# Patient Record
Sex: MALE | Race: ASIAN | NOT HISPANIC OR LATINO | URBAN - METROPOLITAN AREA
[De-identification: names, ages, dates, MRNs, and addresses within clinical notes are randomized per-mention and may not be internally consistent; named-entity substitution may affect disease eponyms.]

---

## 2017-12-15 ENCOUNTER — INPATIENT (INPATIENT)
Facility: HOSPITAL | Age: 57
LOS: 22 days | Discharge: HOME | DRG: 64 | End: 2018-01-07
Attending: SPECIALIST | Admitting: NEUROLOGICAL SURGERY
Payer: MEDICAID

## 2017-12-15 VITALS
SYSTOLIC BLOOD PRESSURE: 142 MMHG | DIASTOLIC BLOOD PRESSURE: 82 MMHG | HEART RATE: 72 BPM | RESPIRATION RATE: 16 BRPM | OXYGEN SATURATION: 98 %

## 2017-12-15 DIAGNOSIS — I10 ESSENTIAL (PRIMARY) HYPERTENSION: ICD-10-CM

## 2017-12-15 LAB
ALBUMIN SERPL ELPH-MCNC: 4.2 G/DL — SIGNIFICANT CHANGE UP (ref 3.3–5)
ALP SERPL-CCNC: 53 U/L — SIGNIFICANT CHANGE UP (ref 40–120)
ALT FLD-CCNC: 39 U/L RC — SIGNIFICANT CHANGE UP (ref 10–45)
ANION GAP SERPL CALC-SCNC: 13 MMOL/L — SIGNIFICANT CHANGE UP (ref 5–17)
APTT BLD: 28.1 SEC — SIGNIFICANT CHANGE UP (ref 27.5–37.4)
AST SERPL-CCNC: 41 U/L — HIGH (ref 10–40)
BASOPHILS # BLD AUTO: 0 K/UL — SIGNIFICANT CHANGE UP (ref 0–0.2)
BASOPHILS NFR BLD AUTO: 0.1 % — SIGNIFICANT CHANGE UP (ref 0–2)
BILIRUB SERPL-MCNC: 0.5 MG/DL — SIGNIFICANT CHANGE UP (ref 0.2–1.2)
BLD GP AB SCN SERPL QL: NEGATIVE — SIGNIFICANT CHANGE UP
BUN SERPL-MCNC: 18 MG/DL — SIGNIFICANT CHANGE UP (ref 7–23)
CALCIUM SERPL-MCNC: 8.9 MG/DL — SIGNIFICANT CHANGE UP (ref 8.4–10.5)
CHLORIDE SERPL-SCNC: 101 MMOL/L — SIGNIFICANT CHANGE UP (ref 96–108)
CO2 SERPL-SCNC: 27 MMOL/L — SIGNIFICANT CHANGE UP (ref 22–31)
CREAT SERPL-MCNC: 0.74 MG/DL — SIGNIFICANT CHANGE UP (ref 0.5–1.3)
EOSINOPHIL # BLD AUTO: 0 K/UL — SIGNIFICANT CHANGE UP (ref 0–0.5)
EOSINOPHIL NFR BLD AUTO: 0 % — SIGNIFICANT CHANGE UP (ref 0–6)
GLUCOSE SERPL-MCNC: 153 MG/DL — HIGH (ref 70–99)
HCT VFR BLD CALC: 47.2 % — SIGNIFICANT CHANGE UP (ref 39–50)
HGB BLD-MCNC: 15.8 G/DL — SIGNIFICANT CHANGE UP (ref 13–17)
INR BLD: 1.08 RATIO — SIGNIFICANT CHANGE UP (ref 0.88–1.16)
LYMPHOCYTES # BLD AUTO: 1.3 K/UL — SIGNIFICANT CHANGE UP (ref 1–3.3)
LYMPHOCYTES # BLD AUTO: 7.9 % — LOW (ref 13–44)
MCHC RBC-ENTMCNC: 31.1 PG — SIGNIFICANT CHANGE UP (ref 27–34)
MCHC RBC-ENTMCNC: 33.4 GM/DL — SIGNIFICANT CHANGE UP (ref 32–36)
MCV RBC AUTO: 93.1 FL — SIGNIFICANT CHANGE UP (ref 80–100)
MONOCYTES # BLD AUTO: 0.6 K/UL — SIGNIFICANT CHANGE UP (ref 0–0.9)
MONOCYTES NFR BLD AUTO: 3.6 % — SIGNIFICANT CHANGE UP (ref 2–14)
NEUTROPHILS # BLD AUTO: 14.3 K/UL — HIGH (ref 1.8–7.4)
NEUTROPHILS NFR BLD AUTO: 88.3 % — HIGH (ref 43–77)
PLATELET # BLD AUTO: 292 K/UL — SIGNIFICANT CHANGE UP (ref 150–400)
POTASSIUM SERPL-MCNC: 4.9 MMOL/L — SIGNIFICANT CHANGE UP (ref 3.5–5.3)
POTASSIUM SERPL-SCNC: 4.9 MMOL/L — SIGNIFICANT CHANGE UP (ref 3.5–5.3)
PROT SERPL-MCNC: 7.8 G/DL — SIGNIFICANT CHANGE UP (ref 6–8.3)
PROTHROM AB SERPL-ACNC: 11.7 SEC — SIGNIFICANT CHANGE UP (ref 9.8–12.7)
RBC # BLD: 5.07 M/UL — SIGNIFICANT CHANGE UP (ref 4.2–5.8)
RBC # FLD: 13.4 % — SIGNIFICANT CHANGE UP (ref 10.3–14.5)
RH IG SCN BLD-IMP: POSITIVE — SIGNIFICANT CHANGE UP
SODIUM SERPL-SCNC: 141 MMOL/L — SIGNIFICANT CHANGE UP (ref 135–145)
WBC # BLD: 16.2 K/UL — HIGH (ref 3.8–10.5)
WBC # FLD AUTO: 16.2 K/UL — HIGH (ref 3.8–10.5)

## 2017-12-15 PROCEDURE — 99291 CRITICAL CARE FIRST HOUR: CPT

## 2017-12-15 PROCEDURE — 71010: CPT | Mod: 26

## 2017-12-15 PROCEDURE — 93010 ELECTROCARDIOGRAM REPORT: CPT

## 2017-12-15 PROCEDURE — 70496 CT ANGIOGRAPHY HEAD: CPT | Mod: 26

## 2017-12-15 PROCEDURE — 70498 CT ANGIOGRAPHY NECK: CPT | Mod: 26

## 2017-12-15 PROCEDURE — 99291 CRITICAL CARE FIRST HOUR: CPT | Mod: 25

## 2017-12-15 RX ORDER — ACETAMINOPHEN 500 MG
650 TABLET ORAL EVERY 6 HOURS
Qty: 0 | Refills: 0 | Status: DISCONTINUED | OUTPATIENT
Start: 2017-12-15 | End: 2018-01-07

## 2017-12-15 RX ORDER — SENNA PLUS 8.6 MG/1
2 TABLET ORAL AT BEDTIME
Qty: 0 | Refills: 0 | Status: DISCONTINUED | OUTPATIENT
Start: 2017-12-15 | End: 2018-01-07

## 2017-12-15 RX ORDER — SODIUM CHLORIDE 9 MG/ML
1000 INJECTION INTRAMUSCULAR; INTRAVENOUS; SUBCUTANEOUS
Qty: 0 | Refills: 0 | Status: DISCONTINUED | OUTPATIENT
Start: 2017-12-15 | End: 2017-12-16

## 2017-12-15 RX ORDER — DOCUSATE SODIUM 100 MG
100 CAPSULE ORAL DAILY
Qty: 0 | Refills: 0 | Status: DISCONTINUED | OUTPATIENT
Start: 2017-12-15 | End: 2018-01-07

## 2017-12-15 NOTE — H&P ADULT - NSHPPHYSICALEXAM_GEN_ALL_CORE
AOx3, FC, PERRL, EOMI, V1-3 intact, no facial, palate brynn symmetric, tongue midline, shrug 5/5  5/5 throughout, no drift  Mild BUE dystmetria on FTN  SILT  No clonus or babinski

## 2017-12-15 NOTE — ED PROVIDER NOTE - CRITICAL CARE PROVIDED
additional history taking/consult w/ pt's family directly relating to pts condition/interpretation of diagnostic studies/consultation with other physicians/conducted a detailed discussion of DNR status/direct patient care (not related to procedure)/documentation

## 2017-12-15 NOTE — ED ADULT NURSE REASSESSMENT NOTE - NS ED NURSE REASSESS COMMENT FT1
Pt admitted to NSCU, awaiting bed assignment. Neuro check and VS on yellow sheet. Comfort and safety maintained.

## 2017-12-15 NOTE — PROGRESS NOTE ADULT - ASSESSMENT
Summary:     NEURO:  q1h neuro checks; repeat CT in AM; hydro watch for possible EVD given 4th ventricular hemorrhage; CTA pending; stroke neuro consult in AM    CARDS:  -160    PULM:  sat > 92%    RENAL:  NS@75 while NPO    GASTRO:  NPO for now  ---> Stress ulcer prophylaxis:  PPI    HEME:  monitor H/H    ---> DVT prophylaxis: SCDs, hold anticoagulation, fresh bleed    ENDO:  euglycemia    ID:  afebrile    Code status:  Full code  Disposition:  ICU    This patient was at high risk of neurologic deterioration and/or death due to: hydrocephalus    Time spent:  45 minutes

## 2017-12-15 NOTE — H&P ADULT - HISTORY OF PRESENT ILLNESS
57 year old male pmhx of htn coming in with 1 day of dizziness found to have vermian hemorrhage. Patient states he takes no medication and has no pmhx but reportedly has hx of htn. Was transferred from [facility] for spontaneous IPH. Pt started to have dizzy and lightheaded when bedning over yesterday. Patient staes that dizziness did not go away this morning so he went ot the hospital. Denies generalized weakness, and nausea. No fall or recent trauma. No numbness, tingling, weakness. No antiplatelets or anticoagulants. Patient currently denies and headache, n/v, blurry vision, double vision, numbness or weakness.

## 2017-12-15 NOTE — ED PROVIDER NOTE - PHYSICAL EXAMINATION
*GEN:   elderly M who is in no acute distress, AAOx3, + slowed speech intermittently    ///    *EYES:   PERRL, 3 mm bilaterally, EOMI    ///    *HEENT:   airway patent, moist mucosal membranes    ///    *CV:   normal rate and regular rhythm, normal S1/S2    ///    *RESP:   clear to auscultation bilaterally, non-labored    ///    *ABD:   soft, non distended, non tender to palpation, no guarding    ///      *MSK:   no deformity of extremities    ///    *SKIN:   dry, intact, no rashes, no edema    ///    *NEURO:   CN III-XII grossly intact, no focal weakness or loss of sensation. 5/5 strength, no pronator drift. ~ El Corey MD

## 2017-12-15 NOTE — ED ADULT NURSE NOTE - OBJECTIVE STATEMENT
57 year old male presents to ED as a transfer from [facility] with SAH, spontaneous  while sitting at work and was complaining of fatigue.  Coworker drove him to the [place] ED where they found the sah.  PMH of uncontrolled htn. Patient is mandarin speaking, used  phone  Luana ID# [id number].  Patient is extremely fatigued but oriented. Dyphasia completed on flowsheet (fail)

## 2017-12-15 NOTE — ED PROVIDER NOTE - ATTENDING CONTRIBUTION TO CARE
Dr. Betancourt (Attending Physician)  Pt. woke up dizzy with difficulty walking.  Ataxic on exam. Found to have 3.6 cm IPH cerebellum at [place].  Neurosurg called and at bedside. Will repeat CT with CTA and reassess.

## 2017-12-15 NOTE — H&P ADULT - ASSESSMENT
57 year old male found to have spontaneously vermian hemorrhage  - Repeat CTH and CTA  - Admit to NSCU under Dr. Sanchez  - Neurochecks q1hr, hydrocepahlus watch  - MAP goal >80 for maximizing CPP 57 year old male found to have spontaneously vermian hemorrhage. ICH score of 2  - Repeat CTH and CTA  - Admit to NSCU under Dr. Sanchez  - Neurochecks q1hr, hydrocepahlus watch  - MAP goal >80 for maximizing CPP

## 2017-12-15 NOTE — PROGRESS NOTE ADULT - SUBJECTIVE AND OBJECTIVE BOX
HPI:  57 year old male pmhx of htn coming in with 1 day of dizziness found to have vermian hemorrhage. Patient states he takes no medication and has no pmhx but reportedly has hx of htn. Was transferred from [facility] for spontaneous IPH. Pt started to have dizzy and lightheaded when bedning over yesterday. Patient staes that dizziness did not go away this morning so he went ot the hospital. Denies generalized weakness, and nausea. No fall or recent trauma. No numbness, tingling, weakness. No antiplatelets or anticoagulants. Patient currently denies and headache, n/v, blurry vision, double vision, numbness or weakness. (15 Dec 2017 18:58)    On admission, the patient was:  GCS: 15  Hunt-Valdez:  modified Funez:  ICH score: IVH only  NIHSS:    VITALS:  T(C): , Max: 37.2 (12-15-17 @ 19:25)  HR:  (71 - 78)  BP:  (125/86 - 173/82)  ABP: --  RR:  (16 - 20)  SpO2:  (96% - 100%)  Wt(kg): --      LABS:  Na: 141 (12-15 @ 17:56)  K: 4.9 (12-15 @ 17:56)  Cl: 101 (12-15 @ 17:56)  CO2: 27 (12-15 @ 17:56)  BUN: 18 (12-15 @ 17:56)  Cr: 0.74 (12-15 @ 17:56)  Glu: 153(12-15 @ 17:56)    Hgb: 15.8 (12-15 @ 17:56)  Hct: 47.2 (12-15 @ 17:56)  WBC: 16.2 (12-15 @ 17:56)  Plt: 292 (12-15 @ 17:56)  PT: 11.7 (12-15 @ 17:56)  INR: 1.08 (12-15 @ 17:56)  aPTT: 28.1 (12-15 @ 17:56)    IMAGING:   Recent imaging studies were reviewed.    MEDICATIONS:    EXAMINATION:  General:  calm  HEENT:  MMM  Neuro:  awake, alert, oriented x 3 in mandarin, follows commands, moves all extremities, slight dysmetria and dysdiadochokinesia in the b/l UE  Cards:  RRR  Respiratory:  no respiratory distress  Adomen:  soft  Extremities:  no edema  Skin:  warm/dry

## 2017-12-16 LAB
ANION GAP SERPL CALC-SCNC: 11 MMOL/L — SIGNIFICANT CHANGE UP (ref 5–17)
BUN SERPL-MCNC: 19 MG/DL — SIGNIFICANT CHANGE UP (ref 7–23)
CALCIUM SERPL-MCNC: 8.6 MG/DL — SIGNIFICANT CHANGE UP (ref 8.4–10.5)
CHLORIDE SERPL-SCNC: 103 MMOL/L — SIGNIFICANT CHANGE UP (ref 96–108)
CHOLEST SERPL-MCNC: 167 MG/DL — SIGNIFICANT CHANGE UP (ref 10–199)
CK MB BLD-MCNC: 0.9 % — SIGNIFICANT CHANGE UP (ref 0–3.5)
CK MB BLD-MCNC: 0.9 % — SIGNIFICANT CHANGE UP (ref 0–3.5)
CK MB CFR SERPL CALC: 2.2 NG/ML — SIGNIFICANT CHANGE UP (ref 0–6.7)
CK MB CFR SERPL CALC: 4.5 NG/ML — SIGNIFICANT CHANGE UP (ref 0–6.7)
CK SERPL-CCNC: 241 U/L — HIGH (ref 30–200)
CK SERPL-CCNC: 528 U/L — HIGH (ref 30–200)
CO2 SERPL-SCNC: 25 MMOL/L — SIGNIFICANT CHANGE UP (ref 22–31)
CREAT SERPL-MCNC: 0.71 MG/DL — SIGNIFICANT CHANGE UP (ref 0.5–1.3)
GLUCOSE BLDC GLUCOMTR-MCNC: 169 MG/DL — HIGH (ref 70–99)
GLUCOSE BLDC GLUCOMTR-MCNC: 199 MG/DL — HIGH (ref 70–99)
GLUCOSE SERPL-MCNC: 168 MG/DL — HIGH (ref 70–99)
HCT VFR BLD CALC: 43.5 % — SIGNIFICANT CHANGE UP (ref 39–50)
HDLC SERPL-MCNC: 55 MG/DL — SIGNIFICANT CHANGE UP (ref 40–125)
HGB BLD-MCNC: 15 G/DL — SIGNIFICANT CHANGE UP (ref 13–17)
LIPID PNL WITH DIRECT LDL SERPL: 93 MG/DL — SIGNIFICANT CHANGE UP
MAGNESIUM SERPL-MCNC: 2.3 MG/DL — SIGNIFICANT CHANGE UP (ref 1.6–2.6)
MCHC RBC-ENTMCNC: 32 PG — SIGNIFICANT CHANGE UP (ref 27–34)
MCHC RBC-ENTMCNC: 34.6 GM/DL — SIGNIFICANT CHANGE UP (ref 32–36)
MCV RBC AUTO: 92.6 FL — SIGNIFICANT CHANGE UP (ref 80–100)
PHOSPHATE SERPL-MCNC: 2.7 MG/DL — SIGNIFICANT CHANGE UP (ref 2.5–4.5)
PLATELET # BLD AUTO: 261 K/UL — SIGNIFICANT CHANGE UP (ref 150–400)
POTASSIUM SERPL-MCNC: 3.9 MMOL/L — SIGNIFICANT CHANGE UP (ref 3.5–5.3)
POTASSIUM SERPL-SCNC: 3.9 MMOL/L — SIGNIFICANT CHANGE UP (ref 3.5–5.3)
RBC # BLD: 4.7 M/UL — SIGNIFICANT CHANGE UP (ref 4.2–5.8)
RBC # FLD: 13.1 % — SIGNIFICANT CHANGE UP (ref 10.3–14.5)
RH IG SCN BLD-IMP: POSITIVE — SIGNIFICANT CHANGE UP
SODIUM SERPL-SCNC: 139 MMOL/L — SIGNIFICANT CHANGE UP (ref 135–145)
T4 AB SER-ACNC: 3.7 UG/DL — LOW (ref 4.6–12)
TOTAL CHOLESTEROL/HDL RATIO MEASUREMENT: 3 RATIO — LOW (ref 3.4–9.6)
TRIGL SERPL-MCNC: 94 MG/DL — SIGNIFICANT CHANGE UP (ref 10–149)
TROPONIN T SERPL-MCNC: <0.01 NG/ML — SIGNIFICANT CHANGE UP (ref 0–0.06)
TROPONIN T SERPL-MCNC: <0.01 NG/ML — SIGNIFICANT CHANGE UP (ref 0–0.06)
TSH SERPL-MCNC: 0.53 UIU/ML — SIGNIFICANT CHANGE UP (ref 0.27–4.2)
WBC # BLD: 11 K/UL — HIGH (ref 3.8–10.5)
WBC # FLD AUTO: 11 K/UL — HIGH (ref 3.8–10.5)

## 2017-12-16 PROCEDURE — 99292 CRITICAL CARE ADDL 30 MIN: CPT

## 2017-12-16 PROCEDURE — 99291 CRITICAL CARE FIRST HOUR: CPT

## 2017-12-16 PROCEDURE — 70450 CT HEAD/BRAIN W/O DYE: CPT | Mod: 26

## 2017-12-16 PROCEDURE — 93010 ELECTROCARDIOGRAM REPORT: CPT

## 2017-12-16 RX ORDER — AMLODIPINE BESYLATE 2.5 MG/1
5 TABLET ORAL DAILY
Qty: 0 | Refills: 0 | Status: DISCONTINUED | OUTPATIENT
Start: 2017-12-16 | End: 2017-12-17

## 2017-12-16 RX ORDER — HYDRALAZINE HCL 50 MG
10 TABLET ORAL ONCE
Qty: 0 | Refills: 0 | Status: COMPLETED | OUTPATIENT
Start: 2017-12-16 | End: 2017-12-16

## 2017-12-16 RX ORDER — INFLUENZA VIRUS VACCINE 15; 15; 15; 15 UG/.5ML; UG/.5ML; UG/.5ML; UG/.5ML
0.5 SUSPENSION INTRAMUSCULAR ONCE
Qty: 0 | Refills: 0 | Status: DISCONTINUED | OUTPATIENT
Start: 2017-12-16 | End: 2018-01-07

## 2017-12-16 RX ORDER — INSULIN LISPRO 100/ML
VIAL (ML) SUBCUTANEOUS
Qty: 0 | Refills: 0 | Status: DISCONTINUED | OUTPATIENT
Start: 2017-12-16 | End: 2017-12-17

## 2017-12-16 RX ORDER — LABETALOL HCL 100 MG
10 TABLET ORAL ONCE
Qty: 0 | Refills: 0 | Status: COMPLETED | OUTPATIENT
Start: 2017-12-16 | End: 2017-12-16

## 2017-12-16 RX ORDER — LABETALOL HCL 100 MG
10 TABLET ORAL EVERY 4 HOURS
Qty: 0 | Refills: 0 | Status: COMPLETED | OUTPATIENT
Start: 2017-12-16 | End: 2017-12-16

## 2017-12-16 RX ADMIN — Medication 10 MILLIGRAM(S): at 14:05

## 2017-12-16 RX ADMIN — Medication 100 MILLIGRAM(S): at 12:29

## 2017-12-16 RX ADMIN — Medication 10 MILLIGRAM(S): at 17:00

## 2017-12-16 RX ADMIN — Medication 2: at 17:36

## 2017-12-16 RX ADMIN — Medication 10 MILLIGRAM(S): at 14:45

## 2017-12-16 RX ADMIN — AMLODIPINE BESYLATE 5 MILLIGRAM(S): 2.5 TABLET ORAL at 12:29

## 2017-12-16 RX ADMIN — SENNA PLUS 2 TABLET(S): 8.6 TABLET ORAL at 22:11

## 2017-12-16 RX ADMIN — Medication 2: at 12:38

## 2017-12-16 NOTE — PROGRESS NOTE ADULT - ASSESSMENT
NEURO:  q1h neuro checks; hydro watch for possible EVD given 4th ventricular hemorrhage; stroke neuro consult     CARDS:  -150, start norvasc 5mg/d    PULM:  sat > 92%    RENAL:  NS@75 while NPO    GASTRO:  NPO for now while on hydro watch  ---> Stress ulcer prophylaxis:      HEME:  monitor H/H    ---> DVT prophylaxis: SCDs, hold anticoagulation, fresh bleed    ENDO:  euglycemia. send a1c    ID:  afebrile    Code status:  Full code  Disposition:  ICU    This patient was at high risk of neurologic deterioration and/or death due to: hydrocephalus, brain compression

## 2017-12-16 NOTE — CONSULT NOTE ADULT - SUBJECTIVE AND OBJECTIVE BOX
Neurology Consult Note    HPI per EMR - "57 year old male pmhx of htn coming in with 1 day of dizziness found to have vermian hemorrhage. Patient states he takes no medication and has no pmhx but reportedly has hx of htn. Was transferred from [facility] for spontaneous IPH. Pt started to have dizzinezz and lightheaded when bending over on 10/14. Patient states that dizziness did not go away this morning so he went to the hospital. Denies generalized weakness, and nausea. No fall or recent trauma. No numbness, tingling, weakness. No antiplatelets or anticoagulants. Denies and headache, n/v, blurry vision, double vision, numbness or weakness."    Stroke consult for cerebellar vermian hemorrhage.    MEDICATIONS  (STANDING):  amLODIPine   Tablet 5 milliGRAM(s) Oral daily  docusate sodium 100 milliGRAM(s) Oral daily  influenza   Vaccine 0.5 milliLiter(s) IntraMuscular once  insulin lispro (HumaLOG) corrective regimen sliding scale   SubCutaneous three times a day before meals  senna 2 Tablet(s) Oral at bedtime  sodium chloride 0.9%. 1000 milliLiter(s) (75 mL/Hr) IV Continuous <Continuous>    MEDICATIONS  (PRN):  acetaminophen   Tablet 650 milliGRAM(s) Oral every 6 hours PRN For Temp greater than 38 C (100.4 F)  acetaminophen   Tablet. 650 milliGRAM(s) Oral every 6 hours PRN Mild Pain (1 - 3)    ICU Vital Signs Last 24 Hrs  T(C): 36.7 (16 Dec 2017 07:00), Max: 37.2 (15 Dec 2017 19:25)  T(F): 98.1 (16 Dec 2017 07:00), Max: 98.9 (15 Dec 2017 19:25)  HR: 65 (16 Dec 2017 08:00) (64 - 83)  BP: 123/72 (16 Dec 2017 08:00) (88/46 - 173/82)  BP(mean): 85 (16 Dec 2017 08:00) (56 - 105)  ABP: --  ABP(mean): --  RR: 19 (16 Dec 2017 08:00) (16 - 23)  SpO2: 94% (16 Dec 2017 08:00) (94% - 100%)    Neurological Exam:    Labs                        15.8   16.2  )-----------( 292      ( 15 Dec 2017 17:56 )             47.2   12-15    141  |  101  |  18  ----------------------------<  153<H>  4.9   |  27  |  0.74    Ca    8.9      15 Dec 2017 17:56    TPro  7.8  /  Alb  4.2  /  TBili  0.5  /  DBili  x   /  AST  41<H>  /  ALT  39  /  AlkPhos  53  12-15    Imaging  NONCONTRAST CT BRAIN: Acute intra-axial hemorrhage in the cerebellar   vermis causing mass effect on the fourth ventricle. Mild prominence of   the third and lateral ventricles. Heterogeneous calcified mass in the   extracalvarial soft tissues of the right occipital bone.    CTA BRAIN: Patent intracranial circulation. No flow-limiting stenosis or   occlusion.     CTA NECK: Patent cervical vasculature. No flow limiting stenosis or   occlusion. Neurology Consult Note    HPI per EMR - "57 year old male pmhx of htn coming in with 1 day of dizziness found to have vermian hemorrhage. Patient states he takes no medication and has no pmhx but reportedly has hx of htn. Was transferred from [facility] for spontaneous IPH. Pt started to have dizziness and lightheaded when bending over on 10/14. Patient states that dizziness did not go away this morning so he went to the hospital. Denies generalized weakness, and nausea. No fall or recent trauma. No numbness, tingling, weakness. No antiplatelets or anticoagulants. Denies and headache, n/v, blurry vision, double vision, numbness or weakness."    Stroke consult for cerebellar vermian hemorrhage.    MEDICATIONS  (STANDING):  amLODIPine   Tablet 5 milliGRAM(s) Oral daily  docusate sodium 100 milliGRAM(s) Oral daily  influenza   Vaccine 0.5 milliLiter(s) IntraMuscular once  insulin lispro (HumaLOG) corrective regimen sliding scale   SubCutaneous three times a day before meals  senna 2 Tablet(s) Oral at bedtime  sodium chloride 0.9%. 1000 milliLiter(s) (75 mL/Hr) IV Continuous <Continuous>    MEDICATIONS  (PRN):  acetaminophen   Tablet 650 milliGRAM(s) Oral every 6 hours PRN For Temp greater than 38 C (100.4 F)  acetaminophen   Tablet. 650 milliGRAM(s) Oral every 6 hours PRN Mild Pain (1 - 3)    ICU Vital Signs Last 24 Hrs  T(C): 36.7 (16 Dec 2017 07:00), Max: 37.2 (15 Dec 2017 19:25)  T(F): 98.1 (16 Dec 2017 07:00), Max: 98.9 (15 Dec 2017 19:25)  HR: 65 (16 Dec 2017 08:00) (64 - 83)  BP: 123/72 (16 Dec 2017 08:00) (88/46 - 173/82)  BP(mean): 85 (16 Dec 2017 08:00) (56 - 105)  ABP: --  ABP(mean): --  RR: 19 (16 Dec 2017 08:00) (16 - 23)  SpO2: 94% (16 Dec 2017 08:00) (94% - 100%)    Neurological Exam:  MS - Sleeping in hospital bed, easily arousable and oriented x 3, naming in tact, three word recall in tact  Cns - EOMI, no facial asymmetry, no dysarthria, sensation in tact B/L in v1-v3 distribution  Motor - 5/5 throughout, with no drift  Sensory - Light touch in tact b/l  Coordination - Past pointing b/l on finger to nose, no truncal ataxia appreciated       Labs                        15.8   16.2  )-----------( 292      ( 15 Dec 2017 17:56 )             47.2   12-15    141  |  101  |  18  ----------------------------<  153<H>  4.9   |  27  |  0.74    Ca    8.9      15 Dec 2017 17:56    TPro  7.8  /  Alb  4.2  /  TBili  0.5  /  DBili  x   /  AST  41<H>  /  ALT  39  /  AlkPhos  53  12-15    Imaging  NONCONTRAST CT BRAIN: Acute intra-axial hemorrhage in the cerebellar   vermis causing mass effect on the fourth ventricle. Mild prominence of   the third and lateral ventricles. Heterogeneous calcified mass in the   extracalvarial soft tissues of the right occipital bone.    CTA BRAIN: Patent intracranial circulation. No flow-limiting stenosis or   occlusion.     CTA NECK: Patent cervical vasculature. No flow limiting stenosis or   occlusion.

## 2017-12-16 NOTE — PROGRESS NOTE ADULT - SUBJECTIVE AND OBJECTIVE BOX
HPI:  57 year old male pmhx of htn coming in with 1 day of dizziness found to have vermian hemorrhage. Patient states he takes no medication and has no pmhx but reportedly has hx of htn. Was transferred from [facility] for spontaneous IPH. Pt started to have dizzy and lightheaded when bedning over yesterday. Patient staes that dizziness did not go away this morning so he went ot the hospital. Denies generalized weakness, and nausea. No fall or recent trauma. No numbness, tingling, weakness. No antiplatelets or anticoagulants. Patient currently denies and headache, n/v, blurry vision, double vision, numbness or weakness.    OVERNIGHT EVENTS:   None significant    VITALS:  T(C): , Max: 37.2 (12-15-17 @ 19:25)  HR:  (64 - 83)  BP:  (88/46 - 173/82)  ABP: --  RR:  (16 - 23)  SpO2:  (94% - 100%)  Wt(kg): --      LABS:  Na: 141 (12-15 @ 17:56)  K: 4.9 (12-15 @ 17:56)  Cl: 101 (12-15 @ 17:56)  CO2: 27 (12-15 @ 17:56)  BUN: 18 (12-15 @ 17:56)  Cr: 0.74 (12-15 @ 17:56)  Glu:     Hgb: 15.8 (12-15 @ 17:56)  Hct: 47.2 (12-15 @ 17:56)  WBC: 16.2 (12-15 @ 17:56)  Plt: 292 (12-15 @ 17:56)    PT: 11.7 (12-15 @ 17:56)  INR: 1.08 (12-15 @ 17:56)  aPTT: 28.1 (12-15 @ 17:56)    IMAGING:   Recent imaging studies were reviewed.    MEDICATIONS:  acetaminophen   Tablet 650 milliGRAM(s) Oral every 6 hours PRN  acetaminophen   Tablet. 650 milliGRAM(s) Oral every 6 hours PRN  docusate sodium 100 milliGRAM(s) Oral daily  influenza   Vaccine 0.5 milliLiter(s) IntraMuscular once  senna 2 Tablet(s) Oral at bedtime  sodium chloride 0.9%. 1000 milliLiter(s) IV Continuous <Continuous>    EXAMINATION:  General:  calm  Neuro:  awake, alert, oriented x 3 in mandarin, follows commands, moves all extremities, slight dysmetria and dysdiadochokinesia in the b/l UE L>R)  Cards:  RRR  Respiratory:  CTAB  Adomen:  soft  Extremities:  no edema  Skin:  warm/dry

## 2017-12-16 NOTE — PROGRESS NOTE ADULT - ASSESSMENT
57M with cerebellar vermian hemorrhage    -CT in AM  -Neurochecks q1h  -Sodium goal 145  -SBP < 160  -Pain control

## 2017-12-16 NOTE — PROGRESS NOTE ADULT - SUBJECTIVE AND OBJECTIVE BOX
Patient seen and examined at bedside.    T(C): 36.7 (12-16-17 @ 03:00), Max: 37.2 (12-15-17 @ 19:25)  HR: 64 (12-16-17 @ 07:00) (64 - 83)  BP: 162/79 (12-16-17 @ 07:00) (88/46 - 173/82)  RR: 17 (12-16-17 @ 07:00) (16 - 23)  SpO2: 98% (12-16-17 @ 07:00) (94% - 100%)  Wt(kg): --    Exam:    AOx3, Following Commands    CN: PERRL, EOMI, no facial droop    Motor: 5/5 throughout, no drift    Sensation intact to light touch    Reflexes: no clonus

## 2017-12-16 NOTE — CONSULT NOTE ADULT - ASSESSMENT
Pt is a 57 year old male pmhx of HTN who was transferred from an OSH on 12/15 for management of a cerebellar vermian hemorrhage. His initial presented symptoms was dizziness, no other focal deficits noted. Ct head shows acute intra-axial hemorrhage in the cerebellar vermis causing mass effect on the fourth ventricle. Mild prominence of the third and lateral ventricles. Heterogeneous calcified mass in the extracalvarial soft tissues of the right occipital bone. CTA of the head and neck are normal. Repeat CT head on 12/16 is stable and pt remains clinically stable. ICH score - 1    Reccs:  When medically stable:  MRI brain without Gerson  MRI head without Gerson  MRI neck with Gerson  Neuro-assessments Q1H for hydrocephalus, Neurosurgery on board.  Blood pressure control - Systolic BP less than 160 per ICH guidelines Pt is a 57 year old male pmhx of HTN who was transferred from an OSH on 12/15 for management of a cerebellar vermian hemorrhage. His initial presented symptoms was dizziness, no other focal deficits noted. PE is significant for past pointing b/l FTN, no other deficits. Ct head shows acute intra-axial hemorrhage in the cerebellar vermis causing mass effect on the fourth ventricle. Mild prominence of the third and lateral ventricles. Heterogeneous calcified mass in the extracalvarial soft tissues of the right occipital bone. CTA of the head and neck are normal. Repeat CT head on 12/16 is stable and pt remains clinically stable. ICH score - 1    Cerebellar vermian hemorrhage likely 2/2 hypertension.   Reccs:  Trend wbc count   LDL 93   When medically stable:  MRI brain without Gerson  MRI head without Gerson  MRI neck with Gerson  Neuro-assessments Q1H for hydrocephalus, Neurosurgery on board.  Blood pressure control - Systolic BP less than 160 per ICH guidelines  If medically stable and no overnight events, pt for possible downgrade on 12/17, to be discussed with Attending in the AM

## 2017-12-16 NOTE — PROGRESS NOTE ADULT - SUBJECTIVE AND OBJECTIVE BOX
NSCU ATTENDING -- ADDITIONAL PROGRESS NOTE    Nighttime rounds were performed -- please refer to earlier Progress Note for HPI details.    T(C): 37 (12-16-17 @ 19:00), Max: 37.1 (12-15-17 @ 22:04)  HR: 74 (12-16-17 @ 20:00) (63 - 83)  BP: 136/64 (12-16-17 @ 20:00) (88/46 - 173/78)  RR: 20 (12-16-17 @ 20:00) (15 - 24)  SpO2: 94% (12-16-17 @ 20:00) (94% - 98%)  Wt(kg): --    Relevant labwork and imaging reviewed.    Patient remains critically ill.    Remains on hydrocephalus watch given extensive 4th ventricular blood, though maintaining his neurologic status.  Possible transfer to stroke service tomorrow if remains without EVD requirement.  Repeat CT in AM.    Additional 30 minutes of critical care time.

## 2017-12-17 LAB
GLUCOSE BLDC GLUCOMTR-MCNC: 136 MG/DL — HIGH (ref 70–99)
GLUCOSE BLDC GLUCOMTR-MCNC: 143 MG/DL — HIGH (ref 70–99)
GLUCOSE BLDC GLUCOMTR-MCNC: 211 MG/DL — HIGH (ref 70–99)
GLUCOSE BLDC GLUCOMTR-MCNC: 239 MG/DL — HIGH (ref 70–99)
HBA1C BLD-MCNC: 6.7 % — HIGH (ref 4–5.6)

## 2017-12-17 PROCEDURE — 99292 CRITICAL CARE ADDL 30 MIN: CPT

## 2017-12-17 PROCEDURE — 99233 SBSQ HOSP IP/OBS HIGH 50: CPT

## 2017-12-17 PROCEDURE — 70450 CT HEAD/BRAIN W/O DYE: CPT | Mod: 26

## 2017-12-17 PROCEDURE — 93306 TTE W/DOPPLER COMPLETE: CPT | Mod: 26

## 2017-12-17 PROCEDURE — 99291 CRITICAL CARE FIRST HOUR: CPT

## 2017-12-17 RX ORDER — LABETALOL HCL 100 MG
100 TABLET ORAL EVERY 8 HOURS
Qty: 0 | Refills: 0 | Status: DISCONTINUED | OUTPATIENT
Start: 2017-12-17 | End: 2017-12-17

## 2017-12-17 RX ORDER — DEXTROSE 50 % IN WATER 50 %
25 SYRINGE (ML) INTRAVENOUS ONCE
Qty: 0 | Refills: 0 | Status: DISCONTINUED | OUTPATIENT
Start: 2017-12-17 | End: 2018-01-07

## 2017-12-17 RX ORDER — DEXTROSE 50 % IN WATER 50 %
1 SYRINGE (ML) INTRAVENOUS ONCE
Qty: 0 | Refills: 0 | Status: DISCONTINUED | OUTPATIENT
Start: 2017-12-17 | End: 2018-01-07

## 2017-12-17 RX ORDER — ENOXAPARIN SODIUM 100 MG/ML
40 INJECTION SUBCUTANEOUS
Qty: 0 | Refills: 0 | Status: DISCONTINUED | OUTPATIENT
Start: 2017-12-18 | End: 2017-12-18

## 2017-12-17 RX ORDER — DEXTROSE 50 % IN WATER 50 %
12.5 SYRINGE (ML) INTRAVENOUS ONCE
Qty: 0 | Refills: 0 | Status: DISCONTINUED | OUTPATIENT
Start: 2017-12-17 | End: 2018-01-07

## 2017-12-17 RX ORDER — AMLODIPINE BESYLATE 2.5 MG/1
10 TABLET ORAL DAILY
Qty: 0 | Refills: 0 | Status: DISCONTINUED | OUTPATIENT
Start: 2017-12-17 | End: 2018-01-07

## 2017-12-17 RX ORDER — HUMAN INSULIN 100 [IU]/ML
2 INJECTION, SUSPENSION SUBCUTANEOUS EVERY 6 HOURS
Qty: 0 | Refills: 0 | Status: DISCONTINUED | OUTPATIENT
Start: 2017-12-17 | End: 2017-12-18

## 2017-12-17 RX ORDER — SODIUM CHLORIDE 9 MG/ML
1000 INJECTION, SOLUTION INTRAVENOUS
Qty: 0 | Refills: 0 | Status: DISCONTINUED | OUTPATIENT
Start: 2017-12-17 | End: 2017-12-25

## 2017-12-17 RX ORDER — LABETALOL HCL 100 MG
10 TABLET ORAL ONCE
Qty: 0 | Refills: 0 | Status: COMPLETED | OUTPATIENT
Start: 2017-12-17 | End: 2017-12-17

## 2017-12-17 RX ORDER — GLUCAGON INJECTION, SOLUTION 0.5 MG/.1ML
1 INJECTION, SOLUTION SUBCUTANEOUS ONCE
Qty: 0 | Refills: 0 | Status: DISCONTINUED | OUTPATIENT
Start: 2017-12-17 | End: 2018-01-07

## 2017-12-17 RX ORDER — LABETALOL HCL 100 MG
100 TABLET ORAL EVERY 8 HOURS
Qty: 0 | Refills: 0 | Status: DISCONTINUED | OUTPATIENT
Start: 2017-12-17 | End: 2017-12-20

## 2017-12-17 RX ORDER — INSULIN LISPRO 100/ML
VIAL (ML) SUBCUTANEOUS
Qty: 0 | Refills: 0 | Status: DISCONTINUED | OUTPATIENT
Start: 2017-12-17 | End: 2018-01-07

## 2017-12-17 RX ORDER — POTASSIUM CHLORIDE 20 MEQ
20 PACKET (EA) ORAL ONCE
Qty: 0 | Refills: 0 | Status: COMPLETED | OUTPATIENT
Start: 2017-12-17 | End: 2017-12-17

## 2017-12-17 RX ORDER — LABETALOL HCL 100 MG
10 TABLET ORAL EVERY 6 HOURS
Qty: 0 | Refills: 0 | Status: DISCONTINUED | OUTPATIENT
Start: 2017-12-17 | End: 2017-12-20

## 2017-12-17 RX ADMIN — Medication 4: at 12:59

## 2017-12-17 RX ADMIN — Medication 100 MILLIGRAM(S): at 12:59

## 2017-12-17 RX ADMIN — Medication 650 MILLIGRAM(S): at 18:28

## 2017-12-17 RX ADMIN — AMLODIPINE BESYLATE 5 MILLIGRAM(S): 2.5 TABLET ORAL at 06:35

## 2017-12-17 RX ADMIN — HUMAN INSULIN 2 UNIT(S): 100 INJECTION, SUSPENSION SUBCUTANEOUS at 17:58

## 2017-12-17 RX ADMIN — SENNA PLUS 2 TABLET(S): 8.6 TABLET ORAL at 22:09

## 2017-12-17 RX ADMIN — AMLODIPINE BESYLATE 10 MILLIGRAM(S): 2.5 TABLET ORAL at 13:00

## 2017-12-17 RX ADMIN — Medication 100 MILLIGRAM(S): at 22:12

## 2017-12-17 RX ADMIN — Medication 20 MILLIEQUIVALENT(S): at 05:00

## 2017-12-17 RX ADMIN — Medication 10 MILLIGRAM(S): at 09:00

## 2017-12-17 RX ADMIN — Medication 100 MILLIGRAM(S): at 14:50

## 2017-12-17 RX ADMIN — Medication 650 MILLIGRAM(S): at 19:30

## 2017-12-17 RX ADMIN — Medication 4: at 17:39

## 2017-12-17 NOTE — PROGRESS NOTE ADULT - ASSESSMENT
57-year old Mandarin speaking man w/ vermian hemorrhage. history of hypertension  -continue monitoring for development of hydrocephalus given blood in 4th ventricle   -will need MRI @ some point   -stroke neurology evaluation

## 2017-12-17 NOTE — PHYSICAL THERAPY INITIAL EVALUATION ADULT - ACTIVE RANGE OF MOTION EXAMINATION, REHAB EVAL
Left UE Active ROM was WFL (within functional limits)/Right UE Active ROM was WFL (within functional limits)/Left LE Active ROM was WFL (within functional limits)/Right LE Active ROM was WFL (within functional limits)

## 2017-12-17 NOTE — PROGRESS NOTE ADULT - SUBJECTIVE AND OBJECTIVE BOX
SUBJECTIVE:   no complaints     OVERNIGHT EVENTS:   [facility] 17 AM stable     Vital Signs Last 24 Hrs  T(C): 37 (16 Dec 2017 19:00), Max: 37 (16 Dec 2017 11:00)  T(F): 98.6 (16 Dec 2017 19:00), Max: 98.6 (16 Dec 2017 11:00)  HR: 63 (17 Dec 2017 02:00) (63 - 82)  BP: 135/69 (17 Dec 2017 02:00) (88/46 - 173/78)  BP(mean): 85 (17 Dec 2017 02:00) (56 - 100)  RR: 20 (17 Dec 2017 02:00) (15 - 24)  SpO2: 97% (17 Dec 2017 02:00) (93% - 98%)    PHYSICAL EXAM:    General: No Acute Distress     Neurological: Awake, alert oriented to person, place and time w/ translation  Following Commands   PERRL, EOMI, Face Symmetrical, Speech Fluent  Moving all extremities, Muscle Strength normal in all four extremities, No Drift, + dysmetria   Sensation to Light Touch Intact    LABS:                        15.0   11.0  )-----------( 261      ( 16 Dec 2017 22:47 )             43.5        139  |  103  |  19  ----------------------------<  168<H>  3.9   |  25  |  0.71    Ca    8.6      16 Dec 2017 22:47  Phos  2.7       Mg     2.3         TPro  7.8  /  Alb  4.2  /  TBili  0.5  /  DBili  x   /  AST  41<H>  /  ALT  39  /  AlkPhos  53  12-15  PT/INR - ( 15 Dec 2017 17:56 )   PT: 11.7 sec;   INR: 1.08 ratio         PTT - ( 15 Dec 2017 17:56 )  PTT:28.1 sec      12-15 @ 07:01  -   @ 07:00  --------------------------------------------------------  IN: 637.5 mL / OUT: 200 mL / NET: 437.5 mL     @ 07: @ 03:58  --------------------------------------------------------  IN: 1035 mL / OUT: 1440 mL / NET: -405 mL      DRAINS:     MEDICATIONS:  Antibiotics:    Neuro:  acetaminophen   Tablet 650 milliGRAM(s) Oral every 6 hours PRN For Temp greater than 38 C (100.4 F)  acetaminophen   Tablet. 650 milliGRAM(s) Oral every 6 hours PRN Mild Pain (1 - 3)    Cardiac:  amLODIPine   Tablet 5 milliGRAM(s) Oral daily    Pulm:    GI/:  docusate sodium 100 milliGRAM(s) Oral daily  senna 2 Tablet(s) Oral at bedtime    Other:   influenza   Vaccine 0.5 milliLiter(s) IntraMuscular once  insulin lispro (HumaLOG) corrective regimen sliding scale   SubCutaneous three times a day before meals  potassium chloride    Tablet ER 20 milliEquivalent(s) Oral once    DIET: [] Regular [x] CCD [] Renal [] Puree [] Dysphagia [] Tube Feeds:     IMAGIN17 CTH stable hemorrhage

## 2017-12-17 NOTE — PHYSICAL THERAPY INITIAL EVALUATION ADULT - PRECAUTIONS/LIMITATIONS, REHAB EVAL
CTH: Stable acute cerebellar vermian hemorrhage with surrounding edema and mass effect. Stable ventricular size and configuration. NONCONTRAST CT BRAIN: Acute intra-axial hemorrhage in the cerebellar vermis causing mass effect on the fourth ventricle. Mild prominence of the third and lateral ventricles. Heterogeneous calcified mass in the extracalvarial soft tissues of the right occipital bone. CTA BRAIN: Patent intracranial circulation. No flow-limiting stenosis or occlusion.  CTA NECK: Patent cervical vasculature. No flow limiting stenosis or occlusion. fall precautions/CTH: Stable acute cerebellar vermian hemorrhage with surrounding edema and mass effect. Stable ventricular size and configuration. NONCONTRAST CT BRAIN: Acute intra-axial hemorrhage in the cerebellar vermis causing mass effect on the fourth ventricle. Mild prominence of the third and lateral ventricles. Heterogeneous calcified mass in the extracalvarial soft tissues of the right occipital bone. CTA BRAIN: Patent intracranial circulation. No flow-limiting stenosis or occlusion.  CTA NECK: Patent cervical vasculature. No flow limiting stenosis or occlusion.

## 2017-12-17 NOTE — PHYSICAL THERAPY INITIAL EVALUATION ADULT - PERTINENT HX OF CURRENT PROBLEM, REHAB EVAL
Pt is a 57 year old male admitted to [facility] on 12/15/17 c/o dizziness x1 day, +vermian hemorrhage. pt states he takes no meds and has no pmhx but hx of HTN. Tx to [facility] from [facility] for spontaneous IPH. +dizziness and lightheaded when bending over. Denies generalized weakness, and nausea. No fall or recent trauma. No numbness, tingling, weakness. No antiplatelets or anticoagulants. Patient currently denies and headache, n/v, blurry vision, double vision, numbness or weakness.

## 2017-12-17 NOTE — PROGRESS NOTE ADULT - SUBJECTIVE AND OBJECTIVE BOX
NSCU ATTENDING -- ADDITIONAL PROGRESS NOTE    Nighttime rounds were performed -- please refer to earlier Progress Note for HPI details.    T(C): 36.9 (12-17-17 @ 19:00), Max: 36.9 (12-17-17 @ 19:00)  HR: 75 (12-17-17 @ 21:00) (63 - 82)  BP: 164/87 (12-17-17 @ 21:00) (135/69 - 185/85)  RR: 20 (12-17-17 @ 21:00) (18 - 25)  SpO2: 97% (12-17-17 @ 21:00) (93% - 98%)  Wt(kg): --    Relevant labwork and imaging reviewed.    Patient remains critically ill.    Remains on hydrocephalus watch with significant blood in the 4th ventricle.  Has not required external drainage as of yet.  Seen by stroke service today, possible transfer to the stroke service tomorrow.  Will schedule for lovenox starting tomorrow as well, q2 neuro and vital checks.    Additional 30 minutes of critical care time.

## 2017-12-17 NOTE — PHYSICAL THERAPY INITIAL EVALUATION ADULT - ADDITIONAL COMMENTS
Pt lives alone in apt on first floor, +6 steps to enter, PTA ind amb and ADLs, doesn't drive, works in [employer].

## 2017-12-17 NOTE — PROGRESS NOTE ADULT - SUBJECTIVE AND OBJECTIVE BOX
HPI:  57 year old male pmhx of htn coming in with 1 day of dizziness found to have vermian hemorrhage. Patient states he takes no medication and has no pmhx but reportedly has hx of htn. Was transferred from [facility] for spontaneous IPH. Pt started to have dizzy and lightheaded when bedning over yesterday. Patient staes that dizziness did not go away this morning so he went ot the hospital. Denies generalized weakness, and nausea. No fall or recent trauma. No numbness, tingling, weakness. No antiplatelets or anticoagulants. Patient currently denies and headache, n/v, blurry vision, double vision, numbness or weakness.    OVERNIGHT EVENTS:   None significant    Overnight Events:     ROS: negative [] unable to obtain as patient is comatose/intubated/aphasic []   VITALS:   T(C): 36.7 (12-17-17 @ 03:00), Max: 37 (12-16-17 @ 11:00)  HR: 64 (12-17-17 @ 07:00) (63 - 82)  BP: 140/83 (12-17-17 @ 07:00) (120/76 - 173/78)  RR: 19 (12-17-17 @ 07:00) (15 - 24)  SpO2: 96% (12-17-17 @ 07:00) (93% - 98%)    12-16-17 @ 07:01  -  12-17-17 @ 07:00  --------------------------------------------------------  IN: 1275 mL / OUT: 1740 mL / NET: -465 mL      LABS:                          15.0   11.0  )-----------( 261      ( 16 Dec 2017 22:47 )             43.5     12-16    139  |  103  |  19  ----------------------------<  168<H>  3.9   |  25  |  0.71    Ca    8.6      16 Dec 2017 22:47  Phos  2.7     12-16  Mg     2.3     12-16    TPro  7.8  /  Alb  4.2  /  TBili  0.5  /  DBili  x   /  AST  41<H>  /  ALT  39  /  AlkPhos  53  12-15    PT/INR - ( 15 Dec 2017 17:56 )   PT: 11.7 sec;   INR: 1.08 ratio         PTT - ( 15 Dec 2017 17:56 )  PTT:28.1 sec  MEDS:  MEDICATIONS  (STANDING):  amLODIPine   Tablet 5 milliGRAM(s) Oral daily  docusate sodium 100 milliGRAM(s) Oral daily  influenza   Vaccine 0.5 milliLiter(s) IntraMuscular once  insulin lispro (HumaLOG) corrective regimen sliding scale   SubCutaneous three times a day before meals  senna 2 Tablet(s) Oral at bedtime      [All pertinent recent Imaging/Reports reviewed]    DEVICES: [] Restraints [] TOM/HMV []LD [] ET tube [] Trach [] A-line [] Bray [] NGT [] Rectal Tube       EXAMINATION:  General:  calm  Neuro:  awake, alert, oriented x 3 in mandarin, follows commands, moves all extremities, slight dysmetria and dysdiadochokinesia in the b/l UE L>R)  Cards:  RRR  Respiratory:  CTAB  Adomen:  soft  Extremities:  no edema  Skin:  warm/dry

## 2017-12-17 NOTE — PROGRESS NOTE ADULT - ASSESSMENT
NEURO:  q1h neuro checks; hydro watch for possible EVD given 4th ventricular hemorrhage; CTH AM if stable possible Tx stroke unit    CARDS:  -150, start norvasc 5mg/d    PULM:  sat > 92%    RENAL:  NS@75 while NPO    GASTRO:  NPO for now while on hydro watch  ---> Stress ulcer prophylaxis:      HEME:  monitor H/H    ---> DVT prophylaxis: SCDs, hold anticoagulation, fresh bleed    ENDO:  euglycemia. send a1c    ID:  afebrile    Code status:  Full code  Disposition:  ICU    This patient was at high risk of neurologic deterioration and/or death due to: hydrocephalus, brain compression NEURO:  q2h neuro checks; hydro watch for possible EVD given 4th ventricular hemorrhage; CTH AM stable  possible Tx stroke unit    CARDS:  -150, start norvasc 10 mg/d  add Labetalol 100 q100h    PULM:  sat > 92%    RENAL:  NS@75 while NPO    GASTRO:  tolerates PO  ---> Stress ulcer prophylaxis:      HEME:  monitor H/H    ---> DVT prophylaxis: SCDs, hold anticoagulation, possible EVD/shunt    ENDO:  euglycemia. send a1c    ID:  afebrile    Code status:  Full code  Disposition:  ICU    This patient was at high risk of neurologic deterioration and/or death due to: hydrocephalus, brain compression

## 2017-12-17 NOTE — PHYSICAL THERAPY INITIAL EVALUATION ADULT - GAIT DEVIATIONS NOTED, PT EVAL
decreased weight-shifting ability/leaning forward/ reaching for bed/chair/decreased kade/decreased step length

## 2017-12-17 NOTE — PHYSICAL THERAPY INITIAL EVALUATION ADULT - GENERAL OBSERVATIONS, REHAB EVAL
Pt received Pt received sitting ni chair, friend/"uncle" at bedside, +IVL, +tele, +BP cuff, +pulse ox, requesting to use bathroom for BM, Mandarin speaking, able to communicate basic English

## 2017-12-18 LAB
ANION GAP SERPL CALC-SCNC: 10 MMOL/L — SIGNIFICANT CHANGE UP (ref 5–17)
ANION GAP SERPL CALC-SCNC: 12 MMOL/L — SIGNIFICANT CHANGE UP (ref 5–17)
APTT BLD: 29 SEC — SIGNIFICANT CHANGE UP (ref 27.5–37.4)
BLD GP AB SCN SERPL QL: NEGATIVE — SIGNIFICANT CHANGE UP
BUN SERPL-MCNC: 21 MG/DL — SIGNIFICANT CHANGE UP (ref 7–23)
BUN SERPL-MCNC: 24 MG/DL — HIGH (ref 7–23)
CALCIUM SERPL-MCNC: 8.4 MG/DL — SIGNIFICANT CHANGE UP (ref 8.4–10.5)
CALCIUM SERPL-MCNC: 8.5 MG/DL — SIGNIFICANT CHANGE UP (ref 8.4–10.5)
CHLORIDE SERPL-SCNC: 100 MMOL/L — SIGNIFICANT CHANGE UP (ref 96–108)
CHLORIDE SERPL-SCNC: 102 MMOL/L — SIGNIFICANT CHANGE UP (ref 96–108)
CO2 SERPL-SCNC: 24 MMOL/L — SIGNIFICANT CHANGE UP (ref 22–31)
CO2 SERPL-SCNC: 25 MMOL/L — SIGNIFICANT CHANGE UP (ref 22–31)
CREAT SERPL-MCNC: 0.69 MG/DL — SIGNIFICANT CHANGE UP (ref 0.5–1.3)
CREAT SERPL-MCNC: 0.76 MG/DL — SIGNIFICANT CHANGE UP (ref 0.5–1.3)
GLUCOSE BLDC GLUCOMTR-MCNC: 111 MG/DL — HIGH (ref 70–99)
GLUCOSE BLDC GLUCOMTR-MCNC: 121 MG/DL — HIGH (ref 70–99)
GLUCOSE BLDC GLUCOMTR-MCNC: 137 MG/DL — HIGH (ref 70–99)
GLUCOSE BLDC GLUCOMTR-MCNC: 150 MG/DL — HIGH (ref 70–99)
GLUCOSE BLDC GLUCOMTR-MCNC: 188 MG/DL — HIGH (ref 70–99)
GLUCOSE BLDC GLUCOMTR-MCNC: 195 MG/DL — HIGH (ref 70–99)
GLUCOSE SERPL-MCNC: 136 MG/DL — HIGH (ref 70–99)
GLUCOSE SERPL-MCNC: 202 MG/DL — HIGH (ref 70–99)
HCT VFR BLD CALC: 45.9 % — SIGNIFICANT CHANGE UP (ref 39–50)
HCT VFR BLD CALC: 47 % — SIGNIFICANT CHANGE UP (ref 39–50)
HGB BLD-MCNC: 15.5 G/DL — SIGNIFICANT CHANGE UP (ref 13–17)
HGB BLD-MCNC: 16.1 G/DL — SIGNIFICANT CHANGE UP (ref 13–17)
INR BLD: 1.15 RATIO — SIGNIFICANT CHANGE UP (ref 0.88–1.16)
MAGNESIUM SERPL-MCNC: 2.3 MG/DL — SIGNIFICANT CHANGE UP (ref 1.6–2.6)
MAGNESIUM SERPL-MCNC: 2.3 MG/DL — SIGNIFICANT CHANGE UP (ref 1.6–2.6)
MCHC RBC-ENTMCNC: 31.1 PG — SIGNIFICANT CHANGE UP (ref 27–34)
MCHC RBC-ENTMCNC: 31.5 PG — SIGNIFICANT CHANGE UP (ref 27–34)
MCHC RBC-ENTMCNC: 33.9 GM/DL — SIGNIFICANT CHANGE UP (ref 32–36)
MCHC RBC-ENTMCNC: 34.2 GM/DL — SIGNIFICANT CHANGE UP (ref 32–36)
MCV RBC AUTO: 91.8 FL — SIGNIFICANT CHANGE UP (ref 80–100)
MCV RBC AUTO: 92.1 FL — SIGNIFICANT CHANGE UP (ref 80–100)
PHOSPHATE SERPL-MCNC: 2.7 MG/DL — SIGNIFICANT CHANGE UP (ref 2.5–4.5)
PHOSPHATE SERPL-MCNC: 3.6 MG/DL — SIGNIFICANT CHANGE UP (ref 2.5–4.5)
PLATELET # BLD AUTO: 228 K/UL — SIGNIFICANT CHANGE UP (ref 150–400)
PLATELET # BLD AUTO: 244 K/UL — SIGNIFICANT CHANGE UP (ref 150–400)
POTASSIUM SERPL-MCNC: 3.7 MMOL/L — SIGNIFICANT CHANGE UP (ref 3.5–5.3)
POTASSIUM SERPL-MCNC: 4 MMOL/L — SIGNIFICANT CHANGE UP (ref 3.5–5.3)
POTASSIUM SERPL-SCNC: 3.7 MMOL/L — SIGNIFICANT CHANGE UP (ref 3.5–5.3)
POTASSIUM SERPL-SCNC: 4 MMOL/L — SIGNIFICANT CHANGE UP (ref 3.5–5.3)
PROTHROM AB SERPL-ACNC: 12.5 SEC — SIGNIFICANT CHANGE UP (ref 9.8–12.7)
RBC # BLD: 5 M/UL — SIGNIFICANT CHANGE UP (ref 4.2–5.8)
RBC # BLD: 5.1 M/UL — SIGNIFICANT CHANGE UP (ref 4.2–5.8)
RBC # FLD: 13.1 % — SIGNIFICANT CHANGE UP (ref 10.3–14.5)
RBC # FLD: 13.1 % — SIGNIFICANT CHANGE UP (ref 10.3–14.5)
RH IG SCN BLD-IMP: POSITIVE — SIGNIFICANT CHANGE UP
SODIUM SERPL-SCNC: 136 MMOL/L — SIGNIFICANT CHANGE UP (ref 135–145)
SODIUM SERPL-SCNC: 137 MMOL/L — SIGNIFICANT CHANGE UP (ref 135–145)
WBC # BLD: 9.4 K/UL — SIGNIFICANT CHANGE UP (ref 3.8–10.5)
WBC # BLD: 9.6 K/UL — SIGNIFICANT CHANGE UP (ref 3.8–10.5)
WBC # FLD AUTO: 9.4 K/UL — SIGNIFICANT CHANGE UP (ref 3.8–10.5)
WBC # FLD AUTO: 9.6 K/UL — SIGNIFICANT CHANGE UP (ref 3.8–10.5)

## 2017-12-18 PROCEDURE — 99233 SBSQ HOSP IP/OBS HIGH 50: CPT

## 2017-12-18 PROCEDURE — 99255 IP/OBS CONSLTJ NEW/EST HI 80: CPT

## 2017-12-18 PROCEDURE — 70450 CT HEAD/BRAIN W/O DYE: CPT | Mod: 26

## 2017-12-18 RX ORDER — ENOXAPARIN SODIUM 100 MG/ML
40 INJECTION SUBCUTANEOUS
Qty: 0 | Refills: 0 | Status: DISCONTINUED | OUTPATIENT
Start: 2017-12-18 | End: 2018-01-07

## 2017-12-18 RX ORDER — DEXTROSE MONOHYDRATE, SODIUM CHLORIDE, AND POTASSIUM CHLORIDE 50; .745; 4.5 G/1000ML; G/1000ML; G/1000ML
1000 INJECTION, SOLUTION INTRAVENOUS
Qty: 0 | Refills: 0 | Status: DISCONTINUED | OUTPATIENT
Start: 2017-12-18 | End: 2017-12-18

## 2017-12-18 RX ORDER — HUMAN INSULIN 100 [IU]/ML
4 INJECTION, SUSPENSION SUBCUTANEOUS EVERY 6 HOURS
Qty: 0 | Refills: 0 | Status: DISCONTINUED | OUTPATIENT
Start: 2017-12-18 | End: 2017-12-21

## 2017-12-18 RX ADMIN — Medication 100 MILLIGRAM(S): at 22:23

## 2017-12-18 RX ADMIN — DEXTROSE MONOHYDRATE, SODIUM CHLORIDE, AND POTASSIUM CHLORIDE 75 MILLILITER(S): 50; .745; 4.5 INJECTION, SOLUTION INTRAVENOUS at 06:44

## 2017-12-18 RX ADMIN — Medication 100 MILLIGRAM(S): at 12:42

## 2017-12-18 RX ADMIN — Medication 2: at 17:15

## 2017-12-18 RX ADMIN — HUMAN INSULIN 2 UNIT(S): 100 INJECTION, SUSPENSION SUBCUTANEOUS at 18:40

## 2017-12-18 RX ADMIN — Medication 100 MILLIGRAM(S): at 06:43

## 2017-12-18 RX ADMIN — Medication 100 MILLIGRAM(S): at 14:09

## 2017-12-18 RX ADMIN — Medication 2: at 22:24

## 2017-12-18 RX ADMIN — AMLODIPINE BESYLATE 10 MILLIGRAM(S): 2.5 TABLET ORAL at 05:36

## 2017-12-18 RX ADMIN — HUMAN INSULIN 2 UNIT(S): 100 INJECTION, SUSPENSION SUBCUTANEOUS at 12:42

## 2017-12-18 RX ADMIN — HUMAN INSULIN 2 UNIT(S): 100 INJECTION, SUSPENSION SUBCUTANEOUS at 00:53

## 2017-12-18 RX ADMIN — ENOXAPARIN SODIUM 40 MILLIGRAM(S): 100 INJECTION SUBCUTANEOUS at 23:52

## 2017-12-18 RX ADMIN — HUMAN INSULIN 2 UNIT(S): 100 INJECTION, SUSPENSION SUBCUTANEOUS at 05:36

## 2017-12-18 NOTE — PROGRESS NOTE ADULT - SUBJECTIVE AND OBJECTIVE BOX
HPI:  57 year old male pmhx of htn coming in with 1 day of dizziness found to have vermian hemorrhage. Patient states he takes no medication and has no pmhx but reportedly has hx of htn. Was transferred from [facility] for spontaneous IPH. Pt started to have dizzy and lightheaded when bedning over yesterday. Patient staes that dizziness did not go away this morning so he went ot the hospital. Denies generalized weakness, and nausea. No fall or recent trauma. No numbness, tingling, weakness. No antiplatelets or anticoagulants. Patient currently denies and headache, n/v, blurry vision, double vision, numbness or weakness.    OVERNIGHT EVENTS:   none reported.    ROS: negative [] unable to obtain as patient is comatose/intubated/aphasic []   Episodes of dizziness, imbalance    VITALS:   T(C): 36.3 (12-18-17 @ 03:00), Max: 36.9 (12-17-17 @ 19:00)  HR: 63 (12-18-17 @ 06:00) (63 - 84)  BP: 163/84 (12-18-17 @ 06:00) (119/63 - 185/85)  RR: 19 (12-18-17 @ 06:00) (14 - 25)  SpO2: 96% (12-18-17 @ 06:00) (94% - 98%)    12-17-17 @ 07:01  -  12-18-17 @ 07:00  --------------------------------------------------------  IN: 1195 mL / OUT: 1250 mL / NET: -55 mL      LABS:                          16.1   9.6   )-----------( 244      ( 18 Dec 2017 06:45 )             47.0     12-16    139  |  103  |  19  ----------------------------<  168<H>  3.9   |  25  |  0.71    Ca    8.6      16 Dec 2017 22:47  Phos  2.7     12-16  Mg     2.3     12-16        MEDS:  MEDICATIONS  (STANDING):  amLODIPine   Tablet 10 milliGRAM(s) Oral daily  dextrose 5%. 1000 milliLiter(s) (50 mL/Hr) IV Continuous <Continuous>  dextrose 50% Injectable 12.5 Gram(s) IV Push once  dextrose 50% Injectable 25 Gram(s) IV Push once  dextrose 50% Injectable 25 Gram(s) IV Push once  docusate sodium 100 milliGRAM(s) Oral daily  influenza   Vaccine 0.5 milliLiter(s) IntraMuscular once  insulin lispro (HumaLOG) corrective regimen sliding scale   SubCutaneous Before meals and at bedtime  insulin NPH human recombinant 2 Unit(s) SubCutaneous every 6 hours  labetalol 100 milliGRAM(s) Oral every 8 hours  senna 2 Tablet(s) Oral at bedtime  sodium chloride 0.9% with potassium chloride 20 mEq/L 1000 milliLiter(s) (75 mL/Hr) IV Continuous <Continuous>      [All pertinent recent Imaging/Reports reviewed]    DEVICES: none.    EXAMINATION:  General:  calm  Neuro:  awake, alert, oriented x 3, prefers Mandarin language, follows commands, moves all extremities, no dysmetria; truncal ataxia.  Cards:  RRR  Respiratory:  CTAB  Adomen:  soft  Extremities:  no edema  Skin:  warm/dry HPI:  57 year old male pmhx of htn coming in with 1 day of dizziness found to have vermian hemorrhage. Patient states he takes no medication and has no pmhx but reportedly has hx of htn. Was transferred from [facility] for spontaneous IPH. Pt started to have dizzy and lightheaded when bedning over yesterday. Patient staes that dizziness did not go away this morning so he went ot the hospital. Denies generalized weakness, and nausea. No fall or recent trauma. No numbness, tingling, weakness. No antiplatelets or anticoagulants. Patient currently denies and headache, n/v, blurry vision, double vision, numbness or weakness.    OVERNIGHT EVENTS:   none reported.    ROS: negative [] unable to obtain as patient is comatose/intubated/aphasic []   Episodes of dizziness, imbalance    VITALS:   T(C): 36.3 (12-18-17 @ 03:00), Max: 36.9 (12-17-17 @ 19:00)  HR: 63 (12-18-17 @ 06:00) (63 - 84)  BP: 163/84 (12-18-17 @ 06:00) (119/63 - 185/85)  RR: 19 (12-18-17 @ 06:00) (14 - 25)  SpO2: 96% (12-18-17 @ 06:00) (94% - 98%)    12-17-17 @ 07:01  -  12-18-17 @ 07:00  --------------------------------------------------------  IN: 1195 mL / OUT: 1250 mL / NET: -55 mL      LABS:                          16.1   9.6   )-----------( 244      ( 18 Dec 2017 06:45 )             47.0     12-16    139  |  103  |  19  ----------------------------<  168<H>  3.9   |  25  |  0.71    Ca    8.6      16 Dec 2017 22:47  Phos  2.7     12-16  Mg     2.3     12-16        MEDS:  MEDICATIONS  (STANDING):  amLODIPine   Tablet 10 milliGRAM(s) Oral daily  dextrose 5%. 1000 milliLiter(s) (50 mL/Hr) IV Continuous <Continuous>  dextrose 50% Injectable 12.5 Gram(s) IV Push once  dextrose 50% Injectable 25 Gram(s) IV Push once  dextrose 50% Injectable 25 Gram(s) IV Push once  docusate sodium 100 milliGRAM(s) Oral daily  influenza   Vaccine 0.5 milliLiter(s) IntraMuscular once  insulin lispro (HumaLOG) corrective regimen sliding scale   SubCutaneous Before meals and at bedtime  insulin NPH human recombinant 2 Unit(s) SubCutaneous every 6 hours  labetalol 100 milliGRAM(s) Oral every 8 hours  senna 2 Tablet(s) Oral at bedtime  sodium chloride 0.9% with potassium chloride 20 mEq/L 1000 milliLiter(s) (75 mL/Hr) IV Continuous <Continuous>      [All pertinent recent Imaging/Reports reviewed]    DEVICES: none.    EXAMINATION:  General:  calm  Neuro:  awake, alert, oriented x 3, prefers Mandarin language, follows commands, moves all extremities, BL UE dysmetria L>R; truncal ataxia.  Cards:  RRR  Respiratory:  CTAB  Adomen:  soft  Extremities:  no edema  Skin:  warm/dry

## 2017-12-18 NOTE — DISCHARGE NOTE ADULT - CARE PROVIDERS DIRECT ADDRESSES
[email or web address]

## 2017-12-18 NOTE — OCCUPATIONAL THERAPY INITIAL EVALUATION ADULT - ADDITIONAL COMMENTS
CT Brain: Acute intra-axial hemorrhage in the cerebellar vermis causing mass effect on the fourth ventricle. Mild prominence of the third and lateral ventricles. Heterogeneous calcified mass in the   extracalvarial soft tissues of the right occipital bone.  CTA Brain: Patent intracranial circulation. No flow-limiting stenosis or occlusion.   CTA Neck: Patent cervical vasculature. No flow limiting stenosis or occlusion.

## 2017-12-18 NOTE — DISCHARGE NOTE ADULT - NS AS DC STROKE ED MATERIALS
Stroke Education Booklet/Prescribed Medications/Need for Followup After Discharge/Call 911 for Stroke/Risk Factors for Stroke/Stroke Warning Signs and Symptoms

## 2017-12-18 NOTE — DISCHARGE NOTE ADULT - CARE PLAN
Principal Discharge DX:	Hemorrhagic stroke  Goal:	Secondary Stroke Prevention  Instructions for follow-up, activity and diet:	Please follow up with your primary medical doctor within one to two weeks of discharge from the hospital.  Please follow up with your stroke neurologist within one to two weeks of discharge from the hospital.  Please continue to take your medications as prescribed by your doctor.  Please continue to participate in your rehab. Principal Discharge DX:	Hemorrhagic stroke  Goal:	Secondary Stroke Prevention  Instructions for follow-up, activity and diet:	Please follow up with your primary medical doctor within one to two weeks of discharge from the hospital.  Please follow up with your stroke neurologist within one to two weeks of discharge from the hospital.  Please follow up with your endocrinologist within one to two weeks of discharge from the hospital to help continue to control your blood glucose levels.  Please continue to take your medications as prescribed by your doctor.  Please continue to participate in your rehab. Principal Discharge DX:	Hemorrhagic stroke  Goal:	Secondary Stroke Prevention  Instructions for follow-up, activity and diet:	Please follow up with your primary medical doctor within one to two weeks of discharge from the hospital.  Please follow up with your stroke neurologist within one to two weeks of discharge from the hospital.  Please follow up with your endocrinologist within one to two weeks of discharge from the hospital to help continue to control your blood glucose levels.  Please continue to take your medications as prescribed by your doctor.  Please be sure to monitor your blood pressure at least 3-4 times a week and maintain it under 130/80.  Please continue to participate in your rehab. Principal Discharge DX:	Hemorrhagic stroke  Goal:	Secondary Stroke Prevention  Instructions for follow-up, activity and diet:	Please follow up with your primary medical doctor within one week of discharge from the hospital.  Please follow up with your stroke neurologist within one week of discharge from the hospital.  Please follow up with your endocrinologist within one to two weeks of discharge from the hospital to help continue to control your blood glucose levels.  Please continue to take your medications as prescribed by your doctor.  Please be sure to monitor your blood pressure at least 3-4 times a week and maintain it under 130/80.  Please continue to participate in your rehab.

## 2017-12-18 NOTE — OCCUPATIONAL THERAPY INITIAL EVALUATION ADULT - MD ORDER
OT Initial Evaluation  Increase as Tolerated OT Initial Evaluation  Increase as Tolerated  Functional Evaluation 12/19

## 2017-12-18 NOTE — DISCHARGE NOTE ADULT - PLAN OF CARE
Secondary Stroke Prevention Please follow up with your primary medical doctor within one to two weeks of discharge from the hospital.  Please follow up with your stroke neurologist within one to two weeks of discharge from the hospital.  Please continue to take your medications as prescribed by your doctor.  Please continue to participate in your rehab. Please follow up with your primary medical doctor within one to two weeks of discharge from the hospital.  Please follow up with your stroke neurologist within one to two weeks of discharge from the hospital.  Please follow up with your endocrinologist within one to two weeks of discharge from the hospital to help continue to control your blood glucose levels.  Please continue to take your medications as prescribed by your doctor.  Please continue to participate in your rehab. Please follow up with your primary medical doctor within one to two weeks of discharge from the hospital.  Please follow up with your stroke neurologist within one to two weeks of discharge from the hospital.  Please follow up with your endocrinologist within one to two weeks of discharge from the hospital to help continue to control your blood glucose levels.  Please continue to take your medications as prescribed by your doctor.  Please be sure to monitor your blood pressure at least 3-4 times a week and maintain it under 130/80.  Please continue to participate in your rehab. Please follow up with your primary medical doctor within one week of discharge from the hospital.  Please follow up with your stroke neurologist within one week of discharge from the hospital.  Please follow up with your endocrinologist within one to two weeks of discharge from the hospital to help continue to control your blood glucose levels.  Please continue to take your medications as prescribed by your doctor.  Please be sure to monitor your blood pressure at least 3-4 times a week and maintain it under 130/80.  Please continue to participate in your rehab.

## 2017-12-18 NOTE — DISCHARGE NOTE ADULT - PROVIDER TOKENS
TOKCARMELA:'[id number]' FREE:[LAST:[Sunil],FIRST:[Carlos],PHONE:[phone]],FAX:[(   [phone]    -],ADDRESS:[[address] [place] [address]]

## 2017-12-18 NOTE — DISCHARGE NOTE ADULT - MEDICATION SUMMARY - MEDICATIONS TO TAKE
I will START or STAY ON the medications listed below when I get home from the hospital:    lisinopril 40 mg oral tablet  -- 1 tab(s) by mouth once a day  -- Indication: For Essential hypertension    insulin isophane (NPH) 100 units/mL human recombinant subcutaneous suspension  -- 4 unit(s) subcutaneous 4 times a day (before meals and at bedtime)  -- Indication: For Diabetes    atorvastatin 20 mg oral tablet  -- 1 tab(s) by mouth once a day (at bedtime)  -- Indication: For Hyperlipidemia    metoprolol tartrate 25 mg oral tablet  -- 0.5 tab(s) by mouth 2 times a day   -- It is very important that you take or use this exactly as directed.  Do not skip doses or discontinue unless directed by your doctor.  May cause drowsiness.  Alcohol may intensify this effect.  Use care when operating dangerous machinery.  Some non-prescription drugs may aggravate your condition.  Read all labels carefully.  If a warning appears, check with your doctor before taking.  Take with food or milk.  This drug may impair the ability to drive or operate machinery.  Use care until you become familiar with its effects.    -- Indication: For Essential hypertension    amLODIPine 10 mg oral tablet  -- 1 tab(s) by mouth once a day  -- Indication: For Essential hypertension    docusate sodium 100 mg oral capsule  -- 1 cap(s) by mouth once a day  -- Indication: For Constipation    senna oral tablet  -- 2 tab(s) by mouth once a day (at bedtime)  -- Indication: For Constipation I will START or STAY ON the medications listed below when I get home from the hospital:    lisinopril 40 mg oral tablet  -- 1 tab(s) by mouth once a day  -- Indication: For Essential hypertension    metFORMIN 500 mg oral tablet  -- 1 tab(s) by mouth 2 times a day   -- Check with your doctor before becoming pregnant.  Do not drink alcoholic beverages when taking this medication.  It is very important that you take or use this exactly as directed.  Do not skip doses or discontinue unless directed by your doctor.  Obtain medical advice before taking any non-prescription drugs as some may affect the action of this medication.  Take with food or milk.    -- Indication: For Diabetes    atorvastatin 20 mg oral tablet  -- 1 tab(s) by mouth once a day (at bedtime)  -- Indication: For Hyperlipidemia    metoprolol tartrate 25 mg oral tablet  -- 0.5 tab(s) by mouth 2 times a day   -- It is very important that you take or use this exactly as directed.  Do not skip doses or discontinue unless directed by your doctor.  May cause drowsiness.  Alcohol may intensify this effect.  Use care when operating dangerous machinery.  Some non-prescription drugs may aggravate your condition.  Read all labels carefully.  If a warning appears, check with your doctor before taking.  Take with food or milk.  This drug may impair the ability to drive or operate machinery.  Use care until you become familiar with its effects.    -- Indication: For Essential hypertension    amLODIPine 10 mg oral tablet  -- 1 tab(s) by mouth once a day  -- Indication: For Essential hypertension    docusate sodium 100 mg oral capsule  -- 1 cap(s) by mouth once a day  -- Indication: For Constipation    senna oral tablet  -- 2 tab(s) by mouth once a day (at bedtime)  -- Indication: For Constipation I will START or STAY ON the medications listed below when I get home from the hospital:    Glucometer, Strips and Lancets  -- each transdermally once a day   -- Indication: For Diabetes    lisinopril 40 mg oral tablet  -- 1 tab(s) by mouth once a day  -- Indication: For Essential hypertension    metFORMIN 500 mg oral tablet  -- 1 tab(s) by mouth 2 times a day   -- Check with your doctor before becoming pregnant.  Do not drink alcoholic beverages when taking this medication.  It is very important that you take or use this exactly as directed.  Do not skip doses or discontinue unless directed by your doctor.  Obtain medical advice before taking any non-prescription drugs as some may affect the action of this medication.  Take with food or milk.    -- Indication: For Diabetes    atorvastatin 20 mg oral tablet  -- 1 tab(s) by mouth once a day (at bedtime)  -- Indication: For Hyperlipidemia    metoprolol tartrate 25 mg oral tablet  -- 0.5 tab(s) by mouth 2 times a day   -- It is very important that you take or use this exactly as directed.  Do not skip doses or discontinue unless directed by your doctor.  May cause drowsiness.  Alcohol may intensify this effect.  Use care when operating dangerous machinery.  Some non-prescription drugs may aggravate your condition.  Read all labels carefully.  If a warning appears, check with your doctor before taking.  Take with food or milk.  This drug may impair the ability to drive or operate machinery.  Use care until you become familiar with its effects.    -- Indication: For Essential hypertension    amLODIPine 10 mg oral tablet  -- 1 tab(s) by mouth once a day  -- Indication: For Essential hypertension    docusate sodium 100 mg oral capsule  -- 1 cap(s) by mouth once a day  -- Indication: For Constipation    senna oral tablet  -- 2 tab(s) by mouth once a day (at bedtime)  -- Indication: For Constipation I will START or STAY ON the medications listed below when I get home from the hospital:    Glucometer, Strips and Lancets  -- each transdermally once a day   -- Indication: For Diabetes    lisinopril 40 mg oral tablet  -- 1 tab(s) by mouth once a day  -- Indication: For HTN (hypertension)    metFORMIN 500 mg oral tablet  -- 1 tab(s) by mouth 2 times a day   -- Check with your doctor before becoming pregnant.  Do not drink alcoholic beverages when taking this medication.  It is very important that you take or use this exactly as directed.  Do not skip doses or discontinue unless directed by your doctor.  Obtain medical advice before taking any non-prescription drugs as some may affect the action of this medication.  Take with food or milk.    -- Indication: For Diabetes    Benadryl 25 mg oral tablet  -- 1 tab(s) by mouth 1 to 2 times a day PRN  -- May cause drowsiness.  Alcohol may intensify this effect.  Use care when operating dangerous machinery.  Obtain medical advice before taking any non-prescription drugs as some may affect the action of this medication.    -- Indication: For Insomnia    atorvastatin 20 mg oral tablet  -- 1 tab(s) by mouth once a day (at bedtime)  -- Indication: For HLD    metoprolol tartrate 25 mg oral tablet  -- 0.5 tab(s) by mouth 2 times a day   -- It is very important that you take or use this exactly as directed.  Do not skip doses or discontinue unless directed by your doctor.  May cause drowsiness.  Alcohol may intensify this effect.  Use care when operating dangerous machinery.  Some non-prescription drugs may aggravate your condition.  Read all labels carefully.  If a warning appears, check with your doctor before taking.  Take with food or milk.  This drug may impair the ability to drive or operate machinery.  Use care until you become familiar with its effects.    -- Indication: For HTN (hypertension)    amLODIPine 10 mg oral tablet  -- 1 tab(s) by mouth once a day  -- Indication: For HTN (hypertension)    docusate sodium 100 mg oral capsule  -- 1 cap(s) by mouth once a day  -- Indication: For Constipation    senna oral tablet  -- 2 tab(s) by mouth once a day (at bedtime)  -- Indication: For Constipation

## 2017-12-18 NOTE — DISCHARGE NOTE ADULT - PATIENT PORTAL LINK FT
“You can access the Follow[employer] Patient Portal, offered by [facility], by registering with the following website: [email or web address]”

## 2017-12-18 NOTE — PROGRESS NOTE ADULT - SUBJECTIVE AND OBJECTIVE BOX
Patient seen and examined at bedside.    T(C): 36.7 (12-16-17 @ 03:00), Max: 37.2 (12-15-17 @ 19:25)  HR: 64 (12-16-17 @ 07:00) (64 - 83)  BP: 162/79 (12-16-17 @ 07:00) (88/46 - 173/82)  RR: 17 (12-16-17 @ 07:00) (16 - 23)  SpO2: 98% (12-16-17 @ 07:00) (94% - 100%)  Wt(kg): --    Exam:    AOx3, Following Commands    CN: PERRL, EOMI, no facial droop    Motor: 5/5 throughout, no drift    Sensation intact to light touch    Reflexes: no clonus     bilateral dysmetria

## 2017-12-18 NOTE — DISCHARGE NOTE ADULT - NS AS DC STROKE ANTICOAG CONTRAIN
patient does not have atrial fibrillation/flutter patient does not have atrial fibrillation/flutter/CVA, Hemorrhagic

## 2017-12-18 NOTE — OCCUPATIONAL THERAPY INITIAL EVALUATION ADULT - PERTINENT HX OF CURRENT PROBLEM, REHAB EVAL
58 yo M p/w 1 day of dizziness found to have vermian hemorrhage. Pt states he takes no medication and has no pmhx but reportedly has hx of htn. Was transferred from [facility] for spontaneous IPH. Pt started to have dizzy and lightheaded when bending over yesterday. Pt staes that dizziness did not go away this morning so he went to the hospital. See below

## 2017-12-18 NOTE — PROGRESS NOTE ADULT - ASSESSMENT
58 yo M with h/o HTN, presented with cerebellar hemorhage/4th ventr IVH.    NEURO:    q2h neuro checks;   hydro watch for possible EVD given 4th ventricular hemorrhage;   fall precautions  CTH stable  possible angio in AM as per NSx - pending plan  possible Tx stroke unit  OOB, PT/OT involvement    CARDS:  -150, start norvasc 10 mg/d  add Labetalol 100 q100h    PULM:  sat > 92%    RENAL:  NS@75 while NPO    GASTRO:  tolerates PO  ---> Stress ulcer prophylaxis:      HEME:  monitor H/H    ---> DVT prophylaxis: SCDs, hold anticoagulation, possible EVD/shunt    ENDO:  euglycemia. send a1c    ID:  afebrile    Code status:  Full code  Disposition:  ICU    This patient was at high risk of neurologic deterioration and/or death due to: hydrocephalus, brain compression 56 yo M with h/o HTN, presented with cerebellar hemorhage/4th ventr IVH.    NEURO:    q2h neuro checks;   hydro watch for possible EVD given 4th ventricular hemorrhage;   fall precautions  CTH stable  possible Tx stroke unit  OOB, PT/OT involvement    CARDS:  -160,   on norvasc 10 mg/d  add Labetalol 100 q100h    PULM:  sat > 92%    RENAL:  NS@75 while NPO    GASTRO:  tolerates PO  ---> Stress ulcer prophylaxis:      HEME:  monitor H/H    ---> DVT prophylaxis: SCDs, Lovenox start tonight    ENDO:  euglycemia. send a1c    ID:  afebrile    Code status:  Full code  Disposition:  ICU    This patient was at high risk of neurologic deterioration and/or death due to: hydrocephalus, brain compression

## 2017-12-18 NOTE — DISCHARGE NOTE ADULT - COMMUNITY RESOURCES
Sergey Mccoy [facility]   [phone] [place] [id number] [phone]   Your appointment is on Thursday January 4, 2018 at 2:15 PM  Please bring Photo ID, Proof of Income, and Discharge paperwork.

## 2017-12-18 NOTE — DISCHARGE NOTE ADULT - CARE PROVIDER_API CALL
Carlos Barber (MB[employer]), Neurology; Vascular Neurology  [address] [address]  Phone: [phone]  Fax: [phone] Carlos Barber  [address] [facility]  Phone: [phone]  Fax: (   )    -

## 2017-12-18 NOTE — DISCHARGE NOTE ADULT - HOSPITAL COURSE
HPI:  57 year old male pmhx of htn coming in with 1 day of dizziness found to have vermian hemorrhage. Patient states he takes no medication and has no pmhx but reportedly has hx of htn. Was transferred from [facility] for spontaneous IPH. Pt started to have dizzy and lightheaded when bedning over yesterday. Patient staes that dizziness did not go away this morning so he went ot the hospital. Denies generalized weakness, and nausea. No fall or recent trauma. No numbness, tingling, weakness. No antiplatelets or anticoagulants. Patient currently denies and headache, n/v, blurry vision, double vision, numbness or weakness. (15 Dec 2017 18:58)    CT Head w/o exhibited Acute intra-axial hemorrhage in the cerebellar vermis causing mass effect on the fourth ventricle. Mild prominence of the third and lateral ventricles. Heterogeneous calcified mass in the extracalvarial soft tissues of the right occipital bone.    CTA Head exhibited Patent intracranial circulation. No flow-limiting stenosis or occlusion.    CT Neck exhibited Patent cervical vasculature. No flow limiting stenosis or occlusion.     MRI of the Head shows as described on patient's CT midline cerebellar hemorrhage with mass effect on the fourth ventricle and some mild rostral hydrocephalus suggested with mild dilatation of the temporal horns and the lateral ventricles. There is fairly prominent microvascular ischemic type changes identified given the patient's age. Punctate focus of signal hyperintensity on diffusion in the left parietal occipital region consistent with an acute punctate focus of infarct.    TTE done shows no PFO or Thrombus.    Labs show an HbA1c of 6.7 and an LDL of 93. Endocrine called, not appropriate for an inpatient workup as HbA1c was not greatly elevated and hospital glucose levels were well controlled. Will do outpatient work up.    PT/OT evaluated the pt and decided his disposition to be ______, while a swallow eval showed the pt to be capable of tolerating a regular diet. HPI:  57 year old male pmhx of htn coming in with 1 day of dizziness found to have vermian hemorrhage. Patient states he takes no medication and has no pmhx but reportedly has hx of htn. Was transferred from [facility] for spontaneous IPH. Pt started to have dizzy and lightheaded when bedning over yesterday. Patient staes that dizziness did not go away this morning so he went ot the hospital. Denies generalized weakness, and nausea. No fall or recent trauma. No numbness, tingling, weakness. No antiplatelets or anticoagulants. Patient currently denies and headache, n/v, blurry vision, double vision, numbness or weakness.     CT Head w/o exhibited Acute intra-axial hemorrhage in the cerebellar vermis causing mass effect on the fourth ventricle. Mild prominence of the third and lateral ventricles. Heterogeneous calcified mass in the extracalvarial soft tissues of the right occipital bone.    CTA Head exhibited Patent intracranial circulation. No flow-limiting stenosis or occlusion.    CT Neck exhibited Patent cervical vasculature. No flow limiting stenosis or occlusion.     MRI of the Head shows as described on patient's CT midline cerebellar hemorrhage with mass effect on the fourth ventricle and some mild rostral hydrocephalus suggested with mild dilatation of the temporal horns and the lateral ventricles. There is fairly prominent microvascular ischemic type changes identified given the patient's age. Punctate focus of signal hyperintensity on diffusion in the left parietal occipital region consistent with an acute punctate focus of infarct.    TTE done shows no PFO or Thrombus.    Labs show an HbA1c of 6.7 and an LDL of 93. Endocrine called, not appropriate for an inpatient workup as HbA1c was not greatly elevated and hospital glucose levels were well controlled. Will do outpatient work up.    PT/OT evaluated the pt and decided his disposition to be Rehab, while tolerating a regular diet.    Patient and family decided to go to [facility] for further therapy and management

## 2017-12-18 NOTE — OCCUPATIONAL THERAPY INITIAL EVALUATION ADULT - LIVES WITH, PROFILE
Pt states he lives alone in apartment with elevator access. Pt I in ADLs and ambulation prior to admission/alone

## 2017-12-18 NOTE — PROGRESS NOTE ADULT - SUBJECTIVE AND OBJECTIVE BOX
Patient seen and examined  T(C): 37 (12-18-17 @ 19:00), Max: 37 (12-18-17 @ 19:00)  HR: 70 (12-18-17 @ 19:00) (63 - 84)  BP: 122/60 (12-18-17 @ 19:00) (108/88 - 170/90)  RR: 22 (12-18-17 @ 19:00) (11 - 25)  SpO2: 95% (12-18-17 @ 19:00) (94% - 98%)  12-17-17 @ 07:01  -  12-18-17 @ 07:00  --------------------------------------------------------  IN: 1195 mL / OUT: 1250 mL / NET: -55 mL    12-18-17 @ 07:01  -  12-18-17 @ 22:50  --------------------------------------------------------  IN: 525 mL / OUT: 1525 mL / NET: -1000 mL    acetaminophen   Tablet 650 milliGRAM(s) Oral every 6 hours PRN  acetaminophen   Tablet. 650 milliGRAM(s) Oral every 6 hours PRN  amLODIPine   Tablet 10 milliGRAM(s) Oral daily  dextrose 5%. 1000 milliLiter(s) IV Continuous <Continuous>  dextrose 50% Injectable 12.5 Gram(s) IV Push once  dextrose 50% Injectable 25 Gram(s) IV Push once  dextrose 50% Injectable 25 Gram(s) IV Push once  dextrose Gel 1 Dose(s) Oral once PRN  docusate sodium 100 milliGRAM(s) Oral daily  glucagon  Injectable 1 milliGRAM(s) IntraMuscular once PRN  influenza   Vaccine 0.5 milliLiter(s) IntraMuscular once  insulin lispro (HumaLOG) corrective regimen sliding scale   SubCutaneous Before meals and at bedtime  labetalol 100 milliGRAM(s) Oral every 8 hours  labetalol Injectable 10 milliGRAM(s) IV Push every 6 hours PRN  senna 2 Tablet(s) Oral at bedtime    Neuro exam unchanged, nonfocal\    Plan:  Continue same management   Increase NPH to 4 u q8 hrs  and start lovenox 40 mg sc QHS    Rafia Burgess NSCU ATTENDING

## 2017-12-19 LAB
ANION GAP SERPL CALC-SCNC: 12 MMOL/L — SIGNIFICANT CHANGE UP (ref 5–17)
BUN SERPL-MCNC: 20 MG/DL — SIGNIFICANT CHANGE UP (ref 7–23)
CALCIUM SERPL-MCNC: 8.5 MG/DL — SIGNIFICANT CHANGE UP (ref 8.4–10.5)
CHLORIDE SERPL-SCNC: 101 MMOL/L — SIGNIFICANT CHANGE UP (ref 96–108)
CO2 SERPL-SCNC: 24 MMOL/L — SIGNIFICANT CHANGE UP (ref 22–31)
CREAT SERPL-MCNC: 0.69 MG/DL — SIGNIFICANT CHANGE UP (ref 0.5–1.3)
GLUCOSE BLDC GLUCOMTR-MCNC: 112 MG/DL — HIGH (ref 70–99)
GLUCOSE BLDC GLUCOMTR-MCNC: 126 MG/DL — HIGH (ref 70–99)
GLUCOSE BLDC GLUCOMTR-MCNC: 146 MG/DL — HIGH (ref 70–99)
GLUCOSE BLDC GLUCOMTR-MCNC: 156 MG/DL — HIGH (ref 70–99)
GLUCOSE BLDC GLUCOMTR-MCNC: 159 MG/DL — HIGH (ref 70–99)
GLUCOSE SERPL-MCNC: 143 MG/DL — HIGH (ref 70–99)
HCT VFR BLD CALC: 47.2 % — SIGNIFICANT CHANGE UP (ref 39–50)
HGB BLD-MCNC: 16.2 G/DL — SIGNIFICANT CHANGE UP (ref 13–17)
MAGNESIUM SERPL-MCNC: 2.3 MG/DL — SIGNIFICANT CHANGE UP (ref 1.6–2.6)
MCHC RBC-ENTMCNC: 31.6 PG — SIGNIFICANT CHANGE UP (ref 27–34)
MCHC RBC-ENTMCNC: 34.4 GM/DL — SIGNIFICANT CHANGE UP (ref 32–36)
MCV RBC AUTO: 91.8 FL — SIGNIFICANT CHANGE UP (ref 80–100)
PHOSPHATE SERPL-MCNC: 2.9 MG/DL — SIGNIFICANT CHANGE UP (ref 2.5–4.5)
PLATELET # BLD AUTO: 243 K/UL — SIGNIFICANT CHANGE UP (ref 150–400)
POTASSIUM SERPL-MCNC: 4.1 MMOL/L — SIGNIFICANT CHANGE UP (ref 3.5–5.3)
POTASSIUM SERPL-SCNC: 4.1 MMOL/L — SIGNIFICANT CHANGE UP (ref 3.5–5.3)
RBC # BLD: 5.14 M/UL — SIGNIFICANT CHANGE UP (ref 4.2–5.8)
RBC # FLD: 13.2 % — SIGNIFICANT CHANGE UP (ref 10.3–14.5)
SODIUM SERPL-SCNC: 137 MMOL/L — SIGNIFICANT CHANGE UP (ref 135–145)
WBC # BLD: 10.9 K/UL — HIGH (ref 3.8–10.5)
WBC # FLD AUTO: 10.9 K/UL — HIGH (ref 3.8–10.5)

## 2017-12-19 PROCEDURE — 99232 SBSQ HOSP IP/OBS MODERATE 35: CPT

## 2017-12-19 PROCEDURE — 70450 CT HEAD/BRAIN W/O DYE: CPT | Mod: 26

## 2017-12-19 PROCEDURE — 99233 SBSQ HOSP IP/OBS HIGH 50: CPT

## 2017-12-19 RX ORDER — SODIUM CHLORIDE 9 MG/ML
1000 INJECTION INTRAMUSCULAR; INTRAVENOUS; SUBCUTANEOUS
Qty: 0 | Refills: 0 | Status: DISCONTINUED | OUTPATIENT
Start: 2017-12-19 | End: 2017-12-21

## 2017-12-19 RX ADMIN — HUMAN INSULIN 4 UNIT(S): 100 INJECTION, SUSPENSION SUBCUTANEOUS at 17:20

## 2017-12-19 RX ADMIN — SENNA PLUS 2 TABLET(S): 8.6 TABLET ORAL at 22:22

## 2017-12-19 RX ADMIN — Medication 2: at 12:58

## 2017-12-19 RX ADMIN — Medication 100 MILLIGRAM(S): at 13:38

## 2017-12-19 RX ADMIN — HUMAN INSULIN 4 UNIT(S): 100 INJECTION, SUSPENSION SUBCUTANEOUS at 00:09

## 2017-12-19 RX ADMIN — Medication 100 MILLIGRAM(S): at 05:28

## 2017-12-19 RX ADMIN — HUMAN INSULIN 4 UNIT(S): 100 INJECTION, SUSPENSION SUBCUTANEOUS at 13:36

## 2017-12-19 RX ADMIN — Medication 100 MILLIGRAM(S): at 22:22

## 2017-12-19 RX ADMIN — Medication 2: at 22:48

## 2017-12-19 RX ADMIN — ENOXAPARIN SODIUM 40 MILLIGRAM(S): 100 INJECTION SUBCUTANEOUS at 17:12

## 2017-12-19 RX ADMIN — Medication 100 MILLIGRAM(S): at 15:58

## 2017-12-19 RX ADMIN — AMLODIPINE BESYLATE 10 MILLIGRAM(S): 2.5 TABLET ORAL at 05:28

## 2017-12-19 RX ADMIN — HUMAN INSULIN 4 UNIT(S): 100 INJECTION, SUSPENSION SUBCUTANEOUS at 05:29

## 2017-12-19 RX ADMIN — SODIUM CHLORIDE 75 MILLILITER(S): 9 INJECTION INTRAMUSCULAR; INTRAVENOUS; SUBCUTANEOUS at 22:48

## 2017-12-19 NOTE — CONSULT NOTE ADULT - ATTENDING COMMENTS
Seen and examined with Dr. Cuevas.  Agree with his note.  Will follow clinical course to determine most appropriate rehabilitation placement.
VASCULAR NEUROLOGY ATTENDING  The patient is seen and examined the history and imaging are reviewed. I agree with the resident note unless otherwise noted. Patient with presumed hypertensive vermian ICH. Would continue to watch in NSCU concern for acute hydrocephalus given location and 4th ventricle compression. SBP control to ~160. MRI/A when able.

## 2017-12-19 NOTE — PROGRESS NOTE ADULT - SUBJECTIVE AND OBJECTIVE BOX
HPI:  57 year old male pmhx of htn coming in with 1 day of dizziness found to have vermian hemorrhage. Patient states he takes no medication and has no pmhx but reportedly has hx of htn. Was transferred from [facility] for spontaneous IPH. Pt started to have dizzy and lightheaded when bedning over yesterday. Patient staes that dizziness did not go away this morning so he went ot the hospital. Denies generalized weakness, and nausea. No fall or recent trauma. No numbness, tingling, weakness. No antiplatelets or anticoagulants. Patient currently denies and headache, n/v, blurry vision, double vision, numbness or weakness.      Overnight Events: none reported.    ROS: negative [] unable to obtain as patient is comatose/intubated/aphasic []   VITALS:   T(C): 36.9 (12-19-17 @ 03:00), Max: 37 (12-18-17 @ 19:00)  HR: 66 (12-19-17 @ 06:00) (54 - 81)  BP: 177/93 (12-19-17 @ 06:00) (108/88 - 177/93)  RR: 18 (12-19-17 @ 06:00) (11 - 25)  SpO2: 99% (12-19-17 @ 06:00) (94% - 99%)    12-18-17 @ 07:01  -  12-19-17 @ 07:00  --------------------------------------------------------  IN: 765 mL / OUT: 2050 mL / NET: -1285 mL      LABS:                          15.5   9.4   )-----------( 228      ( 18 Dec 2017 22:03 )             45.9     12-18    137  |  102  |  24<H>  ----------------------------<  202<H>  4.0   |  25  |  0.76    Ca    8.4      18 Dec 2017 22:03  Phos  2.7     12-18  Mg     2.3     12-18      PT/INR - ( 18 Dec 2017 06:45 )   PT: 12.5 sec;   INR: 1.15 ratio         PTT - ( 18 Dec 2017 06:45 )  PTT:29.0 sec  MEDS:  MEDICATIONS  (STANDING):  amLODIPine   Tablet 10 milliGRAM(s) Oral daily  dextrose 5%. 1000 milliLiter(s) (50 mL/Hr) IV Continuous <Continuous>  dextrose 50% Injectable 12.5 Gram(s) IV Push once  dextrose 50% Injectable 25 Gram(s) IV Push once  dextrose 50% Injectable 25 Gram(s) IV Push once  docusate sodium 100 milliGRAM(s) Oral daily  enoxaparin Injectable 40 milliGRAM(s) SubCutaneous <User Schedule>  influenza   Vaccine 0.5 milliLiter(s) IntraMuscular once  insulin lispro (HumaLOG) corrective regimen sliding scale   SubCutaneous Before meals and at bedtime  insulin NPH human recombinant 4 Unit(s) SubCutaneous every 6 hours  labetalol 100 milliGRAM(s) Oral every 8 hours  senna 2 Tablet(s) Oral at bedtime      [All pertinent recent Imaging/Reports reviewed]    DEVICES: [] Restraints [] TOM/HMV []LD [] ET tube [] Trach [] A-line [] Bray [] NGT [] Rectal Tube       EXAMINATION:  General:  calm  Neuro:  awake, alert, oriented x 3, prefers Mandarin language, follows commands, moves all extremities, BL UE dysmetria L>R; truncal ataxia.  Cards:  RRR  Respiratory:  CTAB  Adomen:  soft  Extremities:  no edema  Skin:  warm/dry

## 2017-12-19 NOTE — CONSULT NOTE ADULT - SUBJECTIVE AND OBJECTIVE BOX
HPI:  57 year old male with pmhx of HTN presenting with dizziness. Patient started to feel dizzy and lightheaded when bending over the day prior to admission. 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 At the time of examination, patient complaining of dizziness.     REVIEW OF SYSTEMS  Constitutional - No fever, No fatigue  HEENT - No visual disturbances, No difficulty hearing,  No neck pain  Respiratory - No cough, No wheezing, No shortness of breath  Cardiovascular - No chest pain, No palpitations  Gastrointestinal - No abdominal pain, No nausea, No vomiting, No diarrhea, No constipation  Genitourinary - No dysuria, No frequency, No hematuria, No incontinence  Neurological - No headaches, No loss of strength, No numbness  Skin - No itching, No rashes  Endocrine - No temperature intolerance  Musculoskeletal - No joint pain, No joint swelling, No muscle pain  Psychiatric - No depression, No anxiety  All other review of systems negative    PAST MEDICAL & SURGICAL HISTORY  HTN (hypertension)  Intraparenchymal hematoma of brain    SOCIAL HISTORY  Smoking - Denied  EtOH - Denied   Drugs - Denied    FUNCTIONAL HISTORY  _______ hand dominant  Lives alone in a one level house/apartment with 6 EDGAR   Independent in ambulation, ADL's, transfers prior to hospitalization    CURRENT FUNCTIONAL STATUS  Bed mobility - contact guard to minimum assist  Transfers - contact guard to minimum assist  Gait - 5 feet with minimum assist  ADL's - maximum assist for lower body dressing     FAMILY HISTORY   Reviewed and non-contributory    ALLERGIES  No Known Allergies    VITALS  ICU Vital Signs Last 24 Hrs  T(C): 36.7 (19 Dec 2017 07:00), Max: 37 (18 Dec 2017 19:00)  T(F): 98.1 (19 Dec 2017 07:00), Max: 98.6 (18 Dec 2017 19:00)  HR: 67 (19 Dec 2017 10:47) (54 - 81)  BP: 124/76 (19 Dec 2017 09:00) (108/62 - 177/93)  RR: 17 (19 Dec 2017 10:47) (11 - 25)  SpO2: 98% (19 Dec 2017 10:47) (95% - 99%)      PHYSICAL EXAM  Constitutional - NAD, Comfortable  HEENT - NCAT, EOMI  Neck - Supple  Chest - CTA bilaterally  Cardiovascular - positive S1S2  Abdomen - Soft, NTND, no rebound tenderness noted  Extremities - No C/C/E, No calf tenderness   Neurologic Exam -                    Cognitive - Awake, Alert, AAO to self, place, date, year, situation     Communication - Fluent, No dysarthria     Attention - Intact, able to recite days of week backwards     Memory - Intact, able to recall 3/3 items after 3 minutes     Cranial Nerves - CN 2-12 grossly intact     Motor -                     LEFT    UE - ShAB 5/5, EF 5/5, EE 5/5, WE 5/5,  5/5                    RIGHT UE - ShAB 5/5, EF 5/5, EE 5/5, WE 5/5,  5/5                    LEFT    LE - HF 5/5, KE 5/5, DF 5/5, PF 5/5                    RIGHT LE - HF 5/5, KE 5/5, DF 5/5, PF 5/5        Sensory - Intact to light touch     Reflexes - DTR intact and symmetrical, Negative Delatorre's bilaterally, Negative Babinski's bilaterally      Coordination - Finger-to-nose intact bilaterally   Psychiatric - Affect WNL    RECENT LABS/IMAGING                        15.5   9.4   )-----------( 228      ( 18 Dec 2017 22:03 )             45.9     12-18    137  |  102  |  24<H>  ----------------------------<  202<H>  4.0   |  25  |  0.76    Ca    8.4      18 Dec 2017 22:03  Phos  2.7     12-18  Mg     2.3     12-18    PT/INR - ( 18 Dec 2017 06:45 )   PT: 12.5 sec;   INR: 1.15 ratio    PTT - ( 18 Dec 2017 06:45 )  PTT:29.0 sec    HbA1C - 6.7  TSH - 0.53  CHL - 167  Tri - 94  HDL - 55  LDL - 93    CT Brain: Acute intra-axial hemorrhage in the cerebellar vermis causing mass effect on the fourth ventricle. Mild prominence of the third and lateral ventricles. Heterogeneous calcified mass in the extracalvarial soft tissues of the right occipital bone.    CTA Brain: Patent intracranial circulation. No flow-limiting stenosis or occlusion.     CTA Neck: Patent cervical vasculature. No flow limiting stenosis or occlusion.  MEDICATIONS   MEDICATIONS  (STANDING):  amLODIPine   Tablet 10 milliGRAM(s) Oral daily  docusate sodium 100 milliGRAM(s) Oral daily  enoxaparin Injectable 40 milliGRAM(s) SubCutaneous <User Schedule>  insulin lispro (HumaLOG) corrective regimen sliding scale   SubCutaneous Before meals and at bedtime  insulin NPH human recombinant 4 Unit(s) SubCutaneous every 6 hours  labetalol 100 milliGRAM(s) Oral every 8 hours  senna 2 Tablet(s) Oral at bedtime    MEDICATIONS  (PRN):  acetaminophen   Tablet 650 milliGRAM(s) Oral every 6 hours PRN For Temp greater than 38 C (100.4 F)  labetalol Injectable 10 milliGRAM(s) IV Push every 6 hours PRN sbp > 160    ASSESSMENT/PLAN  56 y/o male with cerebellar hemorrhage/4th ventricle IVH with functional, gait, and ADL impairments.    Disposition - Acute inpatient rehabilitation where the patient will be able to tolerate 3 hours of therapy to attain their functional goals  PT - ROM, Bed mobility, Transfers, Ambulation with assistive device  OT - ADLs, ROM  SLP - Dysphagia eval and treat  Precautions - Falls  DVT Prophylaxis - enoxaparin Injectable 40 milliGRAM(s) SubCutaneous <User Schedule>  Weight bearing status - no restrictions  Skin - Turn Q2hrs  Diet - consistent carb HPI:  57 year old male with pmhx of HTN presenting with dizziness. Patient started to feel dizzy and lightheaded when bending over the day prior to admission. 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 At the time of examination, patient complaining of dizziness and headache. No other acute complaints.    REVIEW OF SYSTEMS  Constitutional - No fever, No fatigue  HEENT - No visual disturbances, No difficulty hearing,  No neck pain  Respiratory - No cough, No wheezing, No shortness of breath  Cardiovascular - No chest pain, No palpitations  Gastrointestinal - No abdominal pain, No nausea, No vomiting, No diarrhea, No constipation  Genitourinary - No dysuria, No frequency, No hematuria, No incontinence  Neurological - as per HPI  Skin - No itching, No rashes  Endocrine - No temperature intolerance  Musculoskeletal - No joint pain, No joint swelling, No muscle pain  Psychiatric - No depression, No anxiety  All other review of systems negative    PAST MEDICAL & SURGICAL HISTORY  HTN (hypertension)  Intraparenchymal hematoma of brain    SOCIAL HISTORY  Smoking - Denied  EtOH - Denied   Drugs - Denied    FUNCTIONAL HISTORY  _______ hand dominant  Lives alone in a one level house/apartment with 6 EDGAR   Independent in ambulation, ADL's, transfers prior to hospitalization    CURRENT FUNCTIONAL STATUS  Bed mobility - contact guard to minimum assist  Transfers - contact guard to minimum assist  Gait - 5 feet with minimum assist  ADL's - maximum assist for lower body dressing     FAMILY HISTORY   Reviewed and non-contributory    ALLERGIES  No Known Allergies    VITALS  ICU Vital Signs Last 24 Hrs  T(C): 36.7 (19 Dec 2017 07:00), Max: 37 (18 Dec 2017 19:00)  T(F): 98.1 (19 Dec 2017 07:00), Max: 98.6 (18 Dec 2017 19:00)  HR: 67 (19 Dec 2017 10:47) (54 - 81)  BP: 124/76 (19 Dec 2017 09:00) (108/62 - 177/93)  RR: 17 (19 Dec 2017 10:47) (11 - 25)  SpO2: 98% (19 Dec 2017 10:47) (95% - 99%)      PHYSICAL EXAM  Constitutional - NAD, Comfortable  HEENT - NCAT, EOMI  Neck - Supple  Chest - CTA bilaterally  Cardiovascular - positive S1S2  Abdomen - Soft, NTND, no rebound tenderness noted  Extremities - No C/C/E, No calf tenderness   Neurologic Exam -                    Cognitive - Awake, Alert, AAO to self, place, date, year, situation     Communication - Fluent, No dysarthria     Cranial Nerves - CN 2-12 grossly intact     Motor -                     LEFT    UE - ShAB 5/5, EF 5/5, EE 5/5, WE 5/5,  5/5                    RIGHT UE - ShAB 5/5, EF 5/5, EE 5/5, WE 5/5,  5/5                    LEFT    LE - HF 5/5, KE 5/5, DF 5/5, PF 5/5                    RIGHT LE - HF 5/5, KE 5/5, DF 5/5, PF 5/5        Sensory - Intact to light touch     Reflexes - DTR intact and symmetrical, Negative Delatorre's bilaterally, Negative Babinski's bilaterally      Coordination - Finger-to-nose intact bilaterally   Psychiatric - Affect WNL    RECENT LABS/IMAGING                        15.5   9.4   )-----------( 228      ( 18 Dec 2017 22:03 )             45.9     12-18    137  |  102  |  24<H>  ----------------------------<  202<H>  4.0   |  25  |  0.76    Ca    8.4      18 Dec 2017 22:03  Phos  2.7     12-18  Mg     2.3     12-18    PT/INR - ( 18 Dec 2017 06:45 )   PT: 12.5 sec;   INR: 1.15 ratio    PTT - ( 18 Dec 2017 06:45 )  PTT:29.0 sec    HbA1C - 6.7  TSH - 0.53  CHL - 167  Tri - 94  HDL - 55  LDL - 93    CT Brain: Acute intra-axial hemorrhage in the cerebellar vermis causing mass effect on the fourth ventricle. Mild prominence of the third and lateral ventricles. Heterogeneous calcified mass in the extracalvarial soft tissues of the right occipital bone.    CTA Brain: Patent intracranial circulation. No flow-limiting stenosis or occlusion.     CTA Neck: Patent cervical vasculature. No flow limiting stenosis or occlusion.  MEDICATIONS   MEDICATIONS  (STANDING):  amLODIPine   Tablet 10 milliGRAM(s) Oral daily  docusate sodium 100 milliGRAM(s) Oral daily  enoxaparin Injectable 40 milliGRAM(s) SubCutaneous <User Schedule>  insulin lispro (HumaLOG) corrective regimen sliding scale   SubCutaneous Before meals and at bedtime  insulin NPH human recombinant 4 Unit(s) SubCutaneous every 6 hours  labetalol 100 milliGRAM(s) Oral every 8 hours  senna 2 Tablet(s) Oral at bedtime    MEDICATIONS  (PRN):  acetaminophen   Tablet 650 milliGRAM(s) Oral every 6 hours PRN For Temp greater than 38 C (100.4 F)  labetalol Injectable 10 milliGRAM(s) IV Push every 6 hours PRN sbp > 160    ASSESSMENT/PLAN  58 y/o male with cerebellar hemorrhage/4th ventricle IVH with functional, gait, and ADL impairments.    Disposition - pending progress with bedside therapy. Patient may benefit from acute inpatient rehabilitation where the patient will be able to tolerate 3 hours of therapy to attain their functional goals when medically cleared by primary team. PM&R team will follow.   PT - ROM, Bed mobility, Transfers, Ambulation with assistive device  OT - ADLs, ROM  SLP - Dysphagia eval and treat  Precautions - Falls  DVT Prophylaxis - enoxaparin Injectable 40 milliGRAM(s) SubCutaneous <User Schedule>  Weight bearing status - no restrictions  Skin - Turn Q2hrs  Diet - consistent carb HPI:  57 year old male with pmhx of HTN presenting with dizziness. Patient started to feel dizzy and lightheaded when bending over the day prior to admission. 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 At the time of examination, patient complaining of dizziness and headache. No other acute complaints.    REVIEW OF SYSTEMS  Constitutional - No fever, No fatigue  HEENT - No visual disturbances, No difficulty hearing,  No neck pain  Respiratory - No cough, No wheezing, No shortness of breath  Cardiovascular - No chest pain, No palpitations  Gastrointestinal - No abdominal pain, No nausea, No vomiting, No diarrhea, No constipation  Genitourinary - No dysuria, No frequency, No hematuria, No incontinence  Neurological - as per HPI  Skin - No itching, No rashes  Endocrine - No temperature intolerance  Musculoskeletal - No joint pain, No joint swelling, No muscle pain  Psychiatric - No depression, No anxiety  All other review of systems negative    PAST MEDICAL & SURGICAL HISTORY  HTN (hypertension)  Intraparenchymal hematoma of brain    SOCIAL HISTORY  Smoking - Denied  EtOH - Denied   Drugs - Denied    FUNCTIONAL HISTORYt  Lives alone in a one level house/apartment with 6 EDGAR   Independent in ambulation, ADL's, transfers prior to hospitalization    CURRENT FUNCTIONAL STATUS  Bed mobility - contact guard to minimum assist  Transfers - contact guard to minimum assist  Gait - 5 feet with minimum assist  ADL's - maximum assist for lower body dressing     FAMILY HISTORY   Reviewed and non-contributory    ALLERGIES  No Known Allergies    VITALS  ICU Vital Signs Last 24 Hrs  T(C): 36.7 (19 Dec 2017 07:00), Max: 37 (18 Dec 2017 19:00)  T(F): 98.1 (19 Dec 2017 07:00), Max: 98.6 (18 Dec 2017 19:00)  HR: 67 (19 Dec 2017 10:47) (54 - 81)  BP: 124/76 (19 Dec 2017 09:00) (108/62 - 177/93)  RR: 17 (19 Dec 2017 10:47) (11 - 25)  SpO2: 98% (19 Dec 2017 10:47) (95% - 99%)      PHYSICAL EXAM  Constitutional - NAD, Comfortable  HEENT - NCAT, EOMI  Neck - Supple  Chest - CTA bilaterally  Cardiovascular - positive S1S2  Abdomen - Soft, NTND, no rebound tenderness noted  Extremities - No C/C/E, No calf tenderness   Neurologic Exam -                    Cognitive - Awake, Alert, AAO to self, place, date, year, situation     Communication - Fluent, No dysarthria     Cranial Nerves - CN 2-12 grossly intact     Motor -                     LEFT    UE - ShAB 5/5, EF 5/5, EE 5/5, WE 5/5,  5/5                    RIGHT UE - ShAB 5/5, EF 5/5, EE 5/5, WE 5/5,  5/5                    LEFT    LE - HF 5/5, KE 5/5, DF 5/5, PF 5/5                    RIGHT LE - HF 5/5, KE 5/5, DF 5/5, PF 5/5        Sensory - Intact to light touch     Reflexes - DTR intact and symmetrical, Negative Delatorre's bilaterally, Negative Babinski's bilaterally      Coordination - Finger-to-nose intact bilaterally   Psychiatric - Affect WNL    RECENT LABS/IMAGING                        15.5   9.4   )-----------( 228      ( 18 Dec 2017 22:03 )             45.9     12-18    137  |  102  |  24<H>  ----------------------------<  202<H>  4.0   |  25  |  0.76    Ca    8.4      18 Dec 2017 22:03  Phos  2.7     12-18  Mg     2.3     12-18    PT/INR - ( 18 Dec 2017 06:45 )   PT: 12.5 sec;   INR: 1.15 ratio    PTT - ( 18 Dec 2017 06:45 )  PTT:29.0 sec    HbA1C - 6.7  TSH - 0.53  CHL - 167  Tri - 94  HDL - 55  LDL - 93    CT Brain: Acute intra-axial hemorrhage in the cerebellar vermis causing mass effect on the fourth ventricle. Mild prominence of the third and lateral ventricles. Heterogeneous calcified mass in the extracalvarial soft tissues of the right occipital bone.    CTA Brain: Patent intracranial circulation. No flow-limiting stenosis or occlusion.     CTA Neck: Patent cervical vasculature. No flow limiting stenosis or occlusion.  MEDICATIONS   MEDICATIONS  (STANDING):  amLODIPine   Tablet 10 milliGRAM(s) Oral daily  docusate sodium 100 milliGRAM(s) Oral daily  enoxaparin Injectable 40 milliGRAM(s) SubCutaneous <User Schedule>  insulin lispro (HumaLOG) corrective regimen sliding scale   SubCutaneous Before meals and at bedtime  insulin NPH human recombinant 4 Unit(s) SubCutaneous every 6 hours  labetalol 100 milliGRAM(s) Oral every 8 hours  senna 2 Tablet(s) Oral at bedtime    MEDICATIONS  (PRN):  acetaminophen   Tablet 650 milliGRAM(s) Oral every 6 hours PRN For Temp greater than 38 C (100.4 F)  labetalol Injectable 10 milliGRAM(s) IV Push every 6 hours PRN sbp > 160    ASSESSMENT/PLAN  58 y/o male with cerebellar hemorrhage/4th ventricle IVH with functional, gait, and ADL impairments.    Disposition - pending progress with bedside therapy. Patient may benefit from acute inpatient rehabilitation where the patient will be able to tolerate 3 hours of therapy to attain their functional goals when medically cleared by primary team. PM&R team will follow.   PT - ROM, Bed mobility, Transfers, Ambulation with assistive device  OT - ADLs, ROM  SLP - Dysphagia eval and treat  Precautions - Falls  DVT Prophylaxis - enoxaparin Injectable 40 milliGRAM(s) SubCutaneous <User Schedule>  Weight bearing status - no restrictions  Skin - Turn Q2hrs  Diet - consistent carb

## 2017-12-19 NOTE — PROGRESS NOTE ADULT - ASSESSMENT
56 yo M with h/o HTN, presented with cerebellar hemorhage/4th ventr IVH.    NEURO:    q2h neuro checks;   hydro watch for possible EVD given significant 4th ventricular hemorrhage;   fall precautions  CTH stable  possible Tx stroke unit today  OOB, PT/OT involvement    CARDS:  -160,   on norvasc 10 mg/d, Labetalol 100 q100h    PULM:  sat > 92%    RENAL:  NS@75 while NPO    GASTRO:  tolerates PO  ---> Stress ulcer prophylaxis:      HEME:  monitor H/H    ---> DVT prophylaxis: SCDs, Lovenox start tonight    ENDO:  euglycemia. send a1c    ID:  afebrile    Code status:  Full code  Disposition:  ICU    This patient was at high risk of neurologic deterioration and/or death due to: hydrocephalus, brain compression 58 yo M with h/o HTN, presented with cerebellar hemorhage/4th ventr IVH.    NEURO:    q2h neuro checks;   hydro watch for possible EVD given significant 4th ventricular hemorrhage;   fall precautions  CTH stable  Pending Tx stroke unit   OOB, PT/OT involvement    CARDS:  -160,   on norvasc 10 mg/d, Labetalol 100 q100h    PULM:  sat > 92%    RENAL:  NS@75 while NPO    GASTRO:  tolerates PO  ---> Stress ulcer prophylaxis:      HEME:  monitor H/H    ---> DVT prophylaxis: SCDs, Lovenox start tonight    ENDO:  euglycemia. send a1c    ID:  afebrile    Code status:  Full code  Disposition:  ICU    This patient was at high risk of neurologic deterioration and/or death due to: hydrocephalus, brain compression

## 2017-12-19 NOTE — PROGRESS NOTE ADULT - SUBJECTIVE AND OBJECTIVE BOX
Patient seen and examined  exam remains stable  T(C): 36.8 (12-19-17 @ 19:00), Max: 36.9 (12-19-17 @ 03:00)  HR: 64 (12-19-17 @ 21:00) (54 - 74)  BP: 141/73 (12-19-17 @ 21:00) (108/62 - 177/93)  RR: 20 (12-19-17 @ 21:00) (17 - 23)  SpO2: 97% (12-19-17 @ 21:00) (95% - 99%)  12-18-17 @ 07:01  -  12-19-17 @ 07:00  --------------------------------------------------------  IN: 765 mL / OUT: 2050 mL / NET: -1285 mL    12-19-17 @ 07:01  -  12-19-17 @ 21:53  --------------------------------------------------------  IN: 0 mL / OUT: 700 mL / NET: -700 mL    acetaminophen   Tablet 650 milliGRAM(s) Oral every 6 hours PRN  acetaminophen   Tablet. 650 milliGRAM(s) Oral every 6 hours PRN  amLODIPine   Tablet 10 milliGRAM(s) Oral daily  dextrose 5%. 1000 milliLiter(s) IV Continuous <Continuous>  dextrose 50% Injectable 12.5 Gram(s) IV Push once  dextrose 50% Injectable 25 Gram(s) IV Push once  dextrose 50% Injectable 25 Gram(s) IV Push once  dextrose Gel 1 Dose(s) Oral once PRN  docusate sodium 100 milliGRAM(s) Oral daily  enoxaparin Injectable 40 milliGRAM(s) SubCutaneous <User Schedule>  glucagon  Injectable 1 milliGRAM(s) IntraMuscular once PRN  influenza   Vaccine 0.5 milliLiter(s) IntraMuscular once  insulin lispro (HumaLOG) corrective regimen sliding scale   SubCutaneous Before meals and at bedtime  insulin NPH human recombinant 4 Unit(s) SubCutaneous every 6 hours  labetalol 100 milliGRAM(s) Oral every 8 hours  labetalol Injectable 10 milliGRAM(s) IV Push every 6 hours PRN  senna 2 Tablet(s) Oral at bedtime    A/P/ cerebellar hemorhage  No change in management    [facility]afia Burgess [facility] attending Patient seen and examined  exam remains stable  T(C): 36.8 (12-19-17 @ 19:00), Max: 36.9 (12-19-17 @ 03:00)  HR: 64 (12-19-17 @ 21:00) (54 - 74)  BP: 141/73 (12-19-17 @ 21:00) (108/62 - 177/93)  RR: 20 (12-19-17 @ 21:00) (17 - 23)  SpO2: 97% (12-19-17 @ 21:00) (95% - 99%)  12-18-17 @ 07:01  -  12-19-17 @ 07:00  --------------------------------------------------------  IN: 765 mL / OUT: 2050 mL / NET: -1285 mL    12-19-17 @ 07:01  -  12-19-17 @ 21:53  --------------------------------------------------------  IN: 0 mL / OUT: 700 mL / NET: -700 mL    acetaminophen   Tablet 650 milliGRAM(s) Oral every 6 hours PRN  acetaminophen   Tablet. 650 milliGRAM(s) Oral every 6 hours PRN  amLODIPine   Tablet 10 milliGRAM(s) Oral daily  dextrose 5%. 1000 milliLiter(s) IV Continuous <Continuous>  dextrose 50% Injectable 12.5 Gram(s) IV Push once  dextrose 50% Injectable 25 Gram(s) IV Push once  dextrose 50% Injectable 25 Gram(s) IV Push once  dextrose Gel 1 Dose(s) Oral once PRN  docusate sodium 100 milliGRAM(s) Oral daily  enoxaparin Injectable 40 milliGRAM(s) SubCutaneous <User Schedule>  glucagon  Injectable 1 milliGRAM(s) IntraMuscular once PRN  influenza   Vaccine 0.5 milliLiter(s) IntraMuscular once  insulin lispro (HumaLOG) corrective regimen sliding scale   SubCutaneous Before meals and at bedtime  insulin NPH human recombinant 4 Unit(s) SubCutaneous every 6 hours  labetalol 100 milliGRAM(s) Oral every 8 hours  labetalol Injectable 10 milliGRAM(s) IV Push every 6 hours PRN  senna 2 Tablet(s) Oral at bedtime    A/P/ 58 yo M with h/o HTN, presented with cerebellar hemorhage/4th ventr IVH.  NPO after midnight for angio  Started him on NS 75ml/hr for renal protection   No change in management    Rafia Burgess [facility] Patient seen and examined  exam remains stable  T(C): 36.8 (12-19-17 @ 19:00), Max: 36.9 (12-19-17 @ 03:00)  HR: 64 (12-19-17 @ 21:00) (54 - 74)  BP: 141/73 (12-19-17 @ 21:00) (108/62 - 177/93)  RR: 20 (12-19-17 @ 21:00) (17 - 23)  SpO2: 97% (12-19-17 @ 21:00) (95% - 99%)  12-18-17 @ 07:01  -  12-19-17 @ 07:00  --------------------------------------------------------  IN: 765 mL / OUT: 2050 mL / NET: -1285 mL    12-19-17 @ 07:01  -  12-19-17 @ 21:53  --------------------------------------------------------  IN: 0 mL / OUT: 700 mL / NET: -700 mL    acetaminophen   Tablet 650 milliGRAM(s) Oral every 6 hours PRN  acetaminophen   Tablet. 650 milliGRAM(s) Oral every 6 hours PRN  amLODIPine   Tablet 10 milliGRAM(s) Oral daily  dextrose 5%. 1000 milliLiter(s) IV Continuous <Continuous>  dextrose 50% Injectable 12.5 Gram(s) IV Push once  dextrose 50% Injectable 25 Gram(s) IV Push once  dextrose 50% Injectable 25 Gram(s) IV Push once  dextrose Gel 1 Dose(s) Oral once PRN  docusate sodium 100 milliGRAM(s) Oral daily  enoxaparin Injectable 40 milliGRAM(s) SubCutaneous <User Schedule>  glucagon  Injectable 1 milliGRAM(s) IntraMuscular once PRN  influenza   Vaccine 0.5 milliLiter(s) IntraMuscular once  insulin lispro (HumaLOG) corrective regimen sliding scale   SubCutaneous Before meals and at bedtime  insulin NPH human recombinant 4 Unit(s) SubCutaneous every 6 hours  labetalol 100 milliGRAM(s) Oral every 8 hours  labetalol Injectable 10 milliGRAM(s) IV Push every 6 hours PRN  senna 2 Tablet(s) Oral at bedtime    A/P/ 56 yo M with h/o HTN, presented with cerebellar hemorhage/4th ventr IVH.  NPO after midnight for angio  Started him on NS 75ml/hr for renal protection   No change in management  ISS, Hold NPH given NPO     Rafia Burgess [facility] attending

## 2017-12-20 LAB
ANION GAP SERPL CALC-SCNC: 14 MMOL/L — SIGNIFICANT CHANGE UP (ref 5–17)
BUN SERPL-MCNC: 16 MG/DL — SIGNIFICANT CHANGE UP (ref 7–23)
CALCIUM SERPL-MCNC: 8.6 MG/DL — SIGNIFICANT CHANGE UP (ref 8.4–10.5)
CHLORIDE SERPL-SCNC: 99 MMOL/L — SIGNIFICANT CHANGE UP (ref 96–108)
CO2 SERPL-SCNC: 21 MMOL/L — LOW (ref 22–31)
CREAT SERPL-MCNC: 0.62 MG/DL — SIGNIFICANT CHANGE UP (ref 0.5–1.3)
GLUCOSE BLDC GLUCOMTR-MCNC: 126 MG/DL — HIGH (ref 70–99)
GLUCOSE BLDC GLUCOMTR-MCNC: 129 MG/DL — HIGH (ref 70–99)
GLUCOSE SERPL-MCNC: 188 MG/DL — HIGH (ref 70–99)
HCT VFR BLD CALC: 48.5 % — SIGNIFICANT CHANGE UP (ref 39–50)
HGB BLD-MCNC: 16.6 G/DL — SIGNIFICANT CHANGE UP (ref 13–17)
MCHC RBC-ENTMCNC: 31.7 PG — SIGNIFICANT CHANGE UP (ref 27–34)
MCHC RBC-ENTMCNC: 34.2 GM/DL — SIGNIFICANT CHANGE UP (ref 32–36)
MCV RBC AUTO: 92.6 FL — SIGNIFICANT CHANGE UP (ref 80–100)
PLATELET # BLD AUTO: 305 K/UL — SIGNIFICANT CHANGE UP (ref 150–400)
POTASSIUM SERPL-MCNC: 4 MMOL/L — SIGNIFICANT CHANGE UP (ref 3.5–5.3)
POTASSIUM SERPL-SCNC: 4 MMOL/L — SIGNIFICANT CHANGE UP (ref 3.5–5.3)
RBC # BLD: 5.24 M/UL — SIGNIFICANT CHANGE UP (ref 4.2–5.8)
RBC # FLD: 13.3 % — SIGNIFICANT CHANGE UP (ref 10.3–14.5)
SODIUM SERPL-SCNC: 134 MMOL/L — LOW (ref 135–145)
WBC # BLD: 11.8 K/UL — HIGH (ref 3.8–10.5)
WBC # FLD AUTO: 11.8 K/UL — HIGH (ref 3.8–10.5)

## 2017-12-20 PROCEDURE — 36223 PLACE CATH CAROTID/INOM ART: CPT | Mod: 50

## 2017-12-20 PROCEDURE — 36227 PLACE CATH XTRNL CAROTID: CPT | Mod: 50

## 2017-12-20 PROCEDURE — 99253 IP/OBS CNSLTJ NEW/EST LOW 45: CPT | Mod: GC

## 2017-12-20 PROCEDURE — 99232 SBSQ HOSP IP/OBS MODERATE 35: CPT

## 2017-12-20 PROCEDURE — 36226 PLACE CATH VERTEBRAL ART: CPT | Mod: 50

## 2017-12-20 PROCEDURE — 99233 SBSQ HOSP IP/OBS HIGH 50: CPT

## 2017-12-20 RX ORDER — LISINOPRIL 2.5 MG/1
40 TABLET ORAL DAILY
Qty: 0 | Refills: 0 | Status: DISCONTINUED | OUTPATIENT
Start: 2017-12-20 | End: 2018-01-07

## 2017-12-20 RX ORDER — LISINOPRIL 2.5 MG/1
5 TABLET ORAL DAILY
Qty: 0 | Refills: 0 | Status: DISCONTINUED | OUTPATIENT
Start: 2017-12-20 | End: 2017-12-20

## 2017-12-20 RX ORDER — HYDRALAZINE HCL 50 MG
5 TABLET ORAL ONCE
Qty: 0 | Refills: 0 | Status: DISCONTINUED | OUTPATIENT
Start: 2017-12-20 | End: 2017-12-20

## 2017-12-20 RX ADMIN — AMLODIPINE BESYLATE 10 MILLIGRAM(S): 2.5 TABLET ORAL at 22:28

## 2017-12-20 RX ADMIN — Medication 2: at 22:29

## 2017-12-20 RX ADMIN — HUMAN INSULIN 4 UNIT(S): 100 INJECTION, SUSPENSION SUBCUTANEOUS at 00:01

## 2017-12-20 RX ADMIN — LISINOPRIL 40 MILLIGRAM(S): 2.5 TABLET ORAL at 14:30

## 2017-12-20 RX ADMIN — SENNA PLUS 2 TABLET(S): 8.6 TABLET ORAL at 22:31

## 2017-12-20 RX ADMIN — ENOXAPARIN SODIUM 40 MILLIGRAM(S): 100 INJECTION SUBCUTANEOUS at 18:05

## 2017-12-20 RX ADMIN — AMLODIPINE BESYLATE 10 MILLIGRAM(S): 2.5 TABLET ORAL at 05:51

## 2017-12-20 NOTE — PROGRESS NOTE ADULT - ASSESSMENT
57M with cerebellar vermian hemorrhage    -Pre-op for angio in AM  -Neurochecks q1h  -Sodium goal 145  -SBP < 160  -Pain control

## 2017-12-20 NOTE — PROGRESS NOTE ADULT - ASSESSMENT
ASSESSMENT: This is a 57yman with one day of dizziness prior to admission. Patient presented to ED when the dizziness persisted and was found to have cerebellar hemorrhage and 4th ventricle IVH (Vermian Hemorrhage) for which he was transferred to [facility]. Patient is neurologically improved. Patient is s/p cerebral angiogram today, to determine source of Vermian hemorrhage. Post- op diagnosis : Findings do not identify the presence of aneurysm or AVM. Patient transferred now to stroke unit.    NEURO: Continue close monitoring for neurologic deterioration. Gradual return to normotension, avoid hypotension. Physical therapy/OT/Speech eval/treatment.     ANTITHROMBOTIC THERAPY:  Not in setting of hemorrhage    PULMONARY: Protecting airway, saturating well on room air.     CARDIOVASCULAR: Continue cardiac monitoring                              SBP goal: 140-160    GASTROINTESTINAL: Ulcer prophylaxis, dysphagia screen passed.      Diet: Regular. Consistent Carb.    RENAL: BUN/Cr stable, good urine output      Na Goal: Greater than 135     Bray: d/c as soon as possible.    HEMATOLOGY: H/H stable, Platelets normal      DVT ppx: Heparin s.c [] LMWH [x]     ID: afebrile, no leukocytosis       DISPOSITION: Rehab or home depending on PT eval once stable and workup is complete.      CORE MEASURES:        Admission NIHSS: No     TPA: [] YES [x] NO      LDL/HDL: NO     Depression Screen: pending     Statin Therapy: NO     Dysphagia Screen: [x] PASS [] FAIL     Smoking [] YES [x] NO      Afib [] YES [x] NO     Stroke Education [x] YES [] NO

## 2017-12-20 NOTE — PROGRESS NOTE ADULT - SUBJECTIVE AND OBJECTIVE BOX
HPI:  57 year old male pmhx of htn coming in with 1 day of dizziness found to have vermian hemorrhage. Patient states he takes no medication and has no pmhx but reportedly has hx of htn. Was transferred from [facility] for spontaneous IPH. Pt started to have dizzy and lightheaded when bedning over yesterday. Patient staes that dizziness did not go away this morning so he went ot the hospital. Denies generalized weakness, and nausea. No fall or recent trauma. No numbness, tingling, weakness. No antiplatelets or anticoagulants. Patient currently denies and headache, n/v, blurry vision, double vision, numbness or weakness.    Overnight Events: none reported.    ROS: negative [] unable to obtain as patient is comatose/intubated/aphasic []   VITALS:   T(C): 36.8 (12-20-17 @ 07:00), Max: 37 (12-20-17 @ 03:00)  HR: 75 (12-20-17 @ 09:00) (58 - 75)  BP: 165/86 (12-20-17 @ 09:00) (133/76 - 165/86)  RR: 18 (12-20-17 @ 09:00) (17 - 23)  SpO2: 100% (12-20-17 @ 09:00) (96% - 100%)    12-19-17 @ 07:01  -  12-20-17 @ 07:00  --------------------------------------------------------  IN: 675 mL / OUT: 1200 mL / NET: -525 mL    12-20-17 @ 07:01  -  12-20-17 @ 09:55  --------------------------------------------------------  IN: 150 mL / OUT: 0 mL / NET: 150 mL      LABS:                          16.2   10.9  )-----------( 243      ( 19 Dec 2017 22:48 )             47.2     12-19    137  |  101  |  20  ----------------------------<  143<H>  4.1   |  24  |  0.69    Ca    8.5      19 Dec 2017 22:48  Phos  2.9     12-19  Mg     2.3     12-19        MEDS:  MEDICATIONS  (STANDING):  amLODIPine   Tablet 10 milliGRAM(s) Oral daily  dextrose 5%. 1000 milliLiter(s) (50 mL/Hr) IV Continuous <Continuous>  dextrose 50% Injectable 12.5 Gram(s) IV Push once  dextrose 50% Injectable 25 Gram(s) IV Push once  dextrose 50% Injectable 25 Gram(s) IV Push once  docusate sodium 100 milliGRAM(s) Oral daily  enoxaparin Injectable 40 milliGRAM(s) SubCutaneous <User Schedule>  influenza   Vaccine 0.5 milliLiter(s) IntraMuscular once  insulin lispro (HumaLOG) corrective regimen sliding scale   SubCutaneous Before meals and at bedtime  insulin NPH human recombinant 4 Unit(s) SubCutaneous every 6 hours  labetalol 100 milliGRAM(s) Oral every 8 hours  senna 2 Tablet(s) Oral at bedtime  sodium chloride 0.9%. 1000 milliLiter(s) (75 mL/Hr) IV Continuous <Continuous>      [All pertinent recent Imaging/Reports reviewed]    DEVICES: [] Restraints [] TOM/HMV []LD [] ET tube [] Trach [] A-line [] Bray [] NGT [] Rectal Tube       EXAMINATION:  General:  calm  Neuro:  awake, alert, oriented x 3, prefers Mandarin language, follows commands, moves all extremities, mild BL UE dysmetria L>R; truncal ataxia.  Cards:  RRR  Respiratory:  CTAB  Adomen:  soft  Extremities:  no edema  Skin:  warm/dry

## 2017-12-20 NOTE — CHART NOTE - NSCHARTNOTEFT_GEN_A_CORE
Interventional Neuro- Radiology   Procedure Note PA-C    Procedure: Selective Cerebral Angiography   Pre- Procedure Diagnosis:  cerebellar vermian hemorrhage   Post- Procedure Diagnosis:    : Dr. Dale Borrego     Physician Assistant: YAMEL Mishra PA-C  Nurse:                       Ashley Nichols RN  Radiologic Tech:     Arias Garrison   Anesthesiologist:    Dr Nickolas Carroll   Sheath:    I/Os:  Estimated blood loss less than 10cc  IV fluids:     cc     Urine output     cc  Contrast Omnipaque 240      cc         Antibiotics:    Vitals: BP      HR      Spo2        Preliminary Report:  Using a 4 Fr short/long sheath to the right groin under MAC sedation via left vertebral artery,  left common carotid artery, left external carotid artery, right vertebral artery  right common carotid artery, right external carotid artery  a selective cerebral angiography was performed and  demonstrates Official note to follow).  Patient tolerated procedure well, hemodynamically stable, no change in neurological status compared to baseline.  Results discussed with neurosurgery, patient and patient's  family.  Groin sheath was removed,  manual compression held to hemostasis for 21 minutes, no active bleeding, no hematoma, Avitene applied, quick clot and safeguard balloon dressing applied at   STAT labs: CBC BMP    Patient transferred to Neuro IR Interventional Neuro- Radiology   Procedure Note PA-C    Procedure: Selective Cerebral Angiography   Pre- Procedure Diagnosis:  cerebellar vermian hemorrhage   Post- Procedure Diagnosis:    : Dr. Dale Borrego     Physician Assistant: YAMEL Mishra PA-C  Nurse:                       Kathie Mann RN  Radiologic Tech:     Mary Scott LRT   Anesthesiologist:    Dr Nickolas Carroll   Sheath:    I/Os:  Estimated blood loss less than 10cc  IV fluids:     cc   Urine output     cc  Contrast Omnipaque 240      cc         Antibiotics:    Vitals: BP      HR      Spo2        Preliminary Report:  Using a 4 Fr short/long sheath to the right groin under MAC sedation via left vertebral artery,  left common carotid artery, left external carotid artery, right vertebral artery  right common carotid artery, right external carotid artery  a selective cerebral angiography was performed and  demonstrates Official note to follow).  Patient tolerated procedure well, hemodynamically stable, no change in neurological status compared to baseline.  Results discussed with neurosurgery and patient.   Groin sheath was removed, manual compression held to hemostasis for 21 minutes, no active bleeding, no hematoma, Avitene applied, quick clot and safeguard balloon dressing applied at   STAT labs: CBC BMP    Patient transferred to Neuro IR Interventional Neuro- Radiology   Procedure Note PA-C    Procedure: Selective Cerebral Angiography   Pre- Procedure Diagnosis:  cerebellar vermian hemorrhage   Post- Procedure Diagnosis:    : Dr. Dale Borrego     Physician Assistant: YAMEL Mishra PA-C  Nurse:                       Kathie Mann RN  Radiologic Tech:       Mary Scott LRT   Anesthesiologist:      Dr Nickolas Carroll   Sheath:                    4 Prydeinig long sheath     I/Os:  Estimated blood loss less than 10cc  IV fluids:     cc   Urine output     cc  Contrast Omnipaque 240      cc         Antibiotics:    Vitals: BP      HR      Spo2        Preliminary Report:  Using a 4 Prydeinig long sheath to the right groin under MAC sedation via left vertebral artery, left common carotid artery, left external carotid artery, right vertebral artery right common carotid artery, right external carotid artery a selective cerebral angiography was performed and  demonstrates Official note to follow).  Patient tolerated procedure well, hemodynamically stable, no change in neurological status compared to baseline.  Results discussed with neurosurgery and patient.   Groin sheath was removed, manual compression held to hemostasis for 21 minutes, no active bleeding, no hematoma, Avitene applied, quick clot and safeguard balloon dressing applied at   STAT labs: CBC BMP    Patient transferred to Neuro IR Interventional Neuro- Radiology   Procedure Note PA-C    Procedure: Selective Cerebral Angiography   Pre- Procedure Diagnosis:  cerebellar vermian hemorrhage   Post- Procedure Diagnosis:    : Dr. Dale Borrego     Physician Assistant: YAMEL Mishra PA-C  Nurse:                       Kathie Mann RN  Radiologic Tech:       Mary Scott LRT   Anesthesiologist:      Dr Nickolas Carroll   Sheath:                    4 Sudanese long sheath     I/Os:  Estimated blood loss less than 10cc  IV fluids:     cc   Urine output     cc  Contrast Omnipaque 240      cc         Antibiotics:    Vitals: /100   HR 69    Spo2 100       Preliminary Report:  Using a 4 Sudanese long sheath to the right groin under MAC sedation via left vertebral artery, left common carotid artery, left external carotid artery, right vertebral artery right common carotid artery, right external carotid artery a selective cerebral angiography was performed and  demonstrates Official note to follow).  Patient tolerated procedure well, hemodynamically stable, no change in neurological status compared to baseline.  Results discussed with neurosurgery and patient.   Groin sheath was removed, manual compression held to hemostasis for 21 minutes, no active bleeding, no hematoma, Avitene applied, quick clot and safeguard balloon dressing applied at   STAT labs: CBC BMP    Patient transferred to Neuro IR Interventional Neuro- Radiology   Procedure Note PA-C    Procedure: Selective Cerebral Angiography   Pre- Procedure Diagnosis:  cerebellar vermian hemorrhage   Post- Procedure Diagnosis: No source for hemorrhage no aneurysm no AVM     : Dr. Dale Borrego     Physician Assistant: YAMEL Mishra PA-C  Nurse:                       Kathie Mann RN  Radiologic Tech:       Mary Scott LRT   Anesthesiologist:      Dr Nickolas Carroll   Sheath:                    5 Ivorian long sheath     I/Os:  Estimated blood loss less than 10cc  IV fluids:100cc   Urine output 350   cc  Contrast Omnipaque 240      cc         Antibiotics: Ancef 2 grams     Vitals: /100   HR 69    Spo2 100       Preliminary Report:  Using a 4 Ivorian long sheath to the right groin under MAC sedation via left vertebral artery, left common carotid artery, left external carotid artery, right vertebral artery right common carotid artery, right external carotid artery a selective cerebral angiography was performed and  demonstrates Official note to follow).  Patient tolerated procedure well, hemodynamically stable, no change in neurological status compared to baseline.  Results discussed with neurosurgery and patient.   Groin sheath was removed, manual compression held to hemostasis for 21 minutes, no active bleeding, no hematoma, Avitene applied, quick clot and safeguard balloon dressing applied at   STAT labs: CBC BMP    Patient transferred to Neuro IR Interventional Neuro- Radiology   Procedure Note PA-C    Procedure: Selective Cerebral Angiography   Pre- Procedure Diagnosis:  cerebellar vermian hemorrhage   Post- Procedure Diagnosis: No source for hemorrhage no aneurysm no AVM     : Dr. Dale Borrego     Physician Assistant: YAMEL Mishra PA-C  Nurse:                       Kathie Mann RN  Radiologic Tech:       Mary Scott LRT   Anesthesiologist:       Dr Nickolas Carroll   Sheath:                     5 Hebrew long sheath     I/Os:  Estimated blood loss less than 10cc  IV fluids:100cc   Urine output 350cc  Contrast Omnipaque 240 94cc         Antibiotics: Ancef 2 grams     Vitals: /100   HR 69   Spo2 100       Preliminary Report:  Using a 4 Hebrew long sheath to the right groin under MAC sedation via left vertebral artery, left common carotid artery, left external carotid artery, right vertebral artery right common carotid artery, right external carotid artery a selective cerebral angiography was performed and demonstrated Normal pial and dural surfaces. No aneurysms. No venous occlusions. Official note to follow. Patient tolerated procedure well, hemodynamically stable, no change in neurological status compared to baseline.  Results discussed with neurosurgery and patient.   Groin sheath was removed, manual compression held to hemostasis for 21 minutes, no active bleeding,  Avitene applied, quick clot and safeguard balloon dressing applied at 1415  STAT labs: CBC BMP 1830 hours     Patient transferred to Neuro ICU

## 2017-12-20 NOTE — CHART NOTE - NSCHARTNOTEFT_GEN_A_CORE
Interventional Neuro Radiology  Pre-Procedure Note YAMEL    This is a 57 year old right hand dominant male PMHX of HTN coming in with 1 day of dizziness found to have vermian hemorrhage. Patient states he takes no medication and has no pmhx but reportedly has hx of htn. Was transferred from [facility] for spontaneous IPH. Pt started to have dizzy and lightheaded when bedning over yesterday. Patient staes that dizziness did not go away this morning so he went ot the hospital. Denies generalized weakness, and nausea. No fall or recent trauma. No numbness, tingling, weakness. No antiplatelets or anticoagulants. Patient currently denies and headache, n/v, blurry vision, double vision, numbness or weakness. (15 Dec 2017 18:58)      Allergies: No Known Allergies  PMHX: HTN   PSHX: No significant past surgical history  Social History:   FAMILY HISTORY: No pertinent family history in first degree relatives  Current Medications:   acetaminophen   Tablet 650 milliGRAM(s) Oral every 6 hours PRN  acetaminophen   Tablet. 650 milliGRAM(s) Oral every 6 hours PRN  amLODIPine   Tablet 10 milliGRAM(s) Oral daily  dextrose 5%. 1000 milliLiter(s) IV Continuous <Continuous>  dextrose 50% Injectable 12.5 Gram(s) IV Push once  dextrose 50% Injectable 25 Gram(s) IV Push once  dextrose 50% Injectable 25 Gram(s) IV Push once  dextrose Gel 1 Dose(s) Oral once PRN  docusate sodium 100 milliGRAM(s) Oral daily  enoxaparin Injectable 40 milliGRAM(s) SubCutaneous <User Schedule>  glucagon  Injectable 1 milliGRAM(s) IntraMuscular once PRN  influenza   Vaccine 0.5 milliLiter(s) IntraMuscular once  insulin lispro (HumaLOG) corrective regimen sliding scale   SubCutaneous Before meals and at bedtime  insulin NPH human recombinant 4 Unit(s) SubCutaneous every 6 hours  labetalol 100 milliGRAM(s) Oral every 8 hours  labetalol Injectable 10 milliGRAM(s) IV Push every 6 hours PRN  senna 2 Tablet(s) Oral at bedtime  sodium chloride 0.9%. 1000 milliLiter(s) IV   CBC                        16.2   10.9  )-----------( 243                   47.2     BMP    137  |  101  |  20  ----------------------------<  143  4.1   |  24  |  0.69    Blood Bank: o POSITIVE         Assessment/Plan:   This is a 57 year old right hand dominant Male  presents with   Procedure, goals, risks, benefits and alternatives  were discussed with patient and patient's family. All questions were answered. Risks include but are not limited to stroke, vessel injury, hemorrhage, and or right  groin hematoma. Patient demonstrates understanding of all risks involved with this procedure and wishes to continue. Appropriate content was obtained from patient and consent is in the patient's chart. Interventional Neuro Radiology  Pre-Procedure Note PA-C    This is a 57 year old right hand dominant male PMHX of HTN coming in with 1 day of dizziness found to have vermian hemorrhage. Patient states he takes no medication and has no pmhx but reportedly has hx of htn. Was transferred from [facility] for spontaneous IPH. Pt was transported to Neuro IR for a selective cerebral angiography to determine a cause for the cerebellar vermian hemorrhage.    Allergies: No Known Allergies  PMHX: HTN   PSHX: No significant past surgical history  Social History:   FAMILY HISTORY: No pertinent family history in first degree relatives  Current Medications:   acetaminophen   Tablet 650 milliGRAM(s) Oral every 6 hours PRN  acetaminophen   Tablet. 650 milliGRAM(s) Oral every 6 hours PRN  amLODIPine   Tablet 10 milliGRAM(s) Oral daily  dextrose 5%. 1000 milliLiter(s) IV Continuous <Continuous>  dextrose 50% Injectable 12.5 Gram(s) IV Push once  dextrose 50% Injectable 25 Gram(s) IV Push once  dextrose 50% Injectable 25 Gram(s) IV Push once  dextrose Gel 1 Dose(s) Oral once PRN  docusate sodium 100 milliGRAM(s) Oral daily  enoxaparin Injectable 40 milliGRAM(s) SubCutaneous  glucagon  Injectable 1 milliGRAM(s) IntraMuscular once PRN  influenza   Vaccine 0.5 milliLiter(s) IntraMuscular once  insulin lispro (HumaLOG) corrective regimen sliding scale     insulin NPH human recombinant 4 Unit(s) SubCutaneous   labetalol 100 milliGRAM(s) Oral every 8 hours  labetalol Injectable 10 milliGRAM(s) IV Push every 6 hours PRN  senna 2 Tablet(s) Oral at bedtime  sodium chloride 0.9%. 1000 milliLiter(s) IV   CBC                        16.2   10.9  )-----------( 243                   47.2     BMP    137  |  101  |  20  ----------------------------<  143  4.1   |  24  |  0.69    Blood Bank: O POSITIVE available     Assessment/Plan:   This is a 57 year old right hand dominant male with a cerebellar vermian hemorrhage. patient is transported to Neuro IR for a selective cerebral angiography to determine hemorrhage.  Procedure, goals, risks, benefits and alternatives were discussed with patient, via Mandrin  # [id number]. All questions were answered. Risks include but are not limited to stroke, vessel injury, hemorrhage, and or right groin hematoma. Patient demonstrates understanding of all risks involved with this procedure and wishes to continue. Appropriate content was obtained from patient and consent is in the patient's chart.

## 2017-12-20 NOTE — PROGRESS NOTE ADULT - ASSESSMENT
58 yo M with h/o HTN, presented with cerebellar hemorhage/4th ventr IVH.    NEURO:    q2h neuro checks;   hydro watch for possible EVD given significant 4th ventricular hemorrhage;   fall precautions  CTH stable  Pending Tx stroke unit   OOB, PT/OT involvement    CARDS:  -160,   on norvasc 10 mg/d, Labetalol 100 q100h    PULM:  sat > 92%    RENAL:  IVL    GASTRO:  tolerates PO  ---> Stress ulcer prophylaxis:      HEME:  monitor H/H    ---> DVT prophylaxis: SCDs, Lovenox    ENDO:  euglycemia. send a1c    ID:  afebrile    Code status:  Full code  Disposition:  pending transfer to Stroke unit    This patient was at high risk of neurologic deterioration and/or death due to: hydrocephalus, brain compression

## 2017-12-20 NOTE — PROGRESS NOTE ADULT - SUBJECTIVE AND OBJECTIVE BOX
THE PATIENT WAS SEEN AND EXAMINED BY ISABELLA Hammond NP with findings reviewed with Dr. Isaac.  HPI:  57 year old male pmhx of htn coming in with 1 day of dizziness prior to admission. 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.   Patient stated he takes no medication and has no pmhx but reportedly has hx of htn.Denies generalized weakness, and nausea. No fall or recent trauma. No numbness, tingling, weakness. No antiplatelets or anticoagulants. Patient currently denies and headache, n/v, blurry vision, double vision, numbness or weakness.       SUBJECTIVE: No events overnight.  No new neurologic complaints.      acetaminophen   Tablet 650 milliGRAM(s) Oral every 6 hours PRN  acetaminophen   Tablet. 650 milliGRAM(s) Oral every 6 hours PRN  amLODIPine   Tablet 10 milliGRAM(s) Oral daily  dextrose 5%. 1000 milliLiter(s) IV Continuous <Continuous>  dextrose 50% Injectable 12.5 Gram(s) IV Push once  dextrose 50% Injectable 25 Gram(s) IV Push once  dextrose 50% Injectable 25 Gram(s) IV Push once  dextrose Gel 1 Dose(s) Oral once PRN  docusate sodium 100 milliGRAM(s) Oral daily  enoxaparin Injectable 40 milliGRAM(s) SubCutaneous <User Schedule>  glucagon  Injectable 1 milliGRAM(s) IntraMuscular once PRN  influenza   Vaccine 0.5 milliLiter(s) IntraMuscular once  insulin lispro (HumaLOG) corrective regimen sliding scale   SubCutaneous Before meals and at bedtime  insulin NPH human recombinant 4 Unit(s) SubCutaneous every 6 hours  lisinopril 40 milliGRAM(s) Oral daily  senna 2 Tablet(s) Oral at bedtime  sodium chloride 0.9%. 1000 milliLiter(s) IV Continuous <Continuous>      PHYSICAL EXAM:   Vital Signs Last 24 Hrs  T(C): 36.8 (20 Dec 2017 14:40), Max: 37.1 (20 Dec 2017 11:00)  T(F): 98.3 (20 Dec 2017 14:40), Max: 98.7 (20 Dec 2017 11:00)  HR: 71 (20 Dec 2017 15:00) (58 - 75)  BP: 147/74 (20 Dec 2017 15:00) (135/68 - 170/83)  BP(mean): 92 (20 Dec 2017 15:00) (85 - 105)  RR: 19 (20 Dec 2017 15:00) (17 - 23)  SpO2: 100% (20 Dec 2017 15:00) (96% - 100%)    General: Resting in bed. No acute distress  HEENT: PERRL,  EOM intact, visual fields full  Abdomen: Soft, nontender, nondistended   Extremities:  No edema.    NEUROLOGICAL EXAM:  Mental status: Awake, alert, oriented x3. Able to follow simple commands. Speech is fluent.   Cranial Nerves: No facial asymmetry, no nystagmus, no dysarthria, no dysphagia, tongue is midline.  Motor exam: Normal tone, no drift. Strength 5/5 throughout.  Sensation: Intact to light touch   Coordination/ Gait: Bilateral UE dysmetria, L>R.     LABS:                        16.2   10.9  )-----------( 243      ( 19 Dec 2017 22:48 )             47.2    12-19    137  |  101  |  20  ----------------------------<  143<H>  4.1   |  24  |  0.69    Ca    8.5      19 Dec 2017 22:48  Phos  2.9     12-19  Mg     2.3     12-19      Hemoglobin A1C, Whole Blood: 6.7 % (12-17 @ 06:40)      IMAGING:     EXAM:  CT BRAIN                        PROCEDURE DATE:  12/19/2017      INTERPRETATION:  History: Follow-up parenchymal hemorrhage.    Multiple axial sections were performed from base of skull to vertex   without contrast enhancement.    This exam is compared with prior noncontrast head CT performed on   December 18, 2017.    Parenchymal hemorrhage and surrounding edema is again seen in the   posterior fossa midline. This area. Or hemorrhage measures approximately   3.6 x 3.0 cm and previously measured approximately 3.4 x 3.1 cm. Mass   effect on the fourth ventricles again seen and unchanged.    The size and configuration of the prominent lateral third ventricles are   again seen and unchanged.    Periventricular white matter lucency is again seen and unchanged.    Evaluation of the osseous structures with the appropriate window appears   unremarkable    The visualized paranasal sinuses mastoid and middle ear regions appear   clear    Impression: Stable exam when underlying for differences in technique..

## 2017-12-21 LAB
ANION GAP SERPL CALC-SCNC: 10 MMOL/L — SIGNIFICANT CHANGE UP (ref 5–17)
ANION GAP SERPL CALC-SCNC: 10 MMOL/L — SIGNIFICANT CHANGE UP (ref 5–17)
ANION GAP SERPL CALC-SCNC: 9 MMOL/L — SIGNIFICANT CHANGE UP (ref 5–17)
BUN SERPL-MCNC: 21 MG/DL — SIGNIFICANT CHANGE UP (ref 7–23)
BUN SERPL-MCNC: 22 MG/DL — SIGNIFICANT CHANGE UP (ref 7–23)
BUN SERPL-MCNC: 22 MG/DL — SIGNIFICANT CHANGE UP (ref 7–23)
CALCIUM SERPL-MCNC: 8.3 MG/DL — LOW (ref 8.4–10.5)
CALCIUM SERPL-MCNC: 8.7 MG/DL — SIGNIFICANT CHANGE UP (ref 8.4–10.5)
CALCIUM SERPL-MCNC: 8.8 MG/DL — SIGNIFICANT CHANGE UP (ref 8.4–10.5)
CHLORIDE SERPL-SCNC: 101 MMOL/L — SIGNIFICANT CHANGE UP (ref 96–108)
CHLORIDE SERPL-SCNC: 101 MMOL/L — SIGNIFICANT CHANGE UP (ref 96–108)
CHLORIDE SERPL-SCNC: 103 MMOL/L — SIGNIFICANT CHANGE UP (ref 96–108)
CO2 SERPL-SCNC: 20 MMOL/L — LOW (ref 22–31)
CO2 SERPL-SCNC: 24 MMOL/L — SIGNIFICANT CHANGE UP (ref 22–31)
CO2 SERPL-SCNC: 26 MMOL/L — SIGNIFICANT CHANGE UP (ref 22–31)
CREAT SERPL-MCNC: 0.59 MG/DL — SIGNIFICANT CHANGE UP (ref 0.5–1.3)
CREAT SERPL-MCNC: 0.73 MG/DL — SIGNIFICANT CHANGE UP (ref 0.5–1.3)
CREAT SERPL-MCNC: 0.82 MG/DL — SIGNIFICANT CHANGE UP (ref 0.5–1.3)
GLUCOSE BLDC GLUCOMTR-MCNC: 134 MG/DL — HIGH (ref 70–99)
GLUCOSE BLDC GLUCOMTR-MCNC: 152 MG/DL — HIGH (ref 70–99)
GLUCOSE BLDC GLUCOMTR-MCNC: 153 MG/DL — HIGH (ref 70–99)
GLUCOSE SERPL-MCNC: 122 MG/DL — HIGH (ref 70–99)
GLUCOSE SERPL-MCNC: 126 MG/DL — HIGH (ref 70–99)
GLUCOSE SERPL-MCNC: 157 MG/DL — HIGH (ref 70–99)
HCT VFR BLD CALC: 52.1 % — HIGH (ref 39–50)
HGB BLD-MCNC: 17.4 G/DL — HIGH (ref 13–17)
MCHC RBC-ENTMCNC: 31.3 PG — SIGNIFICANT CHANGE UP (ref 27–34)
MCHC RBC-ENTMCNC: 33.5 GM/DL — SIGNIFICANT CHANGE UP (ref 32–36)
MCV RBC AUTO: 93.6 FL — SIGNIFICANT CHANGE UP (ref 80–100)
PLATELET # BLD AUTO: 265 K/UL — SIGNIFICANT CHANGE UP (ref 150–400)
POTASSIUM SERPL-MCNC: 4.2 MMOL/L — SIGNIFICANT CHANGE UP (ref 3.5–5.3)
POTASSIUM SERPL-MCNC: 4.4 MMOL/L — SIGNIFICANT CHANGE UP (ref 3.5–5.3)
POTASSIUM SERPL-MCNC: 7 MMOL/L — CRITICAL HIGH (ref 3.5–5.3)
POTASSIUM SERPL-SCNC: 4.2 MMOL/L — SIGNIFICANT CHANGE UP (ref 3.5–5.3)
POTASSIUM SERPL-SCNC: 4.4 MMOL/L — SIGNIFICANT CHANGE UP (ref 3.5–5.3)
POTASSIUM SERPL-SCNC: 7 MMOL/L — CRITICAL HIGH (ref 3.5–5.3)
RBC # BLD: 5.56 M/UL — SIGNIFICANT CHANGE UP (ref 4.2–5.8)
RBC # FLD: 13.4 % — SIGNIFICANT CHANGE UP (ref 10.3–14.5)
SODIUM SERPL-SCNC: 130 MMOL/L — LOW (ref 135–145)
SODIUM SERPL-SCNC: 137 MMOL/L — SIGNIFICANT CHANGE UP (ref 135–145)
SODIUM SERPL-SCNC: 137 MMOL/L — SIGNIFICANT CHANGE UP (ref 135–145)
WBC # BLD: 11.7 K/UL — HIGH (ref 3.8–10.5)
WBC # FLD AUTO: 11.7 K/UL — HIGH (ref 3.8–10.5)

## 2017-12-21 PROCEDURE — 99233 SBSQ HOSP IP/OBS HIGH 50: CPT

## 2017-12-21 PROCEDURE — 99232 SBSQ HOSP IP/OBS MODERATE 35: CPT

## 2017-12-21 RX ORDER — HUMAN INSULIN 100 [IU]/ML
4 INJECTION, SUSPENSION SUBCUTANEOUS
Qty: 0 | Refills: 0 | Status: DISCONTINUED | OUTPATIENT
Start: 2017-12-21 | End: 2018-01-07

## 2017-12-21 RX ORDER — HYDRALAZINE HCL 50 MG
5 TABLET ORAL ONCE
Qty: 0 | Refills: 0 | Status: COMPLETED | OUTPATIENT
Start: 2017-12-21 | End: 2017-12-22

## 2017-12-21 RX ORDER — SODIUM CHLORIDE 9 MG/ML
1000 INJECTION INTRAMUSCULAR; INTRAVENOUS; SUBCUTANEOUS
Qty: 0 | Refills: 0 | Status: DISCONTINUED | OUTPATIENT
Start: 2017-12-21 | End: 2017-12-21

## 2017-12-21 RX ORDER — SODIUM CHLORIDE 5 G/100ML
1000 INJECTION, SOLUTION INTRAVENOUS
Qty: 0 | Refills: 0 | Status: DISCONTINUED | OUTPATIENT
Start: 2017-12-21 | End: 2017-12-22

## 2017-12-21 RX ORDER — HUMAN INSULIN 100 [IU]/ML
6 INJECTION, SUSPENSION SUBCUTANEOUS
Qty: 0 | Refills: 0 | Status: DISCONTINUED | OUTPATIENT
Start: 2017-12-21 | End: 2017-12-21

## 2017-12-21 RX ADMIN — HUMAN INSULIN 4 UNIT(S): 100 INJECTION, SUSPENSION SUBCUTANEOUS at 18:28

## 2017-12-21 RX ADMIN — ENOXAPARIN SODIUM 40 MILLIGRAM(S): 100 INJECTION SUBCUTANEOUS at 18:28

## 2017-12-21 RX ADMIN — HUMAN INSULIN 4 UNIT(S): 100 INJECTION, SUSPENSION SUBCUTANEOUS at 13:54

## 2017-12-21 RX ADMIN — HUMAN INSULIN 4 UNIT(S): 100 INJECTION, SUSPENSION SUBCUTANEOUS at 09:04

## 2017-12-21 RX ADMIN — Medication 100 MILLIGRAM(S): at 14:06

## 2017-12-21 RX ADMIN — Medication 4: at 14:06

## 2017-12-21 RX ADMIN — Medication 2: at 22:31

## 2017-12-21 RX ADMIN — Medication 2: at 18:28

## 2017-12-21 RX ADMIN — SENNA PLUS 2 TABLET(S): 8.6 TABLET ORAL at 22:32

## 2017-12-21 RX ADMIN — HUMAN INSULIN 4 UNIT(S): 100 INJECTION, SUSPENSION SUBCUTANEOUS at 00:01

## 2017-12-21 RX ADMIN — LISINOPRIL 40 MILLIGRAM(S): 2.5 TABLET ORAL at 13:04

## 2017-12-21 RX ADMIN — HUMAN INSULIN 4 UNIT(S): 100 INJECTION, SUSPENSION SUBCUTANEOUS at 22:32

## 2017-12-21 RX ADMIN — AMLODIPINE BESYLATE 10 MILLIGRAM(S): 2.5 TABLET ORAL at 22:30

## 2017-12-21 NOTE — PROGRESS NOTE ADULT - ASSESSMENT
ASSESSMENT: This is a 57yman with one day of dizziness prior to admission. 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 Patient is neurologically improved. Patient is s/p cerebral angiogram today, to determine source of Vermian hemorrhage. Post- op diagnosis : Findings do not identify the presence of aneurysm or AVM.      NEURO: Continue close monitoring for neurologic deterioration in setting of cerebral edema with mass effect and brain compression. Slow titration to normotension as tolerated.  PMR for AR.     ANTITHROMBOTIC THERAPY:  No indication.     PULMONARY: Protecting airway, saturating well on room air.     CARDIOVASCULAR: Continue cardiac monitoring, slowly titrate anti-htn regimen accordingly.                               SBP goal: 140-160    GASTROINTESTINAL: Ulcer prophylaxis, dysphagia screen passed. Tolerating diet      Diet: Regular. Consistent Carb.    RENAL: BUN/Cr without acute change, maintain adequate hydration, good urine output      Na Goal: Greater than 135     Bray: n     HEMATOLOGY: H/H without acute change, no active bleeding, Platelets 265     DVT ppx: Heparin s.c [] LMWH [x]     ID: afebrile,  leukocytosis , monitor for si/sx of infection and encourage incentive spirometry , check UA/Cx.      DISPOSITION: AT rehab once stable and workup complete.      CORE MEASURES:        Admission NIHSS: No     TPA: [] YES [x] NO      LDL/HDL: NO     Depression Screen: pending     Statin Therapy: NO     Dysphagia Screen: [x] PASS [] FAIL     Smoking [] YES [x] NO      Afib [] YES [x] NO     Stroke Education [x] YES [] NO ASSESSMENT: This is a 57yman with one day of dizziness prior to admission. Patient presented to ED when the dizziness persisted and was found to have cerebellar hemorrhage and 4th ventricle IVH (Vermian Hemorrhage) for which he was transferred to [facility]. Patient is neurologically improved. Patient is s/p cerebral angiogram today, to determine source of Vermian hemorrhage. Post- op diagnosis : Findings do not identify the presence of aneurysm or AVM.      NEURO: Continue close monitoring for neurologic deterioration in setting of cerebral edema with mass effect and brain compression, start 2% Saline in setting of hyponatremia and mass effect. Slow titration to normotension as tolerated.  PMR for AR.     ANTITHROMBOTIC THERAPY:  No indication.     PULMONARY: Protecting airway, saturating well on room air.     CARDIOVASCULAR: Continue cardiac monitoring, slowly titrate anti-htn regimen accordingly.                               SBP goal: 140-160    GASTROINTESTINAL: Ulcer prophylaxis, dysphagia screen passed. Tolerating diet      Diet: Regular. Consistent Carb.    RENAL: BUN/Cr without acute change, maintain adequate hydration, good urine output, hyponatremic started on 2% in setting of mass effect, monitor bmp Q6      Na Goal: Greater than 140     Bray: n        HEMATOLOGY: H/H without acute change, no active bleeding, Platelets 265     DVT ppx: Heparin s.c [] LMWH [x]     ID: afebrile,  leukocytosis , monitor for si/sx of infection and encourage incentive spirometry , check UA/Cx.    Other:  Right groin with soreness, small area of ecchymosis but not hematoma at puncture site with 2+ DP pulses.  Upon closer exam, aubrie rash noted on thighs and encroaching the right groin and abdomen, dermatology called for consult.    DISPOSITION: [facility] rehab once stable and workup complete.      CORE MEASURES:        Admission NIHSS: No     TPA: [] YES [x] NO      LDL/HDL: NO     Depression Screen: pending     Statin Therapy: NO     Dysphagia Screen: [x] PASS [] FAIL     Smoking [] YES [x] NO      Afib [] YES [x] NO     Stroke Education [x] YES [] NO

## 2017-12-21 NOTE — PROGRESS NOTE ADULT - ASSESSMENT
57M with posterior fossa IPH. Now PBD#7  - No neurosurgical itnervention anticipated at this time  - Stroke w/u per neurology   - Because of space occupying hemorrhage in the posterior fossa, would recommend avoiding hyponatremia for risk of edema, try to keep Na~ 145 and consider 2% if needed.

## 2017-12-21 NOTE — CHART NOTE - NSCHARTNOTEFT_GEN_A_CORE
HPI: 56 yo M with PMH of HTN admitted with vermian hemorrhage.  Dermatology consulted for 1 day history of rash on lower legs and abdomen.  Patient reports it is slightly itchy, not painful.  Had IV contrast for angiogram yesterday.  Reportedly not started any new medications since admission.      PAST MEDICAL & SURGICAL HISTORY:  HTN (hypertension)  No significant past surgical history      REVIEW OF SYSTEMS      General: no fevers/chills, no lethargy	    Skin/Breast: see HPI  	  Ophthalmologic: no eye pain or change in vision  	  ENMT: no dysphagia or change in hearing    Respiratory and Thorax: no SOB or cough  	  Cardiovascular: no palpitations or chest pain    Gastrointestinal: no abdominal pain or blood in stool     Genitourinary: no dysuria or frequency    Musculoskeletal: no joint pains or weakness	    Neurological: no weakness, numbness , or tingling    MEDICATIONS  (STANDING):  amLODIPine   Tablet 10 milliGRAM(s) Oral daily  dextrose 5%. 1000 milliLiter(s) (50 mL/Hr) IV Continuous <Continuous>  dextrose 50% Injectable 12.5 Gram(s) IV Push once  dextrose 50% Injectable 25 Gram(s) IV Push once  dextrose 50% Injectable 25 Gram(s) IV Push once  docusate sodium 100 milliGRAM(s) Oral daily  enoxaparin Injectable 40 milliGRAM(s) SubCutaneous <User Schedule>  influenza   Vaccine 0.5 milliLiter(s) IntraMuscular once  insulin lispro (HumaLOG) corrective regimen sliding scale   SubCutaneous Before meals and at bedtime  insulin NPH human recombinant 4 Unit(s) SubCutaneous Before meals and at bedtime  lisinopril 40 milliGRAM(s) Oral daily  senna 2 Tablet(s) Oral at bedtime  sodium chloride 2% . 1000 milliLiter(s) (20 mL/Hr) IV Continuous <Continuous>    MEDICATIONS  (PRN):  acetaminophen   Tablet 650 milliGRAM(s) Oral every 6 hours PRN For Temp greater than 38 C (100.4 F)  acetaminophen   Tablet. 650 milliGRAM(s) Oral every 6 hours PRN Mild Pain (1 - 3)  dextrose Gel 1 Dose(s) Oral once PRN Blood Glucose LESS THAN 70 milliGRAM(s)/deciliter  glucagon  Injectable 1 milliGRAM(s) IntraMuscular once PRN Glucose LESS THAN 70 milligrams/deciliter  hydrALAZINE Injectable 5 milliGRAM(s) IV Push once PRN BP >160/90      Allergies    No Known Allergies    Intolerances        SOCIAL HISTORY:    FAMILY HISTORY:  No pertinent family history in first degree relatives      Vital Signs Last 24 Hrs  T(C): 36.7 (21 Dec 2017 04:00), Max: 36.8 (20 Dec 2017 20:00)  T(F): 98.1 (21 Dec 2017 04:00), Max: 98.3 (20 Dec 2017 20:00)  HR: 87 (21 Dec 2017 12:00) (66 - 97)  BP: 158/102 (21 Dec 2017 12:00) (104/66 - 175/106)  BP(mean): 116 (21 Dec 2017 12:00) (78 - 127)  RR: 21 (21 Dec 2017 12:00) (17 - 26)  SpO2: 95% (21 Dec 2017 12:00) (95% - 99%)    PHYSICAL EXAM:     The patient was alert and oriented X 3, well nourished, and in no  apparent distress.  OP showed no ulcerations  There was no visible lymphadenopathy.  Conjunctiva were non injected  There was no clubbing or edema of extremities.  The scalp, hair, face, eyebrows, lips, OP, neck, chest, back,   extremities X 4, nails were examined.  There was no hyperhidrosis or bromhidrosis.    Of note on skin exam: erythematous macules and patches scattered on bilateral thighs extending onto abdomen R>L, left dorsum of hand       LABS:                        17.4   11.7  )-----------( 265      ( 21 Dec 2017 05:54 )             52.1     12-21    137  |  101  |  22  ----------------------------<  126<H>  4.2   |  26  |  0.73    Ca    8.8      21 Dec 2017 09:51  Phos  2.9     12-19  Mg     2.3     12-19      Assessment & Plan: 56 yo M with macular exanthem on lower extremities in setting of recent IV contrast favor IV contrast allergic.  Start cetirizine 10mg qd up to tid as needed for rash/itch and benadryl qhs.  Plan d/w attending Dr. Shelly Falk.  Will officially round on patient tomorrow afternoon.      Nona Johnston MD (PGY-2)   Dermatology Resident  [facility]  Pager: [phone]  Office [phone] HPI: 58 yo M with PMH of HTN admitted with vermian hemorrhage.  Dermatology consulted for 1 day history of rash on lower legs and abdomen.  Patient reports it is slightly itchy, not painful.  Had IV contrast for angiogram yesterday.  Reportedly not started any new medications since admission.      PAST MEDICAL & SURGICAL HISTORY:  HTN (hypertension)  No significant past surgical history      REVIEW OF SYSTEMS      General: no fevers/chills, no lethargy	    Skin/Breast: see HPI  	  Ophthalmologic: no eye pain or change in vision  	  ENMT: no dysphagia or change in hearing    Respiratory and Thorax: no SOB or cough  	  Cardiovascular: no palpitations or chest pain    Gastrointestinal: no abdominal pain or blood in stool     Genitourinary: no dysuria or frequency    Musculoskeletal: no joint pains or weakness	    Neurological: no weakness, numbness , or tingling    MEDICATIONS  (STANDING):  amLODIPine   Tablet 10 milliGRAM(s) Oral daily  dextrose 5%. 1000 milliLiter(s) (50 mL/Hr) IV Continuous <Continuous>  dextrose 50% Injectable 12.5 Gram(s) IV Push once  dextrose 50% Injectable 25 Gram(s) IV Push once  dextrose 50% Injectable 25 Gram(s) IV Push once  docusate sodium 100 milliGRAM(s) Oral daily  enoxaparin Injectable 40 milliGRAM(s) SubCutaneous <User Schedule>  influenza   Vaccine 0.5 milliLiter(s) IntraMuscular once  insulin lispro (HumaLOG) corrective regimen sliding scale   SubCutaneous Before meals and at bedtime  insulin NPH human recombinant 4 Unit(s) SubCutaneous Before meals and at bedtime  lisinopril 40 milliGRAM(s) Oral daily  senna 2 Tablet(s) Oral at bedtime  sodium chloride 2% . 1000 milliLiter(s) (20 mL/Hr) IV Continuous <Continuous>    MEDICATIONS  (PRN):  acetaminophen   Tablet 650 milliGRAM(s) Oral every 6 hours PRN For Temp greater than 38 C (100.4 F)  acetaminophen   Tablet. 650 milliGRAM(s) Oral every 6 hours PRN Mild Pain (1 - 3)  dextrose Gel 1 Dose(s) Oral once PRN Blood Glucose LESS THAN 70 milliGRAM(s)/deciliter  glucagon  Injectable 1 milliGRAM(s) IntraMuscular once PRN Glucose LESS THAN 70 milligrams/deciliter  hydrALAZINE Injectable 5 milliGRAM(s) IV Push once PRN BP >160/90      Allergies    No Known Allergies    Intolerances        SOCIAL HISTORY:    FAMILY HISTORY:  No pertinent family history in first degree relatives      Vital Signs Last 24 Hrs  T(C): 36.7 (21 Dec 2017 04:00), Max: 36.8 (20 Dec 2017 20:00)  T(F): 98.1 (21 Dec 2017 04:00), Max: 98.3 (20 Dec 2017 20:00)  HR: 87 (21 Dec 2017 12:00) (66 - 97)  BP: 158/102 (21 Dec 2017 12:00) (104/66 - 175/106)  BP(mean): 116 (21 Dec 2017 12:00) (78 - 127)  RR: 21 (21 Dec 2017 12:00) (17 - 26)  SpO2: 95% (21 Dec 2017 12:00) (95% - 99%)    PHYSICAL EXAM:     The patient was alert and oriented X 3, well nourished, and in no  apparent distress.  OP showed no ulcerations  There was no visible lymphadenopathy.  Conjunctiva were non injected  There was no clubbing or edema of extremities.  The scalp, hair, face, eyebrows, lips, OP, neck, chest, back,   extremities X 4, nails were examined.  There was no hyperhidrosis or bromhidrosis.    Of note on skin exam: erythematous macules and patches scattered on bilateral thighs extending onto abdomen R>L, left dorsum of hand       LABS:                        17.4   11.7  )-----------( 265      ( 21 Dec 2017 05:54 )             52.1     12-21    137  |  101  |  22  ----------------------------<  126<H>  4.2   |  26  |  0.73    Ca    8.8      21 Dec 2017 09:51  Phos  2.9     12-19  Mg     2.3     12-19      Assessment & Plan: 58 yo M with macular exanthem on lower extremities in setting of recent IV contrast favor IV contrast allergic.  Start cetirizine 10mg qd up to tid as needed for rash/itch and benadryl qhs.  Plan d/w attending Dr. Shelly Falk.  Will officially round on patient tomorrow afternoon.      Nona Johnston MD (PGY-2)   Dermatology Resident  [facility]  Pager: [phone]  Office [phone]    Attending Addendum: Case discussed with Dr. Johnston as above.

## 2017-12-21 NOTE — PROGRESS NOTE ADULT - SUBJECTIVE AND OBJECTIVE BOX
THE PATIENT WAS SEEN AND EXAMINED BY ME WITH THE HOUSESTAFF AND STROKE TEAM DURING MORNING ROUNDS.   HPI:  57 year old male pmhx of htn coming in with 1 day of dizziness prior to admission. Patient presented to ED when the dizziness persisted and was found to have cerebellar hemorrhage and 4th ventricle IVH (Vermian Hemorrhage) for which he was transferred to [facility].   Patient stated he takes no medication and has no pmhx but reportedly has hx of htn.Denies generalized weakness, and nausea. No fall or recent trauma. No numbness, tingling, weakness. No antiplatelets or anticoagulants. Patient currently denies and headache, n/v, blurry vision, double vision, numbness or weakness.     SUBJECTIVE: No events overnight.  No new neurologic complaints.      acetaminophen   Tablet 650 milliGRAM(s) Oral every 6 hours PRN  acetaminophen   Tablet. 650 milliGRAM(s) Oral every 6 hours PRN  amLODIPine   Tablet 10 milliGRAM(s) Oral daily  dextrose 5%. 1000 milliLiter(s) IV Continuous <Continuous>  dextrose 50% Injectable 12.5 Gram(s) IV Push once  dextrose 50% Injectable 25 Gram(s) IV Push once  dextrose 50% Injectable 25 Gram(s) IV Push once  dextrose Gel 1 Dose(s) Oral once PRN  docusate sodium 100 milliGRAM(s) Oral daily  enoxaparin Injectable 40 milliGRAM(s) SubCutaneous <User Schedule>  glucagon  Injectable 1 milliGRAM(s) IntraMuscular once PRN  influenza   Vaccine 0.5 milliLiter(s) IntraMuscular once  insulin lispro (HumaLOG) corrective regimen sliding scale   SubCutaneous Before meals and at bedtime  insulin NPH human recombinant 4 Unit(s) SubCutaneous Before meals and at bedtime  lisinopril 40 milliGRAM(s) Oral daily  senna 2 Tablet(s) Oral at bedtime  sodium chloride 0.9%. 1000 milliLiter(s) IV Continuous <Continuous>      PHYSICAL EXAM:   Vital Signs Last 24 Hrs  T(C): 36.7 (21 Dec 2017 04:00), Max: 36.8 (20 Dec 2017 14:40)  T(F): 98.1 (21 Dec 2017 04:00), Max: 98.3 (20 Dec 2017 14:40)  HR: 74 (21 Dec 2017 06:00) (66 - 97)  BP: 148/78 (21 Dec 2017 06:00) (104/66 - 170/83)  BP(mean): 99 (21 Dec 2017 06:00) (78 - 108)  RR: 26 (21 Dec 2017 06:00) (17 - 26)  SpO2: 96% (21 Dec 2017 04:00) (95% - 100%)    General: Resting in bed. No acute distress  HEENT: PERRL,  EOM intact, visual fields full  Abdomen: Soft, nontender, nondistended   Extremities:  No edema.    NEUROLOGICAL EXAM:  Mental status: Awake, alert, oriented x3. Able to follow simple commands. Speech is fluent.   Cranial Nerves: No facial asymmetry, no nystagmus, no dysarthria, no dysphagia, tongue is midline.  Motor exam: Normal tone, no drift. Strength 5/5 throughout.  Sensation: Intact to light touch   Coordination/ Gait: Bilateral UE dysmetria, L>R.     LABS:                        17.4   11.7  )-----------( 265      ( 21 Dec 2017 05:54 )             52.1    12-21    137  |  101  |  22  ----------------------------<  126<H>  4.2   |  26  |  0.73    Ca    8.8      21 Dec 2017 09:51  Phos  2.9     12-19  Mg     2.3     12-19      Hemoglobin A1C, Whole Blood: 6.7 % (12-17 @ 06:40)      IMAGING: Reviewed by me.   EXAM:  CT BRAIN                        DATE:  12/19/2017      INTERPRETATION:  History: Follow-up parenchymal hemorrhage.  Multiple axial sections were performed from base of skull to vertex   without contrast enhancement.  This exam is compared with prior noncontrast head CT performed on   December 18, 2017.  Parenchymal hemorrhage and surrounding edema is again seen in the   posterior fossa midline. This area. Or hemorrhage measures approximately   3.6 x 3.0 cm and previously measured approximately 3.4 x 3.1 cm. Mass   effect on the fourth ventricles again seen and unchanged.  The size and configuration of the prominent lateral third ventricles are   again seen and unchanged.  Periventricular white matter lucency is again seen and unchanged.  Evaluation of the osseous structures with the appropriate window appears   unremarkable  The visualized paranasal sinuses mastoid and middle ear regions appear   clear  Impression: Stable exam when underlying for differences in technique..    CT Angio Neck w/ IV Cont (12.15.17)  NONCONTRAST CT BRAIN: Acute intra-axial hemorrhage in the cerebellar   vermis causing mass effect on the fourth ventricle. Mild prominence of   the third and lateral ventricles. Heterogeneous calcified mass in the   extracalvarial soft tissues of the right occipital bone.  CTA BRAIN: Patent intracranial circulation. No flow-limiting stenosis or   occlusion.   CTA NECK: Patent cervical vasculature. No flow limiting stenosis or   occlusion. THE PATIENT WAS SEEN AND EXAMINED BY ME WITH THE HOUSESTAFF AND STROKE TEAM DURING MORNING ROUNDS.   HPI:  57 year old male pmhx of htn coming in with 1 day of dizziness prior to admission. Patient presented to ED when the dizziness persisted and was found to have cerebellar hemorrhage and 4th ventricle IVH (Vermian Hemorrhage) for which he was transferred to [facility].   Patient stated he takes no medication and has no pmhx but reportedly has hx of htn.Denies generalized weakness, and nausea. No fall or recent trauma. No numbness, tingling, weakness. No antiplatelets or anticoagulants. Patient currently denies and headache, n/v, blurry vision, double vision, numbness or weakness.     SUBJECTIVE: No events overnight.  No new neurologic complaints.      acetaminophen   Tablet 650 milliGRAM(s) Oral every 6 hours PRN  acetaminophen   Tablet. 650 milliGRAM(s) Oral every 6 hours PRN  amLODIPine   Tablet 10 milliGRAM(s) Oral daily  dextrose 5%. 1000 milliLiter(s) IV Continuous <Continuous>  dextrose 50% Injectable 12.5 Gram(s) IV Push once  dextrose 50% Injectable 25 Gram(s) IV Push once  dextrose 50% Injectable 25 Gram(s) IV Push once  dextrose Gel 1 Dose(s) Oral once PRN  docusate sodium 100 milliGRAM(s) Oral daily  enoxaparin Injectable 40 milliGRAM(s) SubCutaneous <User Schedule>  glucagon  Injectable 1 milliGRAM(s) IntraMuscular once PRN  influenza   Vaccine 0.5 milliLiter(s) IntraMuscular once  insulin lispro (HumaLOG) corrective regimen sliding scale   SubCutaneous Before meals and at bedtime  insulin NPH human recombinant 4 Unit(s) SubCutaneous Before meals and at bedtime  lisinopril 40 milliGRAM(s) Oral daily  senna 2 Tablet(s) Oral at bedtime  sodium chloride 0.9%. 1000 milliLiter(s) IV Continuous <Continuous>      PHYSICAL EXAM:   Vital Signs Last 24 Hrs  T(C): 36.7 (21 Dec 2017 04:00), Max: 36.8 (20 Dec 2017 14:40)  T(F): 98.1 (21 Dec 2017 04:00), Max: 98.3 (20 Dec 2017 14:40)  HR: 74 (21 Dec 2017 06:00) (66 - 97)  BP: 148/78 (21 Dec 2017 06:00) (104/66 - 170/83)  BP(mean): 99 (21 Dec 2017 06:00) (78 - 108)  RR: 26 (21 Dec 2017 06:00) (17 - 26)  SpO2: 96% (21 Dec 2017 04:00) (95% - 100%)    General: Resting in bed. No acute distress  HEENT: PERRL,  EOM intact, visual fields full  Abdomen: Soft, nontender, nondistended   Extremities:  No edema.    NEUROLOGICAL EXAM:  Mental status: Awake, alert, oriented x3. Able to follow simple commands. Speech is fluent.   Cranial Nerves: No facial asymmetry, no nystagmus, no dysarthria, no dysphagia, tongue is midline.  Motor exam: Normal tone, no drift. Strength 5/5 throughout.  Sensation: Intact to light touch   Coordination/ Gait: Subtle LUE intentional tremor    LABS:                        17.4   11.7  )-----------( 265      ( 21 Dec 2017 05:54 )             52.1    12-21    137  |  101  |  22  ----------------------------<  126<H>  4.2   |  26  |  0.73    Ca    8.8      21 Dec 2017 09:51  Phos  2.9     12-19  Mg     2.3     12-19      Hemoglobin A1C, Whole Blood: 6.7 % (12-17 @ 06:40)      IMAGING: Reviewed by me.   EXAM:  CT BRAIN                        DATE:  12/19/2017      INTERPRETATION:  History: Follow-up parenchymal hemorrhage.  Multiple axial sections were performed from base of skull to vertex   without contrast enhancement.  This exam is compared with prior noncontrast head CT performed on   December 18, 2017.  Parenchymal hemorrhage and surrounding edema is again seen in the   posterior fossa midline. This area. Or hemorrhage measures approximately   3.6 x 3.0 cm and previously measured approximately 3.4 x 3.1 cm. Mass   effect on the fourth ventricles again seen and unchanged.  The size and configuration of the prominent lateral third ventricles are   again seen and unchanged.  Periventricular white matter lucency is again seen and unchanged.  Evaluation of the osseous structures with the appropriate window appears   unremarkable  The visualized paranasal sinuses mastoid and middle ear regions appear   clear  Impression: Stable exam when underlying for differences in technique..    CT Angio Neck w/ IV Cont (12.15.17)  NONCONTRAST CT BRAIN: Acute intra-axial hemorrhage in the cerebellar   vermis causing mass effect on the fourth ventricle. Mild prominence of   the third and lateral ventricles. Heterogeneous calcified mass in the   extracalvarial soft tissues of the right occipital bone.  CTA BRAIN: Patent intracranial circulation. No flow-limiting stenosis or   occlusion.   CTA NECK: Patent cervical vasculature. No flow limiting stenosis or   occlusion. THE PATIENT WAS SEEN AND EXAMINED BY ME WITH THE HOUSESTAFF AND STROKE TEAM DURING MORNING ROUNDS.   HPI:  57 year old male pmhx of htn coming in with 1 day of dizziness prior to admission. Patient presented to ED when the dizziness persisted and was found to have cerebellar hemorrhage and 4th ventricle IVH (Vermian Hemorrhage) for which he was transferred to [facility].   Patient stated he takes no medication and has no pmhx but reportedly has hx of htn.Denies generalized weakness, and nausea. No fall or recent trauma. No numbness, tingling, weakness. No antiplatelets or anticoagulants. Patient currently denies and headache, n/v, blurry vision, double vision, numbness or weakness.     SUBJECTIVE: No events overnight.  No new neurologic complaints.      acetaminophen   Tablet 650 milliGRAM(s) Oral every 6 hours PRN  acetaminophen   Tablet. 650 milliGRAM(s) Oral every 6 hours PRN  amLODIPine   Tablet 10 milliGRAM(s) Oral daily  dextrose 5%. 1000 milliLiter(s) IV Continuous <Continuous>  dextrose 50% Injectable 12.5 Gram(s) IV Push once  dextrose 50% Injectable 25 Gram(s) IV Push once  dextrose 50% Injectable 25 Gram(s) IV Push once  dextrose Gel 1 Dose(s) Oral once PRN  docusate sodium 100 milliGRAM(s) Oral daily  enoxaparin Injectable 40 milliGRAM(s) SubCutaneous <User Schedule>  glucagon  Injectable 1 milliGRAM(s) IntraMuscular once PRN  influenza   Vaccine 0.5 milliLiter(s) IntraMuscular once  insulin lispro (HumaLOG) corrective regimen sliding scale   SubCutaneous Before meals and at bedtime  insulin NPH human recombinant 4 Unit(s) SubCutaneous Before meals and at bedtime  lisinopril 40 milliGRAM(s) Oral daily  senna 2 Tablet(s) Oral at bedtime  sodium chloride 0.9%. 1000 milliLiter(s) IV Continuous <Continuous>      PHYSICAL EXAM:   Vital Signs Last 24 Hrs  T(C): 36.7 (21 Dec 2017 04:00), Max: 36.8 (20 Dec 2017 14:40)  T(F): 98.1 (21 Dec 2017 04:00), Max: 98.3 (20 Dec 2017 14:40)  HR: 74 (21 Dec 2017 06:00) (66 - 97)  BP: 148/78 (21 Dec 2017 06:00) (104/66 - 170/83)  BP(mean): 99 (21 Dec 2017 06:00) (78 - 108)  RR: 26 (21 Dec 2017 06:00) (17 - 26)  SpO2: 96% (21 Dec 2017 04:00) (95% - 100%)    General: Resting in bed. No acute distress  HEENT: PERRL,  EOM intact, visual fields full  Abdomen: Soft, nontender, nondistended   Extremities:  No edema.    NEUROLOGICAL EXAM:  Mental status: Awake, alert, oriented x3. Able to follow simple commands. Speech is fluent.   Cranial Nerves: No facial asymmetry, no nystagmus, no dysarthria, no dysphagia, tongue is midline.  Motor exam: Normal tone, no drift. Strength 5/5 throughout.  Sensation: Intact to light touch   Coordination/ Gait: Subtle LUE intention tremor    LABS:                        17.4   11.7  )-----------( 265      ( 21 Dec 2017 05:54 )             52.1    12-21    137  |  101  |  22  ----------------------------<  126<H>  4.2   |  26  |  0.73    Ca    8.8      21 Dec 2017 09:51  Phos  2.9     12-19  Mg     2.3     12-19      Hemoglobin A1C, Whole Blood: 6.7 % (12-17 @ 06:40)      IMAGING: Reviewed by me.   EXAM:  CT BRAIN                        DATE:  12/19/2017      INTERPRETATION:  History: Follow-up parenchymal hemorrhage.  Multiple axial sections were performed from base of skull to vertex   without contrast enhancement.  This exam is compared with prior noncontrast head CT performed on   December 18, 2017.  Parenchymal hemorrhage and surrounding edema is again seen in the   posterior fossa midline. This area. Or hemorrhage measures approximately   3.6 x 3.0 cm and previously measured approximately 3.4 x 3.1 cm. Mass   effect on the fourth ventricles again seen and unchanged.  The size and configuration of the prominent lateral third ventricles are   again seen and unchanged.  Periventricular white matter lucency is again seen and unchanged.  Evaluation of the osseous structures with the appropriate window appears   unremarkable  The visualized paranasal sinuses mastoid and middle ear regions appear   clear  Impression: Stable exam when underlying for differences in technique..    CT Angio Neck w/ IV Cont (12.15.17)  NONCONTRAST CT BRAIN: Acute intra-axial hemorrhage in the cerebellar   vermis causing mass effect on the fourth ventricle. Mild prominence of   the third and lateral ventricles. Heterogeneous calcified mass in the   extracalvarial soft tissues of the right occipital bone.  CTA BRAIN: Patent intracranial circulation. No flow-limiting stenosis or   occlusion.   CTA NECK: Patent cervical vasculature. No flow limiting stenosis or   occlusion.

## 2017-12-21 NOTE — PROGRESS NOTE ADULT - SUBJECTIVE AND OBJECTIVE BOX
Patient seen and examined at bedside.    T(C): 36.7 (12-21-17 @ 04:00), Max: 37.1 (12-20-17 @ 11:00)  HR: 74 (12-21-17 @ 06:00) (65 - 97)  BP: 148/78 (12-21-17 @ 06:00) (104/66 - 170/83)  RR: 26 (12-21-17 @ 06:00) (17 - 26)  SpO2: 96% (12-21-17 @ 04:00) (95% - 100%)  Wt(kg): --    Exam:    Awake, alert, AOX3  PERRL, EOMI, face equal, tongue m/l  BOSTON 5/5 , no drift   SILT

## 2017-12-22 DIAGNOSIS — L30.9 DERMATITIS, UNSPECIFIED: ICD-10-CM

## 2017-12-22 DIAGNOSIS — R21 RASH AND OTHER NONSPECIFIC SKIN ERUPTION: ICD-10-CM

## 2017-12-22 LAB
ANION GAP SERPL CALC-SCNC: 11 MMOL/L — SIGNIFICANT CHANGE UP (ref 5–17)
ANION GAP SERPL CALC-SCNC: 11 MMOL/L — SIGNIFICANT CHANGE UP (ref 5–17)
ANION GAP SERPL CALC-SCNC: 12 MMOL/L — SIGNIFICANT CHANGE UP (ref 5–17)
ANION GAP SERPL CALC-SCNC: 16 MMOL/L — SIGNIFICANT CHANGE UP (ref 5–17)
APPEARANCE UR: CLEAR — SIGNIFICANT CHANGE UP
BILIRUB UR-MCNC: NEGATIVE — SIGNIFICANT CHANGE UP
BUN SERPL-MCNC: 19 MG/DL — SIGNIFICANT CHANGE UP (ref 7–23)
BUN SERPL-MCNC: 19 MG/DL — SIGNIFICANT CHANGE UP (ref 7–23)
BUN SERPL-MCNC: 21 MG/DL — SIGNIFICANT CHANGE UP (ref 7–23)
BUN SERPL-MCNC: 24 MG/DL — HIGH (ref 7–23)
CALCIUM SERPL-MCNC: 8.2 MG/DL — LOW (ref 8.4–10.5)
CALCIUM SERPL-MCNC: 8.4 MG/DL — SIGNIFICANT CHANGE UP (ref 8.4–10.5)
CALCIUM SERPL-MCNC: 8.6 MG/DL — SIGNIFICANT CHANGE UP (ref 8.4–10.5)
CALCIUM SERPL-MCNC: 8.8 MG/DL — SIGNIFICANT CHANGE UP (ref 8.4–10.5)
CHLORIDE SERPL-SCNC: 104 MMOL/L — SIGNIFICANT CHANGE UP (ref 96–108)
CO2 SERPL-SCNC: 17 MMOL/L — LOW (ref 22–31)
CO2 SERPL-SCNC: 21 MMOL/L — LOW (ref 22–31)
CO2 SERPL-SCNC: 22 MMOL/L — SIGNIFICANT CHANGE UP (ref 22–31)
CO2 SERPL-SCNC: 24 MMOL/L — SIGNIFICANT CHANGE UP (ref 22–31)
COLOR SPEC: YELLOW — SIGNIFICANT CHANGE UP
COMMENT - URINE: SIGNIFICANT CHANGE UP
CREAT SERPL-MCNC: 0.59 MG/DL — SIGNIFICANT CHANGE UP (ref 0.5–1.3)
CREAT SERPL-MCNC: 0.59 MG/DL — SIGNIFICANT CHANGE UP (ref 0.5–1.3)
CREAT SERPL-MCNC: 0.63 MG/DL — SIGNIFICANT CHANGE UP (ref 0.5–1.3)
CREAT SERPL-MCNC: 0.7 MG/DL — SIGNIFICANT CHANGE UP (ref 0.5–1.3)
DIFF PNL FLD: NEGATIVE — SIGNIFICANT CHANGE UP
EPI CELLS # UR: SIGNIFICANT CHANGE UP /HPF
GLUCOSE BLDC GLUCOMTR-MCNC: 116 MG/DL — HIGH (ref 70–99)
GLUCOSE BLDC GLUCOMTR-MCNC: 132 MG/DL — HIGH (ref 70–99)
GLUCOSE BLDC GLUCOMTR-MCNC: 134 MG/DL — HIGH (ref 70–99)
GLUCOSE BLDC GLUCOMTR-MCNC: 145 MG/DL — HIGH (ref 70–99)
GLUCOSE SERPL-MCNC: 123 MG/DL — HIGH (ref 70–99)
GLUCOSE SERPL-MCNC: 154 MG/DL — HIGH (ref 70–99)
GLUCOSE SERPL-MCNC: 156 MG/DL — HIGH (ref 70–99)
GLUCOSE SERPL-MCNC: 97 MG/DL — SIGNIFICANT CHANGE UP (ref 70–99)
GLUCOSE UR QL: 300
HCT VFR BLD CALC: 51.8 % — HIGH (ref 39–50)
HGB BLD-MCNC: 17.1 G/DL — HIGH (ref 13–17)
KETONES UR-MCNC: NEGATIVE — SIGNIFICANT CHANGE UP
LEUKOCYTE ESTERASE UR-ACNC: NEGATIVE — SIGNIFICANT CHANGE UP
MCHC RBC-ENTMCNC: 31.3 PG — SIGNIFICANT CHANGE UP (ref 27–34)
MCHC RBC-ENTMCNC: 33.1 GM/DL — SIGNIFICANT CHANGE UP (ref 32–36)
MCV RBC AUTO: 94.6 FL — SIGNIFICANT CHANGE UP (ref 80–100)
NITRITE UR-MCNC: NEGATIVE — SIGNIFICANT CHANGE UP
PH UR: 6 — SIGNIFICANT CHANGE UP (ref 5–8)
PLATELET # BLD AUTO: 128 K/UL — LOW (ref 150–400)
POTASSIUM SERPL-MCNC: 3.6 MMOL/L — SIGNIFICANT CHANGE UP (ref 3.5–5.3)
POTASSIUM SERPL-MCNC: 3.9 MMOL/L — SIGNIFICANT CHANGE UP (ref 3.5–5.3)
POTASSIUM SERPL-MCNC: 4.2 MMOL/L — SIGNIFICANT CHANGE UP (ref 3.5–5.3)
POTASSIUM SERPL-MCNC: 4.6 MMOL/L — SIGNIFICANT CHANGE UP (ref 3.5–5.3)
POTASSIUM SERPL-SCNC: 3.6 MMOL/L — SIGNIFICANT CHANGE UP (ref 3.5–5.3)
POTASSIUM SERPL-SCNC: 3.9 MMOL/L — SIGNIFICANT CHANGE UP (ref 3.5–5.3)
POTASSIUM SERPL-SCNC: 4.2 MMOL/L — SIGNIFICANT CHANGE UP (ref 3.5–5.3)
POTASSIUM SERPL-SCNC: 4.6 MMOL/L — SIGNIFICANT CHANGE UP (ref 3.5–5.3)
PROT UR-MCNC: 30 MG/DL
RBC # BLD: 5.47 M/UL — SIGNIFICANT CHANGE UP (ref 4.2–5.8)
RBC # FLD: 13.7 % — SIGNIFICANT CHANGE UP (ref 10.3–14.5)
RBC CASTS # UR COMP ASSIST: SIGNIFICANT CHANGE UP /HPF (ref 0–2)
SODIUM SERPL-SCNC: 136 MMOL/L — SIGNIFICANT CHANGE UP (ref 135–145)
SODIUM SERPL-SCNC: 137 MMOL/L — SIGNIFICANT CHANGE UP (ref 135–145)
SODIUM SERPL-SCNC: 137 MMOL/L — SIGNIFICANT CHANGE UP (ref 135–145)
SODIUM SERPL-SCNC: 140 MMOL/L — SIGNIFICANT CHANGE UP (ref 135–145)
SP GR SPEC: 1.02 — SIGNIFICANT CHANGE UP (ref 1.01–1.02)
UROBILINOGEN FLD QL: 1
WBC # BLD: 9.9 K/UL — SIGNIFICANT CHANGE UP (ref 3.8–10.5)
WBC # FLD AUTO: 9.9 K/UL — SIGNIFICANT CHANGE UP (ref 3.8–10.5)
WBC UR QL: SIGNIFICANT CHANGE UP /HPF (ref 0–5)

## 2017-12-22 PROCEDURE — 99232 SBSQ HOSP IP/OBS MODERATE 35: CPT

## 2017-12-22 PROCEDURE — 99233 SBSQ HOSP IP/OBS HIGH 50: CPT

## 2017-12-22 PROCEDURE — 70551 MRI BRAIN STEM W/O DYE: CPT | Mod: 26

## 2017-12-22 PROCEDURE — 99223 1ST HOSP IP/OBS HIGH 75: CPT | Mod: GC

## 2017-12-22 RX ORDER — DIPHENHYDRAMINE HCL 50 MG
25 CAPSULE ORAL AT BEDTIME
Qty: 0 | Refills: 0 | Status: DISCONTINUED | OUTPATIENT
Start: 2017-12-22 | End: 2017-12-27

## 2017-12-22 RX ORDER — DIPHENHYDRAMINE HCL 50 MG
25 CAPSULE ORAL ONCE
Qty: 0 | Refills: 0 | Status: COMPLETED | OUTPATIENT
Start: 2017-12-22 | End: 2017-12-22

## 2017-12-22 RX ORDER — SODIUM CHLORIDE 5 G/100ML
1000 INJECTION, SOLUTION INTRAVENOUS
Qty: 0 | Refills: 0 | Status: DISCONTINUED | OUTPATIENT
Start: 2017-12-22 | End: 2017-12-24

## 2017-12-22 RX ORDER — SODIUM CHLORIDE 5 G/100ML
1000 INJECTION, SOLUTION INTRAVENOUS
Qty: 0 | Refills: 0 | Status: DISCONTINUED | OUTPATIENT
Start: 2017-12-22 | End: 2017-12-22

## 2017-12-22 RX ORDER — LORATADINE 10 MG/1
10 TABLET ORAL DAILY
Qty: 0 | Refills: 0 | Status: DISCONTINUED | OUTPATIENT
Start: 2017-12-22 | End: 2017-12-22

## 2017-12-22 RX ORDER — LORATADINE 10 MG/1
10 TABLET ORAL
Qty: 0 | Refills: 0 | Status: DISCONTINUED | OUTPATIENT
Start: 2017-12-22 | End: 2017-12-27

## 2017-12-22 RX ADMIN — Medication 25 MILLIGRAM(S): at 01:29

## 2017-12-22 RX ADMIN — Medication 650 MILLIGRAM(S): at 10:41

## 2017-12-22 RX ADMIN — Medication 25 MILLIGRAM(S): at 22:37

## 2017-12-22 RX ADMIN — HUMAN INSULIN 4 UNIT(S): 100 INJECTION, SUSPENSION SUBCUTANEOUS at 09:28

## 2017-12-22 RX ADMIN — Medication 5 MILLIGRAM(S): at 12:18

## 2017-12-22 RX ADMIN — HUMAN INSULIN 4 UNIT(S): 100 INJECTION, SUSPENSION SUBCUTANEOUS at 17:25

## 2017-12-22 RX ADMIN — HUMAN INSULIN 4 UNIT(S): 100 INJECTION, SUSPENSION SUBCUTANEOUS at 22:38

## 2017-12-22 RX ADMIN — Medication 650 MILLIGRAM(S): at 11:11

## 2017-12-22 RX ADMIN — LISINOPRIL 40 MILLIGRAM(S): 2.5 TABLET ORAL at 11:35

## 2017-12-22 RX ADMIN — LORATADINE 10 MILLIGRAM(S): 10 TABLET ORAL at 11:35

## 2017-12-22 RX ADMIN — HUMAN INSULIN 4 UNIT(S): 100 INJECTION, SUSPENSION SUBCUTANEOUS at 13:07

## 2017-12-22 RX ADMIN — SENNA PLUS 2 TABLET(S): 8.6 TABLET ORAL at 22:37

## 2017-12-22 RX ADMIN — LORATADINE 10 MILLIGRAM(S): 10 TABLET ORAL at 22:37

## 2017-12-22 RX ADMIN — Medication 100 MILLIGRAM(S): at 11:36

## 2017-12-22 RX ADMIN — ENOXAPARIN SODIUM 40 MILLIGRAM(S): 100 INJECTION SUBCUTANEOUS at 17:26

## 2017-12-22 RX ADMIN — SODIUM CHLORIDE 30 MILLILITER(S): 5 INJECTION, SOLUTION INTRAVENOUS at 09:30

## 2017-12-22 RX ADMIN — AMLODIPINE BESYLATE 10 MILLIGRAM(S): 2.5 TABLET ORAL at 22:37

## 2017-12-22 NOTE — CONSULT NOTE ADULT - PROBLEM SELECTOR RECOMMENDATION 2
-Start triamcinolone 0.1% ointment to affected areas bid    -Start liberal use of emollient such as Aquaphor or Vaseline tid-qid

## 2017-12-22 NOTE — CONSULT NOTE ADULT - ASSESSMENT
56 yo M with macular exanthem on lower extremities in setting of recent IV contrast favor IV contrast allergic reaction. 58 yo M with macular exanthem on lower extremities in setting of recent IV contrast favor IV contrast allergic reaction.  Also with likely eczematous dermatitis of the L dorsal hand

## 2017-12-22 NOTE — PROGRESS NOTE ADULT - ASSESSMENT
ASSESSMENT: This is a 57yman with one day of dizziness prior to admission. Patient presented to ED when the dizziness persisted and was found to have cerebellar hemorrhage and 4th ventricle IVH (Vermian Hemorrhage) for which he was transferred to [facility]. Patient is neurologically improved. Patient is s/p cerebral angiogram today, to determine source of Vermian hemorrhage. Post- op diagnosis : Findings do not identify the presence of aneurysm or AVM.      NEURO: Continue close monitoring for neurologic deterioration in setting of cerebral edema with mass effect and brain compression, start 2% Saline in setting of hyponatremia and mass effect. Slow titration to normotension as tolerated.  PMR for AR.     ANTITHROMBOTIC THERAPY:  No indication.     PULMONARY: Protecting airway, saturating well on room air.     CARDIOVASCULAR: Continue cardiac monitoring, slowly titrate anti-htn regimen accordingly.                               SBP goal: 140-160    GASTROINTESTINAL: Ulcer prophylaxis, dysphagia screen passed. Tolerating diet      Diet: Regular. Consistent Carb.    RENAL: BUN/Cr without acute change, maintain adequate hydration, good urine output, hyponatremic started on 2% in setting of mass effect, monitor bmp Q6      Na Goal: Greater than 140     Bray: n        HEMATOLOGY: H/H without acute change, no active bleeding, Platelets 265     DVT ppx: Heparin s.c [] LMWH [x]     ID: afebrile,  leukocytosis , monitor for si/sx of infection and encourage incentive spirometry , check UA/Cx.    Other:  Right groin with soreness, small area of ecchymosis but not hematoma at puncture site with 2+ DP pulses.  Upon closer exam, aubrie rash noted on thighs and encroaching the right groin and abdomen, dermatology called for consult.  Appreciate derm consult started on Cetirizine and benadryl, most likely allergic response to contrast.    DISPOSITION: [facility] rehab once stable and workup complete.      CORE MEASURES:        Admission NIHSS: No     TPA: [] YES [x] NO      LDL/HDL: NO     Depression Screen: pending     Statin Therapy: NO     Dysphagia Screen: [x] PASS [] FAIL     Smoking [] YES [x] NO      Afib [] YES [x] NO     Stroke Education [x] YES [] NO ASSESSMENT: This is a 57yman with one day of dizziness prior to admission. Patient presented to ED when the dizziness persisted and was found to have cerebellar hemorrhage and 4th ventricle IVH (Vermian Hemorrhage) for which he was transferred to [facility]. Patient is neurologically improved. Patient is s/p cerebral angiogram today, to determine source of Vermian hemorrhage. Post- op diagnosis : Findings do not identify the presence of aneurysm or AVM.      NEURO: Neurologically improved vs admision, continue close monitoring for neurologic deterioration in setting of cerebral edema with mass effect and brain compression, start 2% Saline in setting of hyponatremia and mass effect, goal of . Slow titration to normotension as tolerated.  PMR for AR.     ANTITHROMBOTIC THERAPY:  No indication.     PULMONARY: Protecting airway, saturating well on room air.     CARDIOVASCULAR: Continue cardiac monitoring, slowly titrate anti-htn regimen accordingly.                               SBP goal: 140-160    GASTROINTESTINAL: Ulcer prophylaxis, dysphagia screen passed. Tolerating diet      Diet: Regular. Consistent Carb.    RENAL: BUN/Cr without acute change, maintain adequate hydration, good urine output, hyponatremic started on 2% in setting of mass effect, monitor bmp Q6      Na Goal: Greater than 140     Bray: n        HEMATOLOGY: H/H without acute change, no active bleeding, Platelets 128     DVT ppx: Heparin s.c [] LMWH [x]     ID: afebrile,  leukocytosis , monitor for si/sx of infection and encourage incentive spirometry , check UA/Cx.    Other:  small area of ecchymosis but not hematoma at puncture site with 2+ DP pulses.  Upon closer exam, aubrie rash noted on thighs and encroaching the right groin and abdomen, dermatology called for consult.  Appreciate derm consult started on Cetirizine and benadryl, most likely allergic response to contrast.    DISPOSITION: [facility] once stable and workup complete.      CORE MEASURES:        Admission NIHSS: No     TPA: [] YES [x] NO      LDL/HDL: NO     Depression Screen: pending     Statin Therapy: NO     Dysphagia Screen: [x] PASS [] FAIL     Smoking [] YES [x] NO      Afib [] YES [x] NO     Stroke Education [x] YES [] NO ASSESSMENT: This is a 57yman with one day of dizziness prior to admission. Patient presented to ED when the dizziness persisted and was found to have cerebellar hemorrhage and 4th ventricle IVH (Vermian Hemorrhage) for which he was transferred to [facility]. MRI shows multiple areas of micro-hemorrhage involving right frontal motor strip, right thalamic and left paramedian modesto, and right dorsal modesto, area of restricted diffusion on left parietal/occipital and left frontal territory    NEURO: Neurologically improved vs admission, s/p cerebral angiography 12/20: demonstrated Normal pial and dural surfaces. No aneurysms. No venous occlusions, continue close monitoring for neurologic deterioration in setting of cerebral edema with mass effect and brain compression, continue 2% Saline in setting of hyponatremia and mass effect, goal of . as per neurosurgery eval: no acute intervention needed at this time, Slow titration to normotension as tolerated. Plan to obtain repeat neuroimaging in 4-6 weeks to r./o underlying pathology.  PMR for AR.     ANTITHROMBOTIC THERAPY:  No indication.     PULMONARY: Protecting airway, saturating well on room air.     CARDIOVASCULAR: TTE: unremarkable, Continue cardiac monitoring, slowly titrate anti-htn regimen accordingly. Will start lopressor 12.5 mg for BP management  BID                             SBP goal: 140-160    GASTROINTESTINAL: dysphagia screen passed. Tolerating diet      Diet: Regular. Consistent Carb.    RENAL: BUN/Cr without acute change, maintain adequate hydration, good urine output, hyponatremic started on 2% in setting of mass effect, monitor bmp Q6      Na Goal: Greater than 140     Bray: n        HEMATOLOGY: H/H  Platelets without acute change, no active bleeding,     DVT ppx: LMWH      ID: afebrile,  No leukocytosis , monitor for si/sx of infection and encourage incentive spirometry    Other:  small area of ecchymosis but not hematoma at puncture site with 2+ DP pulses.  Upon closer exam, aubrie rash noted on thighs and encroaching the right groin and abdomen, started on Cetirizine and benadryl, most likely allergic response to contrast.  Appreciate derm consult     DISPOSITION: [facility] once stable and workup complete.      CORE MEASURES:        Admission NIHSS: No     TPA: [] YES [x] NO      LDL/HDL: NO     Depression Screen: pending     Statin Therapy: NO     Dysphagia Screen: [x] PASS [] FAIL     Smoking [] YES [x] NO      Afib [] YES [x] NO     Stroke Education [x] YES [] NO ASSESSMENT: This is a 57yman with one day of dizziness prior to admission. Patient presented to ED when the dizziness persisted and was found to have cerebellar hemorrhage and 4th ventricle IVH (Vermian Hemorrhage) for which he was transferred to [facility]. MRI shows multiple areas of micro-hemorrhage involving right frontal motor strip, right thalamic and left paramedian modesto, and right dorsal modesto, area of restricted diffusion on left parietal/occipital and left frontal territory    NEURO: Neurologically improved vs admission, s/p cerebral angiography 12/20: demonstrated Normal pial and dural surfaces. No aneurysms. No venous occlusions, continue close monitoring for neurologic deterioration in setting of cerebral edema with mass effect and brain compression, continue 2% Saline in setting of hyponatremia and mass effect, goal of . as per neurosurgery eval: no acute intervention needed at this time, Slow titration to normotension as tolerated. Plan to obtain repeat neuroimaging in 4-6 weeks to r./o underlying pathology. PMR for AR.     ANTITHROMBOTIC THERAPY:  No indication.     PULMONARY: Protecting airway, saturating well on room air.     CARDIOVASCULAR: TTE: unremarkable, Continue cardiac monitoring, slowly titrate anti-htn regimen accordingly. Will start lopressor 12.5 mg for BP management  BID                             SBP goal: 140-160    GASTROINTESTINAL: dysphagia screen passed. Tolerating diet      Diet: Regular. Consistent Carb.    RENAL: BUN/Cr without acute change, maintain adequate hydration, good urine output, hyponatremic started on 2% in setting of mass effect, monitor bmp Q6      Na Goal: Greater than 140     Bray: n        HEMATOLOGY: H/H  Platelets without acute change, no active bleeding,     DVT ppx: LMWH      ID: afebrile,  No leukocytosis , monitor for si/sx of infection and encourage incentive spirometry    Other:  small area of ecchymosis but not hematoma at puncture site with 2+ DP pulses.  Upon closer exam, aubrie rash noted on thighs and encroaching the right groin and abdomen, started on Cetirizine and benadryl, most likely allergic response to contrast.  Appreciate derm consult     DISPOSITION: [facility] once stable and workup complete.      CORE MEASURES:        Admission NIHSS: No     TPA: [] YES [x] NO      LDL/HDL: NO     Depression Screen: pending     Statin Therapy: NO     Dysphagia Screen: [x] PASS [] FAIL     Smoking [] YES [x] NO      Afib [] YES [x] NO     Stroke Education [x] YES [] NO ASSESSMENT: This is a 57yman with one day of dizziness prior to admission. Patient presented to ED when the dizziness persisted and was found to have cerebellar hemorrhage and 4th ventricle IVH (Vermian Hemorrhage) for which he was transferred to [facility]. MRI shows multiple areas of micro-hemorrhage involving right frontal motor strip, right thalamic and left paramedian modesto, and right dorsal modseto, areas of restricted diffusion on left parietal/occipital and left frontal territory. Impression diagnosis: Non-traumatic vermian hemorrhage with IVH with multiple areas susceptibility at SWI compatible with hypertensive microangiopathy. Areas of restricted diffusion at Brain MRI with no clinical significance and in the setting of angiography commonly seen post-procedure. Angiography done 12/20/17 with no evidence of dural or pial AVM, or aneurysms.     NEURO: Neurologically improved vs admission, s/p cerebral angiography 12/20: demonstrated Normal pial and dural surfaces. No aneurysms. No venous occlusions, continue close monitoring for neurologic deterioration in setting of cerebral edema with mass effect and brain compression, continue 2% Saline in setting of hyponatremia and mass effect, goal of -155. as per neurosurgery eval: no acute intervention needed at this time, BP goal <140/90. LDL 93 no role of statin therapy in this setting. Plan to obtain repeat neuroimaging in 4-6 weeks to r/o underlying pathology. PMR for AR.     ANTITHROMBOTIC THERAPY:  No indication.     PULMONARY: Protecting airway, saturating well on room air.     CARDIOVASCULAR: TTE: unremarkable, Continue cardiac monitoring, slowly titrate anti-htn regimen accordingly. Will start lopressor 12.5 mg for BP management  BID                             SBP goal: 140-160    GASTROINTESTINAL: dysphagia screen passed. Tolerating diet      Diet: Regular. Consistent Carb.    RENAL: BUN/Cr without acute change, maintain adequate hydration, good urine output, hyponatremic started on 2% in setting of mass effect, monitor bmp Q6      Na Goal: Greater than 140     Bray: n        HEMATOLOGY: H/H  Platelets without acute change, no active bleeding,     DVT ppx: LMWH      ID: afebrile,  No leukocytosis , monitor for si/sx of infection and encourage incentive spirometry    Other:  small area of ecchymosis but not hematoma at puncture site with 2+ DP pulses.  Upon closer exam, aubrie rash noted on thighs and encroaching the right groin and abdomen, started on Cetirizine and benadryl, most likely allergic response to contrast.  Appreciate derm consult     DISPOSITION: [facility] re[facility] once stable and workup complete.      CORE MEASURES:        Admission NIHSS: No     TPA: [] YES [x] NO      LDL/HDL: NO     Depression Screen: pending     Statin Therapy: NO     Dysphagia Screen: [x] PASS [] FAIL     Smoking [] YES [x] NO      Afib [] YES [x] NO     Stroke Education [x] YES [] NO

## 2017-12-22 NOTE — CONSULT NOTE ADULT - PROBLEM SELECTOR RECOMMENDATION 9
-Increase cetirizine 10mg from qd to tid as needed for rash/itch and benadryl qhs  -Start triamcinolone 0.1% ointment to affected areas bid

## 2017-12-22 NOTE — CONSULT NOTE ADULT - SUBJECTIVE AND OBJECTIVE BOX
HPI: 58 yo M with PMH of HTN admitted with vermian hemorrhage.  Dermatology consulted for 1 day history of rash on lower legs and abdomen.  Patient reports it is slightly itchy, not painful.  Had IV contrast for angiogram yesterday.  Reportedly not started any new medications since admission.      PAST MEDICAL & SURGICAL HISTORY:  HTN (hypertension)  No significant past surgical history      REVIEW OF SYSTEMS      General: no fevers/chills, no lethargy	    Skin/Breast: see HPI  	  Ophthalmologic: no eye pain or change in vision  	  ENMT: no dysphagia or change in hearing    Respiratory and Thorax: no SOB or cough  	  Cardiovascular: no palpitations or chest pain    Gastrointestinal: no abdominal pain or blood in stool     Genitourinary: no dysuria or frequency    Musculoskeletal: no joint pains or weakness	    Neurological: no weakness, numbness , or tingling    MEDICATIONS  (STANDING):  amLODIPine   Tablet 10 milliGRAM(s) Oral daily  dextrose 5%. 1000 milliLiter(s) (50 mL/Hr) IV Continuous <Continuous>  dextrose 50% Injectable 12.5 Gram(s) IV Push once  dextrose 50% Injectable 25 Gram(s) IV Push once  dextrose 50% Injectable 25 Gram(s) IV Push once  docusate sodium 100 milliGRAM(s) Oral daily  enoxaparin Injectable 40 milliGRAM(s) SubCutaneous <User Schedule>  influenza   Vaccine 0.5 milliLiter(s) IntraMuscular once  insulin lispro (HumaLOG) corrective regimen sliding scale   SubCutaneous Before meals and at bedtime  insulin NPH human recombinant 4 Unit(s) SubCutaneous Before meals and at bedtime  lisinopril 40 milliGRAM(s) Oral daily  senna 2 Tablet(s) Oral at bedtime  sodium chloride 2% . 1000 milliLiter(s) (20 mL/Hr) IV Continuous <Continuous>    MEDICATIONS  (PRN):  acetaminophen   Tablet 650 milliGRAM(s) Oral every 6 hours PRN For Temp greater than 38 C (100.4 F)  acetaminophen   Tablet. 650 milliGRAM(s) Oral every 6 hours PRN Mild Pain (1 - 3)  dextrose Gel 1 Dose(s) Oral once PRN Blood Glucose LESS THAN 70 milliGRAM(s)/deciliter  glucagon  Injectable 1 milliGRAM(s) IntraMuscular once PRN Glucose LESS THAN 70 milligrams/deciliter  hydrALAZINE Injectable 5 milliGRAM(s) IV Push once PRN BP >160/90      Allergies    No Known Allergies    Intolerances        SOCIAL HISTORY:    FAMILY HISTORY:  No pertinent family history in first degree relatives      Vital Signs Last 24 Hrs  T(C): 36.7 (21 Dec 2017 04:00), Max: 36.8 (20 Dec 2017 20:00)  T(F): 98.1 (21 Dec 2017 04:00), Max: 98.3 (20 Dec 2017 20:00)  HR: 87 (21 Dec 2017 12:00) (66 - 97)  BP: 158/102 (21 Dec 2017 12:00) (104/66 - 175/106)  BP(mean): 116 (21 Dec 2017 12:00) (78 - 127)  RR: 21 (21 Dec 2017 12:00) (17 - 26)  SpO2: 95% (21 Dec 2017 12:00) (95% - 99%)    PHYSICAL EXAM:     The patient was alert and oriented X 3, well nourished, and in no  apparent distress.  OP showed no ulcerations  There was no visible lymphadenopathy.  Conjunctiva were non injected  There was no clubbing or edema of extremities.  The scalp, hair, face, eyebrows, lips, OP, neck, chest, back,   extremities X 4, nails were examined.  There was no hyperhidrosis or bromhidrosis.    Of note on skin exam: erythematous macules and patches scattered on bilateral thighs extending onto abdomen R>L, left dorsum of hand with scaly pink plaque                           17.1   9.9   )-----------( 128      ( 22 Dec 2017 05:41 )             51.8          22 Dec 2017 17:09    137    |  104    |  19     ----------------------------<  154    3.9     |  22     |  0.59     Ca    8.6        22 Dec 2017 17:09        Nona Johnston MD (PGY-2)   Dermatology Resident  [facility]  Pager: [phone]  Office [phone].

## 2017-12-22 NOTE — PROGRESS NOTE ADULT - SUBJECTIVE AND OBJECTIVE BOX
THE PATIENT WAS SEEN AND EXAMINED BY ME WITH THE HOUSESTAFF AND STROKE TEAM DURING MORNING ROUNDS.   HPI:  57 year old male pmhx of htn coming in with 1 day of dizziness prior to admission. Patient presented to ED when the dizziness persisted and was found to have cerebellar hemorrhage and 4th ventricle IVH (Vermian Hemorrhage) for which he was transferred to [facility].   Patient stated he takes no medication and has no pmhx but reportedly has hx of htn.Denies generalized weakness, and nausea. No fall or recent trauma. No numbness, tingling, weakness. No antiplatelets or anticoagulants. Patient currently denies and headache, n/v, blurry vision, double vision, numbness or weakness.       SUBJECTIVE: No events overnight.  No new neurologic complaints.      acetaminophen   Tablet 650 milliGRAM(s) Oral every 6 hours PRN  acetaminophen   Tablet. 650 milliGRAM(s) Oral every 6 hours PRN  amLODIPine   Tablet 10 milliGRAM(s) Oral daily  dextrose 5%. 1000 milliLiter(s) IV Continuous <Continuous>  dextrose 50% Injectable 12.5 Gram(s) IV Push once  dextrose 50% Injectable 25 Gram(s) IV Push once  dextrose 50% Injectable 25 Gram(s) IV Push once  dextrose Gel 1 Dose(s) Oral once PRN  diphenhydrAMINE   Capsule 25 milliGRAM(s) Oral at bedtime  docusate sodium 100 milliGRAM(s) Oral daily  enoxaparin Injectable 40 milliGRAM(s) SubCutaneous <User Schedule>  glucagon  Injectable 1 milliGRAM(s) IntraMuscular once PRN  hydrALAZINE Injectable 5 milliGRAM(s) IV Push once PRN  influenza   Vaccine 0.5 milliLiter(s) IntraMuscular once  insulin lispro (HumaLOG) corrective regimen sliding scale   SubCutaneous Before meals and at bedtime  insulin NPH human recombinant 4 Unit(s) SubCutaneous Before meals and at bedtime  lisinopril 40 milliGRAM(s) Oral daily  loratadine 10 milliGRAM(s) Oral daily  senna 2 Tablet(s) Oral at bedtime  sodium chloride 2% . 1000 milliLiter(s) IV Continuous <Continuous>      PHYSICAL EXAM:   Vital Signs Last 24 Hrs  T(C): 36.7 (22 Dec 2017 02:00), Max: 36.8 (21 Dec 2017 20:00)  T(F): 98.1 (22 Dec 2017 02:00), Max: 98.2 (21 Dec 2017 20:00)  HR: 69 (22 Dec 2017 06:00) (64 - 97)  BP: 138/90 (22 Dec 2017 06:00) (119/73 - 212/133)  BP(mean): 101 (22 Dec 2017 06:00) (93 - 155)  RR: 20 (22 Dec 2017 06:00) (17 - 24)  SpO2: 95% (22 Dec 2017 06:00) (95% - 99%)    General: Resting in bed. No acute distress  HEENT: PERRL,  EOM intact, visual fields full  Abdomen: Soft, nontender, nondistended   Extremities:  No edema. Lacy rash on thighs and abdomen    NEUROLOGICAL EXAM:  Mental status: Awake, alert, oriented x3. Able to follow simple commands. Speech is fluent.   Cranial Nerves: No facial asymmetry, no nystagmus, no dysarthria, no dysphagia, tongue is midline.  Motor exam: Normal tone, no drift. Strength 5/5 throughout.  Sensation: Intact to light touch   Coordination/ Gait: Subtle LUE intention tremor      LABS:                        17.1   9.9   )-----------( 128      ( 22 Dec 2017 05:41 )             51.8    12-22    136  |  104  |  21  ----------------------------<  97  4.6   |  21<L>  |  0.63    Ca    8.2<L>      22 Dec 2017 04:00      Hemoglobin A1C, Whole Blood: 6.7 % (12-17 @ 06:40)      IMAGING: Reviewed by me.   EXAM:  CT BRAIN                        DATE:  12/19/2017      INTERPRETATION:  History: Follow-up parenchymal hemorrhage.  Multiple axial sections were performed from base of skull to vertex   without contrast enhancement.  This exam is compared with prior noncontrast head CT performed on   December 18, 2017.  Parenchymal hemorrhage and surrounding edema is again seen in the   posterior fossa midline. This area. Or hemorrhage measures approximately   3.6 x 3.0 cm and previously measured approximately 3.4 x 3.1 cm. Mass   effect on the fourth ventricles again seen and unchanged.  The size and configuration of the prominent lateral third ventricles are   again seen and unchanged.  Periventricular white matter lucency is again seen and unchanged.  Evaluation of the osseous structures with the appropriate window appears   unremarkable  The visualized paranasal sinuses mastoid and middle ear regions appear   clear  Impression: Stable exam when underlying for differences in technique..    CT Angio Neck w/ IV Cont (12.15.17)  NONCONTRAST CT BRAIN: Acute intra-axial hemorrhage in the cerebellar   vermis causing mass effect on the fourth ventricle. Mild prominence of   the third and lateral ventricles. Heterogeneous calcified mass in the   extracalvarial soft tissues of the right occipital bone.  CTA BRAIN: Patent intracranial circulation. No flow-limiting stenosis or   occlusion.   CTA NECK: Patent cervical vasculature. No flow limiting stenosis or   occlusion. THE PATIENT WAS SEEN AND EXAMINED BY ME WITH THE HOUSESTAFF AND STROKE TEAM DURING MORNING ROUNDS.   HPI:  57 year old male pmhx of htn coming in with 1 day of dizziness prior to admission. Patient presented to ED when the dizziness persisted and was found to have cerebellar hemorrhage and 4th ventricle IVH (Vermian Hemorrhage) for which he was transferred to [facility].   Patient stated he takes no medication and has no pmhx but reportedly has hx of htn.Denies generalized weakness, and nausea. No fall or recent trauma. No numbness, tingling, weakness. No antiplatelets or anticoagulants. Patient currently denies and headache, n/v, blurry vision, double vision, numbness or weakness.       SUBJECTIVE: No events overnight.  No new neurologic complaints.      acetaminophen   Tablet 650 milliGRAM(s) Oral every 6 hours PRN  acetaminophen   Tablet. 650 milliGRAM(s) Oral every 6 hours PRN  amLODIPine   Tablet 10 milliGRAM(s) Oral daily  dextrose 5%. 1000 milliLiter(s) IV Continuous <Continuous>  dextrose 50% Injectable 12.5 Gram(s) IV Push once  dextrose 50% Injectable 25 Gram(s) IV Push once  dextrose 50% Injectable 25 Gram(s) IV Push once  dextrose Gel 1 Dose(s) Oral once PRN  diphenhydrAMINE   Capsule 25 milliGRAM(s) Oral at bedtime  docusate sodium 100 milliGRAM(s) Oral daily  enoxaparin Injectable 40 milliGRAM(s) SubCutaneous <User Schedule>  glucagon  Injectable 1 milliGRAM(s) IntraMuscular once PRN  hydrALAZINE Injectable 5 milliGRAM(s) IV Push once PRN  influenza   Vaccine 0.5 milliLiter(s) IntraMuscular once  insulin lispro (HumaLOG) corrective regimen sliding scale   SubCutaneous Before meals and at bedtime  insulin NPH human recombinant 4 Unit(s) SubCutaneous Before meals and at bedtime  lisinopril 40 milliGRAM(s) Oral daily  loratadine 10 milliGRAM(s) Oral daily  senna 2 Tablet(s) Oral at bedtime  sodium chloride 2% . 1000 milliLiter(s) IV Continuous <Continuous>      PHYSICAL EXAM:   Vital Signs Last 24 Hrs  T(C): 36.7 (22 Dec 2017 02:00), Max: 36.8 (21 Dec 2017 20:00)  T(F): 98.1 (22 Dec 2017 02:00), Max: 98.2 (21 Dec 2017 20:00)  HR: 69 (22 Dec 2017 06:00) (64 - 97)  BP: 138/90 (22 Dec 2017 06:00) (119/73 - 212/133)  BP(mean): 101 (22 Dec 2017 06:00) (93 - 155)  RR: 20 (22 Dec 2017 06:00) (17 - 24)  SpO2: 95% (22 Dec 2017 06:00) (95% - 99%)    General: Resting in bed. No acute distress  HEENT: PERRL,  EOM intact, visual fields full  Abdomen: Soft, nontender, nondistended   Extremities:  No edema. Lacy rash on thighs and abdomen    NEUROLOGICAL EXAM:  Mental status: Awake, alert, oriented x3. Able to follow simple commands. Speech is fluent.   Cranial Nerves: No facial asymmetry, no nystagmus, no dysarthria, tongue is midline.  Motor exam: Normal tone, no drift. Strength 5/5 throughout.  Sensation: Intact to light touch   Coordination/ Gait: Subtle LUE intention tremor      LABS:                        17.1   9.9   )-----------( 128      ( 22 Dec 2017 05:41 )             51.8    12-22    136  |  104  |  21  ----------------------------<  97  4.6   |  21<L>  |  0.63    Ca    8.2<L>      22 Dec 2017 04:00      Hemoglobin A1C, Whole Blood: 6.7 % (12-17 @ 06:40)      IMAGING: Reviewed by me.    CT BRAIN (12/19/2017)  History: Follow-up parenchymal hemorrhage. ultiple axial sections were performed from base of skull to vertex without contrast enhancement. This exam is compared with prior noncontrast head CT performed on December 18, 2017. Parenchymal hemorrhage and surrounding edema is again seen in the   posterior fossa midline. This area. Or hemorrhage measures approximately .6 x 3.0 cm and previously measured approximately 3.4 x 3.1 cm. Mass effect on the fourth ventricles again seen and unchanged. The size and configuration of the prominent lateral third ventricles are again seen and unchanged.  Periventricular white matter lucency is again seen and unchanged. Evaluation of the osseous structures with the appropriate window appears unremarkable. The visualized paranasal sinuses mastoid and middle ear regions appear clear    CT Angio Neck w/ IV Cont (12.15.17) CT BRAIN: Acute intra-axial hemorrhage in the cerebellar vermis causing mass effect on the fourth ventricle. Mild prominence of the third and lateral ventricles. Heterogeneous calcified mass in the extracalvarial soft tissues of the right occipital bone.  CTA BRAIN: Patent intracranial circulation. No flow-limiting stenosis or occlusion.   CTA NECK: Patent cervical vasculature. No flow limiting stenosis or occlusion. THE PATIENT WAS SEEN AND EXAMINED BY ME WITH THE HOUSESTAFF AND STROKE TEAM DURING MORNING ROUNDS.   HPI:  57 year old male pmhx of htn coming in with 1 day of dizziness prior to admission. Patient presented to ED when the dizziness persisted and was found to have cerebellar hemorrhage and 4th ventricle IVH (Vermian Hemorrhage) for which he was transferred to [facility].   Patient stated he takes no medication and has no pmhx but reportedly has hx of htn.Denies generalized weakness, and nausea. No fall or recent trauma. No numbness, tingling, weakness. No antiplatelets or anticoagulants. Patient currently denies and headache, n/v, blurry vision, double vision, numbness or weakness.       SUBJECTIVE: No events overnight.  No new neurologic complaints.      acetaminophen   Tablet 650 milliGRAM(s) Oral every 6 hours PRN  acetaminophen   Tablet. 650 milliGRAM(s) Oral every 6 hours PRN  amLODIPine   Tablet 10 milliGRAM(s) Oral daily  dextrose 5%. 1000 milliLiter(s) IV Continuous <Continuous>  dextrose 50% Injectable 12.5 Gram(s) IV Push once  dextrose 50% Injectable 25 Gram(s) IV Push once  dextrose 50% Injectable 25 Gram(s) IV Push once  dextrose Gel 1 Dose(s) Oral once PRN  diphenhydrAMINE   Capsule 25 milliGRAM(s) Oral at bedtime  docusate sodium 100 milliGRAM(s) Oral daily  enoxaparin Injectable 40 milliGRAM(s) SubCutaneous <User Schedule>  glucagon  Injectable 1 milliGRAM(s) IntraMuscular once PRN  hydrALAZINE Injectable 5 milliGRAM(s) IV Push once PRN  influenza   Vaccine 0.5 milliLiter(s) IntraMuscular once  insulin lispro (HumaLOG) corrective regimen sliding scale   SubCutaneous Before meals and at bedtime  insulin NPH human recombinant 4 Unit(s) SubCutaneous Before meals and at bedtime  lisinopril 40 milliGRAM(s) Oral daily  loratadine 10 milliGRAM(s) Oral daily  senna 2 Tablet(s) Oral at bedtime  sodium chloride 2% . 1000 milliLiter(s) IV Continuous <Continuous>      PHYSICAL EXAM:   Vital Signs Last 24 Hrs  T(C): 36.7 (22 Dec 2017 02:00), Max: 36.8 (21 Dec 2017 20:00)  T(F): 98.1 (22 Dec 2017 02:00), Max: 98.2 (21 Dec 2017 20:00)  HR: 69 (22 Dec 2017 06:00) (64 - 97)  BP: 138/90 (22 Dec 2017 06:00) (119/73 - 212/133)  BP(mean): 101 (22 Dec 2017 06:00) (93 - 155)  RR: 20 (22 Dec 2017 06:00) (17 - 24)  SpO2: 95% (22 Dec 2017 06:00) (95% - 99%)    General: Resting in bed. No acute distress  HEENT: PERRL,  EOM intact, visual fields full  Abdomen: Soft, nontender, nondistended   Extremities:  No edema. Lacy rash on thighs and abdomen    NEUROLOGICAL EXAM:  Mental status: Awake, alert, oriented x3. Able to follow simple commands. Speech is fluent.   Cranial Nerves: No facial asymmetry, no nystagmus, no dysarthria, tongue is midline.  Motor exam: Normal tone, no drift. Strength 5/5 throughout.  Sensation: Intact to light touch   Coordination/ Gait: Subtle LUE intention tremor      LABS:                        17.1   9.9   )-----------( 128      ( 22 Dec 2017 05:41 )             51.8    12-22    136  |  104  |  21  ----------------------------<  97  4.6   |  21<L>  |  0.63    Ca    8.2<L>      22 Dec 2017 04:00      Hemoglobin A1C, Whole Blood: 6.7 % (12-17 @ 06:40)  LDL 93      IMAGING: Reviewed by me.    CT BRAIN (12/19/2017)  History: Follow-up parenchymal hemorrhage. ultiple axial sections were performed from base of skull to vertex without contrast enhancement. This exam is compared with prior noncontrast head CT performed on December 18, 2017. Parenchymal hemorrhage and surrounding edema is again seen in the   posterior fossa midline. This area. Or hemorrhage measures approximately .6 x 3.0 cm and previously measured approximately 3.4 x 3.1 cm. Mass effect on the fourth ventricles again seen and unchanged. The size and configuration of the prominent lateral third ventricles are again seen and unchanged.  Periventricular white matter lucency is again seen and unchanged. Evaluation of the osseous structures with the appropriate window appears unremarkable. The visualized paranasal sinuses mastoid and middle ear regions appear clear    CT Angio Neck w/ IV Cont (12.15.17) CT BRAIN: Acute intra-axial hemorrhage in the cerebellar vermis causing mass effect on the fourth ventricle. Mild prominence of the third and lateral ventricles. Heterogeneous calcified mass in the extracalvarial soft tissues of the right occipital bone.  CTA BRAIN: Patent intracranial circulation. No flow-limiting stenosis or occlusion.   CTA NECK: Patent cervical vasculature. No flow limiting stenosis or occlusion.   Cerebral Angiogram (12.20.17) : Preliminar report normal pial and dural surfaces. No aneurysms. No venous occlusions.

## 2017-12-23 LAB
ANION GAP SERPL CALC-SCNC: 10 MMOL/L — SIGNIFICANT CHANGE UP (ref 5–17)
ANION GAP SERPL CALC-SCNC: 11 MMOL/L — SIGNIFICANT CHANGE UP (ref 5–17)
ANION GAP SERPL CALC-SCNC: 9 MMOL/L — SIGNIFICANT CHANGE UP (ref 5–17)
BUN SERPL-MCNC: 17 MG/DL — SIGNIFICANT CHANGE UP (ref 7–23)
BUN SERPL-MCNC: 18 MG/DL — SIGNIFICANT CHANGE UP (ref 7–23)
BUN SERPL-MCNC: 19 MG/DL — SIGNIFICANT CHANGE UP (ref 7–23)
CALCIUM SERPL-MCNC: 8.4 MG/DL — SIGNIFICANT CHANGE UP (ref 8.4–10.5)
CALCIUM SERPL-MCNC: 8.4 MG/DL — SIGNIFICANT CHANGE UP (ref 8.4–10.5)
CALCIUM SERPL-MCNC: 8.6 MG/DL — SIGNIFICANT CHANGE UP (ref 8.4–10.5)
CHLORIDE SERPL-SCNC: 104 MMOL/L — SIGNIFICANT CHANGE UP (ref 96–108)
CHLORIDE SERPL-SCNC: 105 MMOL/L — SIGNIFICANT CHANGE UP (ref 96–108)
CHLORIDE SERPL-SCNC: 106 MMOL/L — SIGNIFICANT CHANGE UP (ref 96–108)
CO2 SERPL-SCNC: 22 MMOL/L — SIGNIFICANT CHANGE UP (ref 22–31)
CO2 SERPL-SCNC: 23 MMOL/L — SIGNIFICANT CHANGE UP (ref 22–31)
CO2 SERPL-SCNC: 25 MMOL/L — SIGNIFICANT CHANGE UP (ref 22–31)
CREAT SERPL-MCNC: 0.54 MG/DL — SIGNIFICANT CHANGE UP (ref 0.5–1.3)
CREAT SERPL-MCNC: 0.6 MG/DL — SIGNIFICANT CHANGE UP (ref 0.5–1.3)
CREAT SERPL-MCNC: 0.6 MG/DL — SIGNIFICANT CHANGE UP (ref 0.5–1.3)
CULTURE RESULTS: SIGNIFICANT CHANGE UP
GLUCOSE BLDC GLUCOMTR-MCNC: 128 MG/DL — HIGH (ref 70–99)
GLUCOSE BLDC GLUCOMTR-MCNC: 184 MG/DL — HIGH (ref 70–99)
GLUCOSE BLDC GLUCOMTR-MCNC: 89 MG/DL — SIGNIFICANT CHANGE UP (ref 70–99)
GLUCOSE BLDC GLUCOMTR-MCNC: 98 MG/DL — SIGNIFICANT CHANGE UP (ref 70–99)
GLUCOSE BLDC GLUCOMTR-MCNC: 99 MG/DL — SIGNIFICANT CHANGE UP (ref 70–99)
GLUCOSE SERPL-MCNC: 111 MG/DL — HIGH (ref 70–99)
GLUCOSE SERPL-MCNC: 119 MG/DL — HIGH (ref 70–99)
GLUCOSE SERPL-MCNC: 219 MG/DL — HIGH (ref 70–99)
HCT VFR BLD CALC: 44.4 % — SIGNIFICANT CHANGE UP (ref 39–50)
HGB BLD-MCNC: 15.2 G/DL — SIGNIFICANT CHANGE UP (ref 13–17)
MCHC RBC-ENTMCNC: 31.4 PG — SIGNIFICANT CHANGE UP (ref 27–34)
MCHC RBC-ENTMCNC: 34.2 GM/DL — SIGNIFICANT CHANGE UP (ref 32–36)
MCV RBC AUTO: 91.8 FL — SIGNIFICANT CHANGE UP (ref 80–100)
PLATELET # BLD AUTO: 241 K/UL — SIGNIFICANT CHANGE UP (ref 150–400)
POTASSIUM SERPL-MCNC: 3.8 MMOL/L — SIGNIFICANT CHANGE UP (ref 3.5–5.3)
POTASSIUM SERPL-MCNC: 4.1 MMOL/L — SIGNIFICANT CHANGE UP (ref 3.5–5.3)
POTASSIUM SERPL-MCNC: 4.1 MMOL/L — SIGNIFICANT CHANGE UP (ref 3.5–5.3)
POTASSIUM SERPL-SCNC: 3.8 MMOL/L — SIGNIFICANT CHANGE UP (ref 3.5–5.3)
POTASSIUM SERPL-SCNC: 4.1 MMOL/L — SIGNIFICANT CHANGE UP (ref 3.5–5.3)
POTASSIUM SERPL-SCNC: 4.1 MMOL/L — SIGNIFICANT CHANGE UP (ref 3.5–5.3)
RBC # BLD: 4.84 M/UL — SIGNIFICANT CHANGE UP (ref 4.2–5.8)
RBC # FLD: 13.3 % — SIGNIFICANT CHANGE UP (ref 10.3–14.5)
SODIUM SERPL-SCNC: 138 MMOL/L — SIGNIFICANT CHANGE UP (ref 135–145)
SODIUM SERPL-SCNC: 138 MMOL/L — SIGNIFICANT CHANGE UP (ref 135–145)
SODIUM SERPL-SCNC: 139 MMOL/L — SIGNIFICANT CHANGE UP (ref 135–145)
SPECIMEN SOURCE: SIGNIFICANT CHANGE UP
WBC # BLD: 9.8 K/UL — SIGNIFICANT CHANGE UP (ref 3.8–10.5)
WBC # FLD AUTO: 9.8 K/UL — SIGNIFICANT CHANGE UP (ref 3.8–10.5)

## 2017-12-23 PROCEDURE — 99233 SBSQ HOSP IP/OBS HIGH 50: CPT

## 2017-12-23 RX ORDER — ACETAMINOPHEN 500 MG
1000 TABLET ORAL ONCE
Qty: 0 | Refills: 0 | Status: COMPLETED | OUTPATIENT
Start: 2017-12-23 | End: 2017-12-23

## 2017-12-23 RX ORDER — METOPROLOL TARTRATE 50 MG
12.5 TABLET ORAL
Qty: 0 | Refills: 0 | Status: DISCONTINUED | OUTPATIENT
Start: 2017-12-23 | End: 2017-12-28

## 2017-12-23 RX ORDER — METOPROLOL TARTRATE 50 MG
12.5 TABLET ORAL DAILY
Qty: 0 | Refills: 0 | Status: DISCONTINUED | OUTPATIENT
Start: 2017-12-23 | End: 2017-12-23

## 2017-12-23 RX ORDER — HYDRALAZINE HCL 50 MG
5 TABLET ORAL ONCE
Qty: 0 | Refills: 0 | Status: COMPLETED | OUTPATIENT
Start: 2017-12-23 | End: 2017-12-23

## 2017-12-23 RX ADMIN — Medication 100 MILLIGRAM(S): at 11:36

## 2017-12-23 RX ADMIN — Medication 1 APPLICATION(S): at 06:47

## 2017-12-23 RX ADMIN — Medication 1 APPLICATION(S): at 17:38

## 2017-12-23 RX ADMIN — LORATADINE 10 MILLIGRAM(S): 10 TABLET ORAL at 17:37

## 2017-12-23 RX ADMIN — Medication 2: at 11:36

## 2017-12-23 RX ADMIN — Medication 650 MILLIGRAM(S): at 09:07

## 2017-12-23 RX ADMIN — ENOXAPARIN SODIUM 40 MILLIGRAM(S): 100 INJECTION SUBCUTANEOUS at 17:37

## 2017-12-23 RX ADMIN — Medication 12.5 MILLIGRAM(S): at 17:37

## 2017-12-23 RX ADMIN — Medication 5 MILLIGRAM(S): at 00:45

## 2017-12-23 RX ADMIN — HUMAN INSULIN 4 UNIT(S): 100 INJECTION, SUSPENSION SUBCUTANEOUS at 17:37

## 2017-12-23 RX ADMIN — LORATADINE 10 MILLIGRAM(S): 10 TABLET ORAL at 06:47

## 2017-12-23 RX ADMIN — HUMAN INSULIN 4 UNIT(S): 100 INJECTION, SUSPENSION SUBCUTANEOUS at 22:30

## 2017-12-23 RX ADMIN — Medication 650 MILLIGRAM(S): at 21:28

## 2017-12-23 RX ADMIN — SODIUM CHLORIDE 30 MILLILITER(S): 5 INJECTION, SOLUTION INTRAVENOUS at 09:08

## 2017-12-23 RX ADMIN — SENNA PLUS 2 TABLET(S): 8.6 TABLET ORAL at 22:30

## 2017-12-23 RX ADMIN — HUMAN INSULIN 4 UNIT(S): 100 INJECTION, SUSPENSION SUBCUTANEOUS at 11:36

## 2017-12-23 RX ADMIN — Medication 25 MILLIGRAM(S): at 22:30

## 2017-12-23 RX ADMIN — AMLODIPINE BESYLATE 10 MILLIGRAM(S): 2.5 TABLET ORAL at 22:30

## 2017-12-23 RX ADMIN — HUMAN INSULIN 4 UNIT(S): 100 INJECTION, SUSPENSION SUBCUTANEOUS at 09:06

## 2017-12-23 RX ADMIN — Medication 650 MILLIGRAM(S): at 09:37

## 2017-12-23 RX ADMIN — LISINOPRIL 40 MILLIGRAM(S): 2.5 TABLET ORAL at 11:36

## 2017-12-23 RX ADMIN — Medication 650 MILLIGRAM(S): at 22:00

## 2017-12-23 NOTE — PROGRESS NOTE ADULT - SUBJECTIVE AND OBJECTIVE BOX
THE PATIENT WAS SEEN AND EXAMINED BY ME WITH THE HOUSESTAFF AND STROKE TEAM DURING MORNING ROUNDS.   HPI:  57 year old male pmhx of htn coming in with 1 day of dizziness prior to admission. Patient presented to ED when the dizziness persisted and was found to have cerebellar hemorrhage and 4th ventricle IVH (Vermian Hemorrhage) for which he was transferred to [facility].   Patient stated he takes no medication and has no pmhx but reportedly has hx of htn.Denies generalized weakness, and nausea. No fall or recent trauma. No numbness, tingling, weakness. No antiplatelets or anticoagulants. Patient currently denies and headache, n/v, blurry vision, double vision, numbness or weakness.       SUBJECTIVE: No events overnight.  No new neurologic complaints.      acetaminophen   Tablet 650 milliGRAM(s) Oral every 6 hours PRN  acetaminophen   Tablet. 650 milliGRAM(s) Oral every 6 hours PRN  amLODIPine   Tablet 10 milliGRAM(s) Oral daily  dextrose 5%. 1000 milliLiter(s) IV Continuous <Continuous>  dextrose 50% Injectable 12.5 Gram(s) IV Push once  dextrose 50% Injectable 25 Gram(s) IV Push once  dextrose 50% Injectable 25 Gram(s) IV Push once  dextrose Gel 1 Dose(s) Oral once PRN  diphenhydrAMINE   Capsule 25 milliGRAM(s) Oral at bedtime  docusate sodium 100 milliGRAM(s) Oral daily  enoxaparin Injectable 40 milliGRAM(s) SubCutaneous <User Schedule>  glucagon  Injectable 1 milliGRAM(s) IntraMuscular once PRN  influenza   Vaccine 0.5 milliLiter(s) IntraMuscular once  insulin lispro (HumaLOG) corrective regimen sliding scale   SubCutaneous Before meals and at bedtime  insulin NPH human recombinant 4 Unit(s) SubCutaneous Before meals and at bedtime  lisinopril 40 milliGRAM(s) Oral daily  loratadine 10 milliGRAM(s) Oral <User Schedule>  metoprolol     tartrate 12.5 milliGRAM(s) Oral two times a day  senna 2 Tablet(s) Oral at bedtime  triamcinolone 0.1% Ointment 1 Application(s) Topical two times a day      PHYSICAL EXAM:   Vital Signs Last 24 Hrs  T(C): 36.8 (24 Dec 2017 06:00), Max: 36.8 (23 Dec 2017 16:00)  T(F): 98.2 (24 Dec 2017 06:00), Max: 98.2 (23 Dec 2017 16:00)  HR: 79 (24 Dec 2017 12:00) (60 - 83)  BP: 152/92 (24 Dec 2017 12:00) (102/66 - 161/90)  BP(mean): 112 (24 Dec 2017 12:00) (77 - 112)  RR: 17 (24 Dec 2017 12:00) (13 - 22)  SpO2: 100% (24 Dec 2017 12:00) (96% - 100%)    General: Resting in bed. No acute distress  HEENT: PERRL,  EOM intact, visual fields full  Abdomen: Soft, nontender, nondistended   Extremities:  No edema. Lacy rash on thighs and abdomen    NEUROLOGICAL EXAM:  Mental status: Awake, alert, oriented x3. Able to follow simple commands. Speech is fluent.   Cranial Nerves: No facial asymmetry, no nystagmus, no dysarthria, tongue is midline.  Motor exam: Normal tone, no drift. Strength 5/5 throughout.  Sensation: Intact to light touch   Coordination/ Gait: Subtle LUE intention tremor        LABS:                        16.4   11.3  )-----------( 267      ( 24 Dec 2017 05:34 )             48.9    12-24    138  |  103  |  15  ----------------------------<  112<H>  3.6   |  25  |  0.60    Ca    8.6      24 Dec 2017 05:34      Hemoglobin A1C, Whole Blood: 6.7 % (12-17 @ 06:40)      IMAGING: Reviewed by me.     CT BRAIN (12/19/2017)  History: Follow-up parenchymal hemorrhage. ultiple axial sections were performed from base of skull to vertex without contrast enhancement. This exam is compared with prior noncontrast head CT performed on December 18, 2017. Parenchymal hemorrhage and surrounding edema is again seen in the   posterior fossa midline. This area. Or hemorrhage measures approximately .6 x 3.0 cm and previously measured approximately 3.4 x 3.1 cm. Mass effect on the fourth ventricles again seen and unchanged. The size and configuration of the prominent lateral third ventricles are again seen and unchanged.  Periventricular white matter lucency is again seen and unchanged. Evaluation of the osseous structures with the appropriate window appears unremarkable. The visualized paranasal sinuses mastoid and middle ear regions appear clear    CT Angio Neck w/ IV Cont (12.15.17) CT BRAIN: Acute intra-axial hemorrhage in the cerebellar vermis causing mass effect on the fourth ventricle. Mild prominence of the third and lateral ventricles. Heterogeneous calcified mass in the extracalvarial soft tissues of the right occipital bone.  CTA BRAIN: Patent intracranial circulation. No flow-limiting stenosis or occlusion.   CTA NECK: Patent cervical vasculature. No flow limiting stenosis or occlusion.   Cerebral Angiogram (12.20.17) : Preliminar report normal pial and dural surfaces. No aneurysms. No venous occlusions.

## 2017-12-23 NOTE — PROGRESS NOTE ADULT - ASSESSMENT
ASSESSMENT: This is a 57yman with one day of dizziness prior to admission. Patient presented to ED when the dizziness persisted and was found to have cerebellar hemorrhage and 4th ventricle IVH (Vermian Hemorrhage) for which he was transferred to [facility]. MRI shows multiple areas of micro-hemorrhage involving right frontal motor strip, right thalamic and left paramedian modesto, and right dorsal modesto, areas of restricted diffusion on left parietal/occipital and left frontal territory. Impression diagnosis: Non-traumatic vermian hemorrhage with IVH with multiple areas susceptibility at SWI compatible with hypertensive microangiopathy. Areas of restricted diffusion at Brain MRI with no clinical significance and in the setting of angiography commonly seen post-procedure. Angiography done 12/20/17 with no evidence of dural or pial AVM, or aneurysms.     NEURO: Neurologically improved vs admission, s/p cerebral angiography 12/20: demonstrated Normal pial and dural surfaces. No aneurysms. No venous occlusions, continue close monitoring for neurologic deterioration in setting of cerebral edema with mass effect and brain compression, continue 2% Saline in setting of hyponatremia and mass effect, goal of -155. as per neurosurgery eval: no acute intervention needed at this time, BP goal <140/90. LDL 93 no role of statin therapy in this setting. Plan to obtain repeat neuroimaging in 4-6 weeks to r/o underlying pathology. PMR for AR.     ANTITHROMBOTIC THERAPY:  No indication.     PULMONARY: Protecting airway, saturating well on room air.     CARDIOVASCULAR: TTE: unremarkable, Continue cardiac monitoring, slowly titrate anti-htn regimen accordingly. Will start lopressor 12.5 mg for BP management  BID                             SBP goal: 140-160    GASTROINTESTINAL: dysphagia screen passed. Tolerating diet      Diet: Regular. Consistent Carb.    RENAL: BUN/Cr without acute change, maintain adequate hydration, good urine output, hyponatremic started on 2% in setting of mass effect, monitor bmp Q6      Na Goal: Greater than 140     Rbay: n        HEMATOLOGY: H/H  Platelets without acute change, no active bleeding,     DVT ppx: LMWH      ID: afebrile,  No leukocytosis , monitor for si/sx of infection and encourage incentive spirometry    Other:  small area of ecchymosis but not hematoma at puncture site with 2+ DP pulses.  Upon closer exam, aubrie rash noted on thighs and encroaching the right groin and abdomen, started on Cetirizine and benadryl, most likely allergic response to contrast.  Appreciate derm consult     DISPOSITION: [facility] rehab once stable and workup complete.      CORE MEASURES:        Admission NIHSS: No     TPA: [] YES [x] NO      LDL/HDL: NO     Depression Screen: pending     Statin Therapy: NO     Dysphagia Screen: [x] PASS [] FAIL     Smoking [] YES [x] NO      Afib [] YES [x] NO     Stroke Education [x] YES [] NO

## 2017-12-24 LAB
ANION GAP SERPL CALC-SCNC: 10 MMOL/L — SIGNIFICANT CHANGE UP (ref 5–17)
ANION GAP SERPL CALC-SCNC: 11 MMOL/L — SIGNIFICANT CHANGE UP (ref 5–17)
BUN SERPL-MCNC: 14 MG/DL — SIGNIFICANT CHANGE UP (ref 7–23)
BUN SERPL-MCNC: 15 MG/DL — SIGNIFICANT CHANGE UP (ref 7–23)
CALCIUM SERPL-MCNC: 8.5 MG/DL — SIGNIFICANT CHANGE UP (ref 8.4–10.5)
CALCIUM SERPL-MCNC: 8.6 MG/DL — SIGNIFICANT CHANGE UP (ref 8.4–10.5)
CHLORIDE SERPL-SCNC: 103 MMOL/L — SIGNIFICANT CHANGE UP (ref 96–108)
CHLORIDE SERPL-SCNC: 103 MMOL/L — SIGNIFICANT CHANGE UP (ref 96–108)
CO2 SERPL-SCNC: 25 MMOL/L — SIGNIFICANT CHANGE UP (ref 22–31)
CO2 SERPL-SCNC: 25 MMOL/L — SIGNIFICANT CHANGE UP (ref 22–31)
CREAT SERPL-MCNC: 0.59 MG/DL — SIGNIFICANT CHANGE UP (ref 0.5–1.3)
CREAT SERPL-MCNC: 0.6 MG/DL — SIGNIFICANT CHANGE UP (ref 0.5–1.3)
GLUCOSE BLDC GLUCOMTR-MCNC: 136 MG/DL — HIGH (ref 70–99)
GLUCOSE BLDC GLUCOMTR-MCNC: 139 MG/DL — HIGH (ref 70–99)
GLUCOSE BLDC GLUCOMTR-MCNC: 174 MG/DL — HIGH (ref 70–99)
GLUCOSE BLDC GLUCOMTR-MCNC: 190 MG/DL — HIGH (ref 70–99)
GLUCOSE BLDC GLUCOMTR-MCNC: 277 MG/DL — HIGH (ref 70–99)
GLUCOSE SERPL-MCNC: 112 MG/DL — HIGH (ref 70–99)
GLUCOSE SERPL-MCNC: 130 MG/DL — HIGH (ref 70–99)
HCT VFR BLD CALC: 48.9 % — SIGNIFICANT CHANGE UP (ref 39–50)
HGB BLD-MCNC: 16.4 G/DL — SIGNIFICANT CHANGE UP (ref 13–17)
MCHC RBC-ENTMCNC: 30.7 PG — SIGNIFICANT CHANGE UP (ref 27–34)
MCHC RBC-ENTMCNC: 33.5 GM/DL — SIGNIFICANT CHANGE UP (ref 32–36)
MCV RBC AUTO: 91.5 FL — SIGNIFICANT CHANGE UP (ref 80–100)
PLATELET # BLD AUTO: 267 K/UL — SIGNIFICANT CHANGE UP (ref 150–400)
POTASSIUM SERPL-MCNC: 3.6 MMOL/L — SIGNIFICANT CHANGE UP (ref 3.5–5.3)
POTASSIUM SERPL-MCNC: 3.7 MMOL/L — SIGNIFICANT CHANGE UP (ref 3.5–5.3)
POTASSIUM SERPL-SCNC: 3.6 MMOL/L — SIGNIFICANT CHANGE UP (ref 3.5–5.3)
POTASSIUM SERPL-SCNC: 3.7 MMOL/L — SIGNIFICANT CHANGE UP (ref 3.5–5.3)
RBC # BLD: 5.34 M/UL — SIGNIFICANT CHANGE UP (ref 4.2–5.8)
RBC # FLD: 13.4 % — SIGNIFICANT CHANGE UP (ref 10.3–14.5)
SODIUM SERPL-SCNC: 138 MMOL/L — SIGNIFICANT CHANGE UP (ref 135–145)
SODIUM SERPL-SCNC: 139 MMOL/L — SIGNIFICANT CHANGE UP (ref 135–145)
WBC # BLD: 11.3 K/UL — HIGH (ref 3.8–10.5)
WBC # FLD AUTO: 11.3 K/UL — HIGH (ref 3.8–10.5)

## 2017-12-24 PROCEDURE — 99233 SBSQ HOSP IP/OBS HIGH 50: CPT

## 2017-12-24 RX ORDER — SODIUM CHLORIDE 5 G/100ML
1000 INJECTION, SOLUTION INTRAVENOUS
Qty: 0 | Refills: 0 | Status: DISCONTINUED | OUTPATIENT
Start: 2017-12-24 | End: 2017-12-25

## 2017-12-24 RX ADMIN — HUMAN INSULIN 4 UNIT(S): 100 INJECTION, SUSPENSION SUBCUTANEOUS at 22:30

## 2017-12-24 RX ADMIN — Medication 12.5 MILLIGRAM(S): at 17:29

## 2017-12-24 RX ADMIN — ENOXAPARIN SODIUM 40 MILLIGRAM(S): 100 INJECTION SUBCUTANEOUS at 17:29

## 2017-12-24 RX ADMIN — Medication 2: at 22:30

## 2017-12-24 RX ADMIN — SENNA PLUS 2 TABLET(S): 8.6 TABLET ORAL at 21:27

## 2017-12-24 RX ADMIN — LORATADINE 10 MILLIGRAM(S): 10 TABLET ORAL at 17:30

## 2017-12-24 RX ADMIN — Medication 25 MILLIGRAM(S): at 21:26

## 2017-12-24 RX ADMIN — Medication 1 APPLICATION(S): at 06:30

## 2017-12-24 RX ADMIN — Medication 650 MILLIGRAM(S): at 09:00

## 2017-12-24 RX ADMIN — LISINOPRIL 40 MILLIGRAM(S): 2.5 TABLET ORAL at 12:19

## 2017-12-24 RX ADMIN — Medication 2: at 12:19

## 2017-12-24 RX ADMIN — Medication 100 MILLIGRAM(S): at 12:19

## 2017-12-24 RX ADMIN — Medication 650 MILLIGRAM(S): at 08:05

## 2017-12-24 RX ADMIN — Medication 1 APPLICATION(S): at 17:30

## 2017-12-24 RX ADMIN — HUMAN INSULIN 4 UNIT(S): 100 INJECTION, SUSPENSION SUBCUTANEOUS at 12:20

## 2017-12-24 RX ADMIN — AMLODIPINE BESYLATE 10 MILLIGRAM(S): 2.5 TABLET ORAL at 21:26

## 2017-12-24 RX ADMIN — HUMAN INSULIN 4 UNIT(S): 100 INJECTION, SUSPENSION SUBCUTANEOUS at 17:29

## 2017-12-24 RX ADMIN — SODIUM CHLORIDE 30 MILLILITER(S): 5 INJECTION, SOLUTION INTRAVENOUS at 22:09

## 2017-12-24 RX ADMIN — Medication 12.5 MILLIGRAM(S): at 08:10

## 2017-12-24 RX ADMIN — LORATADINE 10 MILLIGRAM(S): 10 TABLET ORAL at 06:29

## 2017-12-24 RX ADMIN — HUMAN INSULIN 4 UNIT(S): 100 INJECTION, SUSPENSION SUBCUTANEOUS at 08:09

## 2017-12-24 NOTE — PROGRESS NOTE ADULT - SUBJECTIVE AND OBJECTIVE BOX
THE PATIENT WAS SEEN AND EXAMINED BY ME WITH THE HOUSESTAFF AND STROKE TEAM DURING MORNING ROUNDS.   HPI:  57 year old male pmhx of htn coming in with 1 day of dizziness prior to admission. Patient presented to ED when the dizziness persisted and was found to have cerebellar hemorrhage and 4th ventricle IVH (Vermian Hemorrhage) for which he was transferred to [facility].   Patient stated he takes no medication and has no pmhx but reportedly has hx of htn.Denies generalized weakness, and nausea. No fall or recent trauma. No numbness, tingling, weakness. No antiplatelets or anticoagulants. Patient currently denies and headache, n/v, blurry vision, double vision, numbness or weakness.     SUBJECTIVE: No events overnight.  No new neurologic complaints.      acetaminophen   Tablet 650 milliGRAM(s) Oral every 6 hours PRN  acetaminophen   Tablet. 650 milliGRAM(s) Oral every 6 hours PRN  amLODIPine   Tablet 10 milliGRAM(s) Oral daily  dextrose 5%. 1000 milliLiter(s) IV Continuous <Continuous>  dextrose 50% Injectable 12.5 Gram(s) IV Push once  dextrose 50% Injectable 25 Gram(s) IV Push once  dextrose 50% Injectable 25 Gram(s) IV Push once  dextrose Gel 1 Dose(s) Oral once PRN  diphenhydrAMINE   Capsule 25 milliGRAM(s) Oral at bedtime  docusate sodium 100 milliGRAM(s) Oral daily  enoxaparin Injectable 40 milliGRAM(s) SubCutaneous <User Schedule>  glucagon  Injectable 1 milliGRAM(s) IntraMuscular once PRN  influenza   Vaccine 0.5 milliLiter(s) IntraMuscular once  insulin lispro (HumaLOG) corrective regimen sliding scale   SubCutaneous Before meals and at bedtime  insulin NPH human recombinant 4 Unit(s) SubCutaneous Before meals and at bedtime  lisinopril 40 milliGRAM(s) Oral daily  loratadine 10 milliGRAM(s) Oral <User Schedule>  metoprolol     tartrate 12.5 milliGRAM(s) Oral two times a day  senna 2 Tablet(s) Oral at bedtime  triamcinolone 0.1% Ointment 1 Application(s) Topical two times a day      PHYSICAL EXAM:   Vital Signs Last 24 Hrs  T(C): 36.8 (24 Dec 2017 06:00), Max: 36.8 (23 Dec 2017 16:00)  T(F): 98.2 (24 Dec 2017 06:00), Max: 98.2 (23 Dec 2017 16:00)  HR: 79 (24 Dec 2017 12:00) (60 - 83)  BP: 152/92 (24 Dec 2017 12:00) (102/66 - 161/90)  BP(mean): 112 (24 Dec 2017 12:00) (77 - 112)  RR: 17 (24 Dec 2017 12:00) (13 - 22)  SpO2: 100% (24 Dec 2017 12:00) (96% - 100%)    General: Resting in bed. No acute distress  HEENT: PERRL,  EOM intact, visual fields full  Abdomen: Soft, nontender, nondistended   Extremities:  No edema. Lacy rash on thighs and abdomen    NEUROLOGICAL EXAM:  Mental status: Awake, alert, oriented x3. Able to follow simple commands. Speech is fluent.   Cranial Nerves: No facial asymmetry, no nystagmus, no dysarthria, tongue is midline.  Motor exam: Normal tone, no drift. Strength 5/5 throughout.  Sensation: Intact to light touch   Coordination/ Gait: Subtle LUE intention tremor    LABS:                        16.4   11.3  )-----------( 267      ( 24 Dec 2017 05:34 )             48.9    12-24    138  |  103  |  15  ----------------------------<  112<H>  3.6   |  25  |  0.60    Ca    8.6      24 Dec 2017 05:34      Hemoglobin A1C, Whole Blood: 6.7 % (12-17 @ 06:40)      IMAGING: Reviewed by me.     CT BRAIN (12/19/2017)  History: Follow-up parenchymal hemorrhage. ultiple axial sections were performed from base of skull to vertex without contrast enhancement. This exam is compared with prior noncontrast head CT performed on December 18, 2017. Parenchymal hemorrhage and surrounding edema is again seen in the   posterior fossa midline. This area. Or hemorrhage measures approximately .6 x 3.0 cm and previously measured approximately 3.4 x 3.1 cm. Mass effect on the fourth ventricles again seen and unchanged. The size and configuration of the prominent lateral third ventricles are again seen and unchanged.  Periventricular white matter lucency is again seen and unchanged. Evaluation of the osseous structures with the appropriate window appears unremarkable. The visualized paranasal sinuses mastoid and middle ear regions appear clear    CT Angio Neck w/ IV Cont (12.15.17) CT BRAIN: Acute intra-axial hemorrhage in the cerebellar vermis causing mass effect on the fourth ventricle. Mild prominence of the third and lateral ventricles. Heterogeneous calcified mass in the extracalvarial soft tissues of the right occipital bone.  CTA BRAIN: Patent intracranial circulation. No flow-limiting stenosis or occlusion.   CTA NECK: Patent cervical vasculature. No flow limiting stenosis or occlusion.   Cerebral Angiogram (12.20.17) : Preliminary report normal pial and dural surfaces. No aneurysms. No venous occlusions. THE PATIENT WAS SEEN AND EXAMINED BY ME WITH THE HOUSESTAFF AND STROKE TEAM DURING MORNING ROUNDS.   HPI:  57 year old male pmhx of htn coming in with 1 day of dizziness prior to admission. Patient presented to ED when the dizziness persisted and was found to have cerebellar hemorrhage and 4th ventricle IVH (Vermian Hemorrhage) for which he was transferred to [facility].   Patient stated he takes no medication and has no pmhx but reportedly has hx of htn.Denies generalized weakness, and nausea. No fall or recent trauma. No numbness, tingling, weakness. No antiplatelets or anticoagulants. Patient currently denies and headache, n/v, blurry vision, double vision, numbness or weakness.     SUBJECTIVE: No events overnight.  No new neurologic complaints.      acetaminophen   Tablet 650 milliGRAM(s) Oral every 6 hours PRN  acetaminophen   Tablet. 650 milliGRAM(s) Oral every 6 hours PRN  amLODIPine   Tablet 10 milliGRAM(s) Oral daily  dextrose 5%. 1000 milliLiter(s) IV Continuous <Continuous>  dextrose 50% Injectable 12.5 Gram(s) IV Push once  dextrose 50% Injectable 25 Gram(s) IV Push once  dextrose 50% Injectable 25 Gram(s) IV Push once  dextrose Gel 1 Dose(s) Oral once PRN  diphenhydrAMINE   Capsule 25 milliGRAM(s) Oral at bedtime  docusate sodium 100 milliGRAM(s) Oral daily  enoxaparin Injectable 40 milliGRAM(s) SubCutaneous <User Schedule>  glucagon  Injectable 1 milliGRAM(s) IntraMuscular once PRN  influenza   Vaccine 0.5 milliLiter(s) IntraMuscular once  insulin lispro (HumaLOG) corrective regimen sliding scale   SubCutaneous Before meals and at bedtime  insulin NPH human recombinant 4 Unit(s) SubCutaneous Before meals and at bedtime  lisinopril 40 milliGRAM(s) Oral daily  loratadine 10 milliGRAM(s) Oral <User Schedule>  metoprolol     tartrate 12.5 milliGRAM(s) Oral two times a day  senna 2 Tablet(s) Oral at bedtime  triamcinolone 0.1% Ointment 1 Application(s) Topical two times a day      PHYSICAL EXAM:   Vital Signs Last 24 Hrs  T(C): 36.8 (24 Dec 2017 06:00), Max: 36.8 (23 Dec 2017 16:00)  T(F): 98.2 (24 Dec 2017 06:00), Max: 98.2 (23 Dec 2017 16:00)  HR: 79 (24 Dec 2017 12:00) (60 - 83)  BP: 152/92 (24 Dec 2017 12:00) (102/66 - 161/90)  BP(mean): 112 (24 Dec 2017 12:00) (77 - 112)  RR: 17 (24 Dec 2017 12:00) (13 - 22)  SpO2: 100% (24 Dec 2017 12:00) (96% - 100%)    General: Resting in bed. No acute distress  HEENT: PERRL,  EOM intact, visual fields full  Abdomen: Soft, nontender, nondistended   Extremities:  No edema. Lacy rash on thighs and abdomen    NEUROLOGICAL EXAM: Mandarin intrepreter # [id number]  Mental status: Awake, alert, oriented x3. Able to follow simple commands. Speech is fluent.   Cranial Nerves: No facial asymmetry, no nystagmus, no dysarthria, tongue is midline.  Motor exam: Normal tone, no drift. Strength 5/5 throughout.  Sensation: Intact to light touch   Coordination/ Gait: Subtle LUE intention tremor    LABS:                        16.4   11.3  )-----------( 267      ( 24 Dec 2017 05:34 )             48.9    12-24    138  |  103  |  15  ----------------------------<  112<H>  3.6   |  25  |  0.60    Ca    8.6      24 Dec 2017 05:34      Hemoglobin A1C, Whole Blood: 6.7 % (12-17 @ 06:40)      IMAGING: Reviewed by me.     CT BRAIN (12/19/2017)  History: Follow-up parenchymal hemorrhage. ultiple axial sections were performed from base of skull to vertex without contrast enhancement. This exam is compared with prior noncontrast head CT performed on December 18, 2017. Parenchymal hemorrhage and surrounding edema is again seen in the   posterior fossa midline. This area. Or hemorrhage measures approximately .6 x 3.0 cm and previously measured approximately 3.4 x 3.1 cm. Mass effect on the fourth ventricles again seen and unchanged. The size and configuration of the prominent lateral third ventricles are again seen and unchanged.  Periventricular white matter lucency is again seen and unchanged. Evaluation of the osseous structures with the appropriate window appears unremarkable. The visualized paranasal sinuses mastoid and middle ear regions appear clear    CT Angio Neck w/ IV Cont (12.15.17) CT BRAIN: Acute intra-axial hemorrhage in the cerebellar vermis causing mass effect on the fourth ventricle. Mild prominence of the third and lateral ventricles. Heterogeneous calcified mass in the extracalvarial soft tissues of the right occipital bone.  CTA BRAIN: Patent intracranial circulation. No flow-limiting stenosis or occlusion.   CTA NECK: Patent cervical vasculature. No flow limiting stenosis or occlusion.   Cerebral Angiogram (12.20.17) : Preliminary report normal pial and dural surfaces. No aneurysms. No venous occlusions.

## 2017-12-24 NOTE — PROGRESS NOTE ADULT - ASSESSMENT
ASSESSMENT: This is a 57yman with one day of dizziness prior to admission. Patient presented to ED when the dizziness persisted and was found to have cerebellar hemorrhage and 4th ventricle IVH (Vermian Hemorrhage) for which he was transferred to [facility]. MRI shows multiple areas of micro-hemorrhage involving right frontal motor strip, right thalamic and left paramedian modesto, and right dorsal modesto, areas of restricted diffusion on left parietal/occipital and left frontal territory. Impression diagnosis: Non-traumatic vermian hemorrhage with IVH with multiple areas susceptibility at SWI compatible with hypertensive microangiopathy. Areas of restricted diffusion at Brain MRI with no clinical significance and in the setting of angiography commonly seen post-procedure. Angiography done 12/20/17 with no evidence of dural or pial AVM, or aneurysms.     NEURO: Neurologically improved vs admission, s/p cerebral angiography 12/20: demonstrated Normal pial and dural surfaces. No aneurysms. No venous occlusions, continue close monitoring for neurologic deterioration in setting of cerebral edema with mass effect and brain compression, continue 2% Saline in setting of hyponatremia and mass effect, goal of -155. as per neurosurgery eval: no acute intervention needed at this time, BP goal <140/90. LDL 93 no role of statin therapy in this setting. Plan to obtain repeat neuroimaging in 4-6 weeks to r/o underlying pathology. PMR for AR.     ANTITHROMBOTIC THERAPY:  No indication.     PULMONARY: Protecting airway, saturating well on room air.     CARDIOVASCULAR: TTE: unremarkable, Continue cardiac monitoring, slowly titrate anti-htn regimen accordingly. Will start lopressor 12.5 mg for BP management  BID                             SBP goal: 140-160    GASTROINTESTINAL: dysphagia screen passed. Tolerating diet      Diet: Regular. Consistent Carb.    RENAL: BUN/Cr without acute change, maintain adequate hydration, good urine output, hyponatremic started on 2% in setting of mass effect, monitor bmp Q6      Na Goal: Greater than 140     Bray: n        HEMATOLOGY: H/H  Platelets without acute change, no active bleeding,     DVT ppx: LMWH      ID: afebrile,  No leukocytosis , monitor for si/sx of infection and encourage incentive spirometry    Other:  small area of ecchymosis but not hematoma at puncture site with 2+ DP pulses.  Upon closer exam, aubrie rash noted on thighs and encroaching the right groin and abdomen, started on Cetirizine and benadryl, most likely allergic response to contrast.  Appreciate derm consult     DISPOSITION: [facility] rehab once stable and workup complete.      CORE MEASURES:        Admission NIHSS: No     TPA: [] YES [x] NO      LDL/HDL: NO     Depression Screen: pending     Statin Therapy: NO     Dysphagia Screen: [x] PASS [] FAIL     Smoking [] YES [x] NO      Afib [] YES [x] NO     Stroke Education [x] YES [] NO ASSESSMENT: This is a 57yman with one day of dizziness prior to admission. Patient presented to ED when the dizziness persisted and was found to have cerebellar hemorrhage and 4th ventricle IVH (Vermian Hemorrhage) for which he was transferred to [facility]. MRI shows multiple areas of micro-hemorrhage involving right frontal motor strip, right thalamic and left paramedian modesto, and right dorsal modesto, areas of restricted diffusion on left parietal/occipital and left frontal territory. Impression diagnosis: Non-traumatic vermian hemorrhage with IVH with multiple areas susceptibility at SWI compatible with hypertensive microangiopathy. Areas of restricted diffusion at Brain MRI with no clinical significance and in the setting of angiography commonly seen post-procedure. Angiography done 12/20/17 with no evidence of dural or pial AVM, or aneurysms.     NEURO: Neurologically improved vs admission, s/p cerebral angiography 12/20: demonstrated Normal pial and dural surfaces. No aneurysms. No venous occlusions, continue close monitoring for neurologic deterioration in setting of cerebral edema with mass effect and brain compression, D/C 2% IV Saline. As per neurosurgery eval: no acute intervention needed at this time, BP goal <140/90. LDL 93 no role of statin therapy in this setting. Plan to obtain repeat neuroimaging in 4-6 weeks to r/o underlying pathology. PMR for AR. As per discussion with pt via  phone ID# [phone]: pt's sister will arrive on 12/24 from [facility] and she will be able to provide 24hrs supervision    ANTITHROMBOTIC THERAPY:  No indication.     PULMONARY: Protecting airway, saturating well on room air.     CARDIOVASCULAR: TTE: unremarkable, Continue cardiac monitoring, slowly titrate anti-htn regimen accordingly. Will start lopressor 12.5 mg for BP management  BID                             SBP goal: 140-160    GASTROINTESTINAL: dysphagia screen passed. Tolerating diet      Diet: Regular. Consistent Carb.    RENAL: BUN/Cr without acute change, maintain adequate hydration, good urine output, hyponatremic started on 2% in setting of mass effect, monitor bmp Q6      Na Goal: Greater than 140     Bray: n        HEMATOLOGY: H/H  Platelets without acute change, no active bleeding,     DVT ppx: LMWH      ID: afebrile,  leukocytosis, will continue to monitor for si/sx of infection and encourage incentive spirometry, Urine Cx: NGTD    Other:  small area of ecchymosis but not hematoma at puncture site with 2+ DP pulses.  Upon closer exam, aubrie rash noted on thighs and encroaching the right groin and abdomen, continue Cetirizine and benadryl, most likely allergic response to contrast.  Appreciate derm consult     DISPOSITION: [facility] rehab vs home PT, once bed/authorization in place.      CORE MEASURES:        Admission NIHSS: No     TPA: [] YES [x] NO      LDL/HDL: NO     Depression Screen: pending     Statin Therapy: NO     Dysphagia Screen: [x] PASS [] FAIL     Smoking [] YES [x] NO      Afib [] YES [x] NO     Stroke Education [x] YES [] NO ASSESSMENT: This is a 57yman with one day of dizziness prior to admission. Patient presented to ED when the dizziness persisted and was found to have cerebellar hemorrhage and 4th ventricle IVH (Vermian Hemorrhage) for which he was transferred to [facility]. MRI shows multiple areas of micro-hemorrhage involving right frontal motor strip, right thalamic and left paramedian modesto, and right dorsal modesto, areas of restricted diffusion on left parietal/occipital and left frontal territory. Impression diagnosis: Non-traumatic vermian hemorrhage with IVH with multiple areas susceptibility at SWI compatible with hypertensive microangiopathy. Areas of restricted diffusion at Brain MRI with no clinical significance and in the setting of angiography commonly seen post-procedure. Angiography done 12/20/17 with no evidence of dural or pial AVM, or aneurysms.     NEURO: Neurologically improved vs admission, s/p cerebral angiography 12/20: demonstrated Normal pial and dural surfaces. No aneurysms. No venous occlusions, continue close monitoring for neurologic deterioration in setting of cerebral edema with mass effect and brain compression, D/C 2% IV Saline. As per neurosurgery eval: no acute intervention needed at this time, BP goal <140/90. LDL 93 no role of statin therapy in this setting. Plan to obtain repeat neuroimaging in 4-6 weeks to r/o underlying pathology. PMR for AR. As per discussion with pt via  phone ID# [phone]: pt's sister will arrive on 12/24 from [facility] and she will be able to provide 24hrs supervision    ANTITHROMBOTIC THERAPY:  No indication.     PULMONARY: Protecting airway, saturating well on room air.     CARDIOVASCULAR: TTE: unremarkable, Continue cardiac monitoring, slowly titrate anti-htn regimen accordingly. Will start lopressor 12.5 mg for BP management  BID                             SBP goal: 140-160    GASTROINTESTINAL: dysphagia screen passed. Tolerating diet      Diet: Regular. Consistent Carb.    RENAL: BUN/Cr without acute change, maintain adequate hydration, good urine output, hyponatremic started on 2% in setting of mass effect, monitor bmp Q6      Na Goal: Greater than 140     Bray: n        HEMATOLOGY: H/H  Platelets without acute change, no active bleeding,     DVT ppx: LMWH      ID: afebrile,  leukocytosis, will continue to monitor for si/sx of infection and encourage incentive spirometry, Urine Cx: NGTD    Other:  small area of ecchymosis but not hematoma at puncture site with 2+ DP pulses.  Upon closer exam, aubrie rash noted on thighs and encroaching the right groin and abdomen, continue Cetirizine and benadryl, most likely allergic response to contrast.  Appreciate derm consult     DISPOSITION: [facility] rehab vs home PT, once bed/authorization in place.      CORE MEASURES:        Admission NIHSS: No     TPA: NO      LDL/HDL: NO     Depression Screen: 0     Statin Therapy: NO     Dysphagia Screen: PASS     Smoking  NO      Afib  NO     Stroke Education YES ASSESSMENT: This is a 57yman with one day of dizziness prior to admission. Patient presented to ED when the dizziness persisted and was found to have cerebellar hemorrhage and 4th ventricle IVH (Vermian Hemorrhage) for which he was transferred to [facility]. MRI shows multiple areas of micro-hemorrhage involving right frontal motor strip, right thalamic and left paramedian modesto, and right dorsal modesto, areas of restricted diffusion on left parietal/occipital and left frontal territory. Impression diagnosis: Non-traumatic vermian hemorrhage with IVH with multiple areas susceptibility at SWI compatible with hypertensive microangiopathy. Areas of restricted diffusion at Brain MRI with no clinical significance and in the setting of angiography commonly seen post-procedure. Angiography done 12/20/17 with no evidence of dural or pial AVM, or aneurysms.     NEURO: Neurologically improved vs admission, s/p cerebral angiography 12/20: demonstrated Normal pial and dural surfaces. No aneurysms. No venous occlusions, continue close monitoring for neurologic deterioration in setting of cerebral edema with mass effect and brain compression, D/C 2% IV Saline. As per neurosurgery eval: no acute intervention needed at this time, BP goal <140/90. LDL 93 no role of statin therapy in this setting. Plan to obtain repeat neuroimaging in 4-6 weeks to r/o underlying pathology. PMR for AR. As per discussion with pt via  phone ID# [phone]: pt's sister will arrive on 12/24 from [facility] and she will be able to provide 24hrs supervision    ANTITHROMBOTIC THERAPY:  No indication.     PULMONARY: Protecting airway, saturating well on room air.     CARDIOVASCULAR: TTE: unremarkable, Continue cardiac monitoring, slowly titrate anti-htn regimen accordingly. Tolerating lopressor 12.5 mg for BP management  BID, added yesterday      SBP goal: 140-160    GASTROINTESTINAL: dysphagia screen passed. Tolerating diet      Diet: Regular. Consistent Carb.    RENAL: BUN/Cr without acute change, maintain adequate hydration, good urine output, hyponatremic 2% d/c, in setting of stable mass effect, no signs or symptoms of hematoma expansion     Na Goal: Greater than 140     Bray: n        HEMATOLOGY: H/H  Platelets without acute change, no active bleeding,     DVT ppx: LMWH      ID: afebrile,  leukocytosis, will continue to monitor no si/sx of infection and encourage incentive spirometry, Urine Cx: NGTD    Other:  small area of ecchymosis but not hematoma at puncture site with 2+ DP pulses.  Upon closer exam, aubrie rash noted on thighs and encroaching the right groin and abdomen, continue Cetirizine and benadryl, most likely allergic response to contrast.  Appreciate derm consult     DISPOSITION: [facility] rehab vs home PT, once bed/authorization in place.      CORE MEASURES:        Admission NIHSS: No     TPA: NO      LDL/HDL: NO     Depression Screen: 0     Statin Therapy: NO     Dysphagia Screen: PASS     Smoking  NO      Afib  NO     Stroke Education YES

## 2017-12-25 LAB
ANION GAP SERPL CALC-SCNC: 11 MMOL/L — SIGNIFICANT CHANGE UP (ref 5–17)
ANION GAP SERPL CALC-SCNC: 17 MMOL/L — SIGNIFICANT CHANGE UP (ref 5–17)
BUN SERPL-MCNC: 18 MG/DL — SIGNIFICANT CHANGE UP (ref 7–23)
BUN SERPL-MCNC: 18 MG/DL — SIGNIFICANT CHANGE UP (ref 7–23)
CALCIUM SERPL-MCNC: 8.8 MG/DL — SIGNIFICANT CHANGE UP (ref 8.4–10.5)
CALCIUM SERPL-MCNC: 9 MG/DL — SIGNIFICANT CHANGE UP (ref 8.4–10.5)
CHLORIDE SERPL-SCNC: 100 MMOL/L — SIGNIFICANT CHANGE UP (ref 96–108)
CHLORIDE SERPL-SCNC: 103 MMOL/L — SIGNIFICANT CHANGE UP (ref 96–108)
CO2 SERPL-SCNC: 21 MMOL/L — LOW (ref 22–31)
CO2 SERPL-SCNC: 25 MMOL/L — SIGNIFICANT CHANGE UP (ref 22–31)
CREAT SERPL-MCNC: 0.66 MG/DL — SIGNIFICANT CHANGE UP (ref 0.5–1.3)
CREAT SERPL-MCNC: 0.7 MG/DL — SIGNIFICANT CHANGE UP (ref 0.5–1.3)
GLUCOSE BLDC GLUCOMTR-MCNC: 164 MG/DL — HIGH (ref 70–99)
GLUCOSE BLDC GLUCOMTR-MCNC: 168 MG/DL — HIGH (ref 70–99)
GLUCOSE BLDC GLUCOMTR-MCNC: 191 MG/DL — HIGH (ref 70–99)
GLUCOSE BLDC GLUCOMTR-MCNC: 99 MG/DL — SIGNIFICANT CHANGE UP (ref 70–99)
GLUCOSE SERPL-MCNC: 103 MG/DL — HIGH (ref 70–99)
GLUCOSE SERPL-MCNC: 268 MG/DL — HIGH (ref 70–99)
HCT VFR BLD CALC: 47.2 % — SIGNIFICANT CHANGE UP (ref 39–50)
HGB BLD-MCNC: 16.2 G/DL — SIGNIFICANT CHANGE UP (ref 13–17)
MCHC RBC-ENTMCNC: 30.2 PG — SIGNIFICANT CHANGE UP (ref 27–34)
MCHC RBC-ENTMCNC: 34.3 GM/DL — SIGNIFICANT CHANGE UP (ref 32–36)
MCV RBC AUTO: 88.1 FL — SIGNIFICANT CHANGE UP (ref 80–100)
PLATELET # BLD AUTO: 256 K/UL — SIGNIFICANT CHANGE UP (ref 150–400)
POTASSIUM SERPL-MCNC: 3.8 MMOL/L — SIGNIFICANT CHANGE UP (ref 3.5–5.3)
POTASSIUM SERPL-MCNC: 4 MMOL/L — SIGNIFICANT CHANGE UP (ref 3.5–5.3)
POTASSIUM SERPL-SCNC: 3.8 MMOL/L — SIGNIFICANT CHANGE UP (ref 3.5–5.3)
POTASSIUM SERPL-SCNC: 4 MMOL/L — SIGNIFICANT CHANGE UP (ref 3.5–5.3)
RBC # BLD: 5.36 M/UL — SIGNIFICANT CHANGE UP (ref 4.2–5.8)
RBC # FLD: 14 % — SIGNIFICANT CHANGE UP (ref 10.3–14.5)
SODIUM SERPL-SCNC: 138 MMOL/L — SIGNIFICANT CHANGE UP (ref 135–145)
SODIUM SERPL-SCNC: 139 MMOL/L — SIGNIFICANT CHANGE UP (ref 135–145)
WBC # BLD: 11.99 K/UL — HIGH (ref 3.8–10.5)
WBC # FLD AUTO: 11.99 K/UL — HIGH (ref 3.8–10.5)

## 2017-12-25 PROCEDURE — 99232 SBSQ HOSP IP/OBS MODERATE 35: CPT

## 2017-12-25 PROCEDURE — 99233 SBSQ HOSP IP/OBS HIGH 50: CPT

## 2017-12-25 RX ADMIN — LORATADINE 10 MILLIGRAM(S): 10 TABLET ORAL at 06:19

## 2017-12-25 RX ADMIN — Medication 1 APPLICATION(S): at 06:19

## 2017-12-25 RX ADMIN — Medication 2: at 12:56

## 2017-12-25 RX ADMIN — Medication 25 MILLIGRAM(S): at 22:01

## 2017-12-25 RX ADMIN — LISINOPRIL 40 MILLIGRAM(S): 2.5 TABLET ORAL at 12:17

## 2017-12-25 RX ADMIN — Medication 650 MILLIGRAM(S): at 11:25

## 2017-12-25 RX ADMIN — Medication 100 MILLIGRAM(S): at 12:17

## 2017-12-25 RX ADMIN — Medication 12.5 MILLIGRAM(S): at 17:59

## 2017-12-25 RX ADMIN — HUMAN INSULIN 4 UNIT(S): 100 INJECTION, SUSPENSION SUBCUTANEOUS at 22:36

## 2017-12-25 RX ADMIN — Medication 650 MILLIGRAM(S): at 12:17

## 2017-12-25 RX ADMIN — AMLODIPINE BESYLATE 10 MILLIGRAM(S): 2.5 TABLET ORAL at 22:01

## 2017-12-25 RX ADMIN — Medication 12.5 MILLIGRAM(S): at 06:19

## 2017-12-25 RX ADMIN — ENOXAPARIN SODIUM 40 MILLIGRAM(S): 100 INJECTION SUBCUTANEOUS at 18:00

## 2017-12-25 RX ADMIN — Medication 2: at 22:36

## 2017-12-25 RX ADMIN — LORATADINE 10 MILLIGRAM(S): 10 TABLET ORAL at 17:59

## 2017-12-25 RX ADMIN — Medication 1 APPLICATION(S): at 18:34

## 2017-12-25 RX ADMIN — SENNA PLUS 2 TABLET(S): 8.6 TABLET ORAL at 22:01

## 2017-12-25 RX ADMIN — Medication 2: at 18:09

## 2017-12-25 RX ADMIN — HUMAN INSULIN 4 UNIT(S): 100 INJECTION, SUSPENSION SUBCUTANEOUS at 18:09

## 2017-12-25 RX ADMIN — HUMAN INSULIN 4 UNIT(S): 100 INJECTION, SUSPENSION SUBCUTANEOUS at 09:00

## 2017-12-25 RX ADMIN — HUMAN INSULIN 4 UNIT(S): 100 INJECTION, SUSPENSION SUBCUTANEOUS at 12:56

## 2017-12-25 NOTE — PROGRESS NOTE ADULT - SUBJECTIVE AND OBJECTIVE BOX
THE PATIENT WAS SEEN AND EXAMINED BY ME WITH THE HOUSESTAFF AND STROKE TEAM DURING MORNING ROUNDS.   HPI:  57 year old male pmhx of htn coming in with 1 day of dizziness prior to admission. Patient presented to ED when the dizziness persisted and was found to have cerebellar hemorrhage and 4th ventricle IVH (Vermian Hemorrhage) for which he was transferred to [facility].   Patient stated he takes no medication and has no pmhx but reportedly has hx of htn.Denies generalized weakness, and nausea. No fall or recent trauma. No numbness, tingling, weakness. No antiplatelets or anticoagulants. Patient currently denies and headache, n/v, blurry vision, double vision, numbness or weakness.     SUBJECTIVE: No events overnight.  No new neurologic complaints.      acetaminophen   Tablet 650 milliGRAM(s) Oral every 6 hours PRN  acetaminophen   Tablet. 650 milliGRAM(s) Oral every 6 hours PRN  amLODIPine   Tablet 10 milliGRAM(s) Oral daily  dextrose 5%. 1000 milliLiter(s) IV Continuous <Continuous>  dextrose 50% Injectable 12.5 Gram(s) IV Push once  dextrose 50% Injectable 25 Gram(s) IV Push once  dextrose 50% Injectable 25 Gram(s) IV Push once  dextrose Gel 1 Dose(s) Oral once PRN  diphenhydrAMINE   Capsule 25 milliGRAM(s) Oral at bedtime  docusate sodium 100 milliGRAM(s) Oral daily  enoxaparin Injectable 40 milliGRAM(s) SubCutaneous <User Schedule>  glucagon  Injectable 1 milliGRAM(s) IntraMuscular once PRN  influenza   Vaccine 0.5 milliLiter(s) IntraMuscular once  insulin lispro (HumaLOG) corrective regimen sliding scale   SubCutaneous Before meals and at bedtime  insulin NPH human recombinant 4 Unit(s) SubCutaneous Before meals and at bedtime  lisinopril 40 milliGRAM(s) Oral daily  loratadine 10 milliGRAM(s) Oral <User Schedule>  metoprolol     tartrate 12.5 milliGRAM(s) Oral two times a day  senna 2 Tablet(s) Oral at bedtime  triamcinolone 0.1% Ointment 1 Application(s) Topical two times a day      PHYSICAL EXAM:   Vital Signs Last 24 Hrs  T(C): 36.6 (25 Dec 2017 12:31), Max: 36.9 (24 Dec 2017 23:39)  T(F): 97.9 (25 Dec 2017 12:31), Max: 98.4 (24 Dec 2017 23:39)  HR: 75 (25 Dec 2017 12:31) (60 - 75)  BP: 149/86 (25 Dec 2017 13:24) (115/72 - 179/93)  BP(mean): --  RR: 18 (25 Dec 2017 12:31) (18 - 18)  SpO2: 98% (25 Dec 2017 12:31) (97% - 98%)    General: Resting in bed. No acute distress  HEENT: PERRL,  EOM intact, visual fields full  Abdomen: Soft, nontender, nondistended   Extremities:  No edema. Lacy rash on thighs and abdomen    NEUROLOGICAL EXAM:   Mental status: Eyes closed, open to name, alert, oriented x3. Able to follow simple commands. Speech is fluent.   Cranial Nerves: No facial asymmetry, no nystagmus, no dysarthria, tongue is midline.  Motor exam: Normal tone, no drift. Strength 5/5 throughout.  Sensation: Intact to light touch   Coordination/ Gait: Subtle LUE intention tremor    LABS:                        16.2   11.99 )-----------( 256      ( 25 Dec 2017 08:20 )             47.2    12-25    139  |  103  |  18  ----------------------------<  103<H>  4.0   |  25  |  0.70    Ca    8.8      25 Dec 2017 08:11      Hemoglobin A1C, Whole Blood: 6.7 % (12-17 @ 06:40)      IMAGING: Reviewed by me.  CT BRAIN (12/19/2017)  History: Follow-up parenchymal hemorrhage. ultiple axial sections were performed from base of skull to vertex without contrast enhancement. This exam is compared with prior noncontrast head CT performed on December 18, 2017. Parenchymal hemorrhage and surrounding edema is again seen in the   posterior fossa midline. This area. Or hemorrhage measures approximately .6 x 3.0 cm and previously measured approximately 3.4 x 3.1 cm. Mass effect on the fourth ventricles again seen and unchanged. The size and configuration of the prominent lateral third ventricles are again seen and unchanged.  Periventricular white matter lucency is again seen and unchanged. Evaluation of the osseous structures with the appropriate window appears unremarkable. The visualized paranasal sinuses mastoid and middle ear regions appear clear    CT Angio Neck w/ IV Cont (12.15.17) CT BRAIN: Acute intra-axial hemorrhage in the cerebellar vermis causing mass effect on the fourth ventricle. Mild prominence of the third and lateral ventricles. Heterogeneous calcified mass in the extracalvarial soft tissues of the right occipital bone.  CTA BRAIN: Patent intracranial circulation. No flow-limiting stenosis or occlusion.   CTA NECK: Patent cervical vasculature. No flow limiting stenosis or occlusion.   Cerebral Angiogram (12.20.17) : Preliminary report normal pial and dural surfaces. No aneurysms. No venous occlusions. THE PATIENT WAS SEEN AND EXAMINED BY ME WITH THE HOUSESTAFF AND STROKE TEAM DURING MORNING ROUNDS.   HPI:  57 year old male pmhx of htn coming in with 1 day of dizziness prior to admission. Patient presented to ED when the dizziness persisted and was found to have cerebellar hemorrhage and 4th ventricle IVH (Vermian Hemorrhage) for which he was transferred to [facility].   Patient stated he takes no medication and has no pmhx but reportedly has hx of htn.Denies generalized weakness, and nausea. No fall or recent trauma. No numbness, tingling, weakness. No antiplatelets or anticoagulants. Patient currently denies and headache, n/v, blurry vision, double vision, numbness or weakness.     SUBJECTIVE: No events overnight.  No new neurologic complaints.      acetaminophen   Tablet 650 milliGRAM(s) Oral every 6 hours PRN  acetaminophen   Tablet. 650 milliGRAM(s) Oral every 6 hours PRN  amLODIPine   Tablet 10 milliGRAM(s) Oral daily  dextrose 5%. 1000 milliLiter(s) IV Continuous <Continuous>  dextrose 50% Injectable 12.5 Gram(s) IV Push once  dextrose 50% Injectable 25 Gram(s) IV Push once  dextrose 50% Injectable 25 Gram(s) IV Push once  dextrose Gel 1 Dose(s) Oral once PRN  diphenhydrAMINE   Capsule 25 milliGRAM(s) Oral at bedtime  docusate sodium 100 milliGRAM(s) Oral daily  enoxaparin Injectable 40 milliGRAM(s) SubCutaneous <User Schedule>  glucagon  Injectable 1 milliGRAM(s) IntraMuscular once PRN  influenza   Vaccine 0.5 milliLiter(s) IntraMuscular once  insulin lispro (HumaLOG) corrective regimen sliding scale   SubCutaneous Before meals and at bedtime  insulin NPH human recombinant 4 Unit(s) SubCutaneous Before meals and at bedtime  lisinopril 40 milliGRAM(s) Oral daily  loratadine 10 milliGRAM(s) Oral <User Schedule>  metoprolol     tartrate 12.5 milliGRAM(s) Oral two times a day  senna 2 Tablet(s) Oral at bedtime  triamcinolone 0.1% Ointment 1 Application(s) Topical two times a day      PHYSICAL EXAM:   Vital Signs Last 24 Hrs  T(C): 36.6 (25 Dec 2017 12:31), Max: 36.9 (24 Dec 2017 23:39)  T(F): 97.9 (25 Dec 2017 12:31), Max: 98.4 (24 Dec 2017 23:39)  HR: 75 (25 Dec 2017 12:31) (60 - 75)  BP: 149/86 (25 Dec 2017 13:24) (115/72 - 179/93)  BP(mean): --  RR: 18 (25 Dec 2017 12:31) (18 - 18)  SpO2: 98% (25 Dec 2017 12:31) (97% - 98%)    General: Resting in bed. No acute distress  HEENT: PERRL,  EOM intact, visual fields full  Abdomen: Soft, nontender, nondistended   Extremities:  No edema. Lacy rash on thighs and abdomen    NEUROLOGICAL EXAM:   Mental status: Cantonese speaking, Eyes closed, open to name, alert, oriented x3. Able to follow simple commands. Speech is fluent.   Cranial Nerves: No facial asymmetry, no nystagmus, no dysarthria, tongue is midline.  Motor exam: Normal tone, no drift. Strength 5/5 throughout.  Sensation: Intact to light touch   Coordination/ Gait: Subtle LUE intention tremor    LABS:                        16.2   11.99 )-----------( 256      ( 25 Dec 2017 08:20 )             47.2    12-25    139  |  103  |  18  ----------------------------<  103<H>  4.0   |  25  |  0.70    Ca    8.8      25 Dec 2017 08:11      Hemoglobin A1C, Whole Blood: 6.7 % (12-17 @ 06:40)      IMAGING: Reviewed by me.  CT BRAIN (12/19/2017)  History: Follow-up parenchymal hemorrhage. Multiple axial sections were performed from base of skull to vertex without contrast enhancement. This exam is compared with prior noncontrast head CT performed on December 18, 2017. Parenchymal hemorrhage and surrounding edema is again seen in the   posterior fossa midline. This area. Or hemorrhage measures approximately .6 x 3.0 cm and previously measured approximately 3.4 x 3.1 cm. Mass effect on the fourth ventricles again seen and unchanged. The size and configuration of the prominent lateral third ventricles are again seen and unchanged.  Periventricular white matter lucency is again seen and unchanged. Evaluation of the osseous structures with the appropriate window appears unremarkable. The visualized paranasal sinuses mastoid and middle ear regions appear clear    CT Angio Neck w/ IV Cont (12.15.17) CT BRAIN: Acute intra-axial hemorrhage in the cerebellar vermis causing mass effect on the fourth ventricle. Mild prominence of the third and lateral ventricles. Heterogeneous calcified mass in the extracalvarial soft tissues of the right occipital bone.  CTA BRAIN: Patent intracranial circulation. No flow-limiting stenosis or occlusion.   CTA NECK: Patent cervical vasculature. No flow limiting stenosis or occlusion.   Cerebral Angiogram (12.20.17) : Preliminary report normal pial and dural surfaces. No aneurysms. No venous occlusions.

## 2017-12-25 NOTE — PROGRESS NOTE ADULT - ASSESSMENT
ASSESSMENT: This is a 57yman with one day of dizziness prior to admission. Patient presented to ED when the dizziness persisted and was found to have cerebellar hemorrhage and 4th ventricle IVH (Vermian Hemorrhage) for which he was transferred to [facility]. MRI shows multiple areas of micro-hemorrhage involving right frontal motor strip, right thalamic and left paramedian modesto, and right dorsal modesto, areas of restricted diffusion on left parietal/occipital and left frontal territory. Impression diagnosis: Non-traumatic vermian hemorrhage with IVH with multiple areas susceptibility at SWI compatible with hypertensive microangiopathy. Areas of restricted diffusion at Brain MRI with no clinical significance and in the setting of angiography commonly seen post-procedure. Angiography done 12/20/17 with no evidence of dural or pial AVM, or aneurysms.     NEURO: Neurologically improved vs admission, s/p cerebral angiography 12/20: demonstrated Normal pial and dural surfaces. No aneurysms. No venous occlusions, continue close monitoring for neurologic deterioration in setting of cerebral edema with mass effect and brain compression, D/C 2% IV Saline. As per neurosurgery eval: no acute intervention needed at this time, BP goal <140/90. LDL 93 no role of statin therapy in this setting. Plan to obtain repeat neuroimaging in 4-6 weeks to r/o underlying pathology. PMR for AR. As per discussion with pt via  phone ID# [phone]: pt's sister will arrive on 12/24 from [facility] and she will be able to provide 24hrs supervision    ANTITHROMBOTIC THERAPY:  No indication.     PULMONARY: Protecting airway, saturating well on room air.     CARDIOVASCULAR: TTE: unremarkable, Continue cardiac monitoring, slowly titrate anti-htn regimen accordingly. Tolerating lopressor 12.5 mg for BP management  BID, added yesterday      SBP goal: 140-160    GASTROINTESTINAL: dysphagia screen passed. Tolerating diet      Diet: Regular. Consistent Carb.    RENAL: BUN/Cr without acute change, maintain adequate hydration, good urine output, hyponatremic 2% d/c, in setting of stable mass effect, no signs or symptoms of hematoma expansion     Na Goal: Greater than 140     Bray: n        HEMATOLOGY: H/H  Platelets without acute change, no active bleeding,     DVT ppx: LMWH      ID: afebrile,  leukocytosis, will continue to monitor no si/sx of infection and encourage incentive spirometry, Urine Cx: NGTD    Other:  small area of ecchymosis but not hematoma at puncture site with 2+ DP pulses.  Upon closer exam, aubrie rash noted on thighs and encroaching the right groin and abdomen, continue Cetirizine and benadryl, most likely allergic response to contrast.  Appreciate derm consult     DISPOSITION: [facility] rehab vs home PT, once bed/authorization in place.      CORE MEASURES:        Admission NIHSS: No     TPA: NO      LDL/HDL: NO     Depression Screen: 0     Statin Therapy: NO     Dysphagia Screen: PASS     Smoking  NO      Afib  NO     Stroke Education YES ASSESSMENT: This is a 57yman with one day of dizziness prior to admission. Patient presented to ED when the dizziness persisted and was found to have cerebellar hemorrhage and 4th ventricle IVH (Vermian Hemorrhage) for which he was transferred to [facility]. MRI shows multiple areas of micro-hemorrhage involving right frontal motor strip, right thalamic and left paramedian modesto, and right dorsal modesto, areas of restricted diffusion on left parietal/occipital and left frontal territory. Impression diagnosis: Non-traumatic vermian hemorrhage with IVH with multiple areas susceptibility at SWI compatible with hypertensive microangiopathy. Areas of restricted diffusion at Brain MRI with no clinical significance and in the setting of angiography commonly seen post-procedure. Angiography done 12/20/17 with no evidence of dural or pial AVM, or aneurysms.     NEURO: Neurologically improved vs admission, s/p cerebral angiography 12/20: demonstrated Normal pial and dural surfaces. No aneurysms. No venous occlusions, continue close monitoring for neurologic deterioration in setting of cerebral edema with mass effect and brain compression, D/C 2% IV Saline. As per neurosurgery eval: no acute intervention needed at this time, BP goal <140/90. LDL 93 no role of statin therapy in this setting. Plan to obtain repeat neuroimaging in 4-6 weeks to r/o underlying pathology. PMR for AR. As per discussion with pt via  phone ID# [phone]: pt's sister will arrive on 12/24 from [facility] and she will be able to provide 24hrs supervision    ANTITHROMBOTIC THERAPY:  No indication.     PULMONARY: Protecting airway, saturating well on room air.     CARDIOVASCULAR: TTE: unremarkable, Continue cardiac monitoring, slowly titrate anti-htn regimen accordingly. Tolerating lopressor 12.5 mg for BP management  BID, added yesterday      SBP goal: 140-160    GASTROINTESTINAL: dysphagia screen passed. Tolerating diet      Diet: Regular. Consistent Carb.    RENAL: BUN/Cr without acute change, maintain adequate hydration, good urine output, 2% d/c, in setting of stable mass effect, no signs or symptoms of hematoma expansion     Na Goal: Greater than 140     Bray: n        HEMATOLOGY: H/H  Platelets without acute change, no active bleeding,     DVT ppx: LMWH      ID: afebrile,  leukocytosis, will continue to monitor no si/sx of infection and encourage incentive spirometry, Urine Cx: NGTD    Other:  small area of ecchymosis but not hematoma at puncture site with 2+ DP pulses.  Upon closer exam, aubrie rash noted on thighs and encroaching the right groin and abdomen, continue Cetirizine and benadryl, most likely allergic response to contrast.  Appreciate derm consult     DISPOSITION: [facility] rehab vs home PT, once bed/authorization in place.      CORE MEASURES:        Admission NIHSS: No     TPA: NO      LDL/HDL: NO     Depression Screen: 0     Statin Therapy: NO     Dysphagia Screen: PASS     Smoking  NO      Afib  NO     Stroke Education YES

## 2017-12-26 LAB
ANION GAP SERPL CALC-SCNC: 14 MMOL/L — SIGNIFICANT CHANGE UP (ref 5–17)
BUN SERPL-MCNC: 18 MG/DL — SIGNIFICANT CHANGE UP (ref 7–23)
CALCIUM SERPL-MCNC: 9.1 MG/DL — SIGNIFICANT CHANGE UP (ref 8.4–10.5)
CHLORIDE SERPL-SCNC: 98 MMOL/L — SIGNIFICANT CHANGE UP (ref 96–108)
CO2 SERPL-SCNC: 25 MMOL/L — SIGNIFICANT CHANGE UP (ref 22–31)
CREAT SERPL-MCNC: 0.78 MG/DL — SIGNIFICANT CHANGE UP (ref 0.5–1.3)
GLUCOSE BLDC GLUCOMTR-MCNC: 119 MG/DL — HIGH (ref 70–99)
GLUCOSE BLDC GLUCOMTR-MCNC: 135 MG/DL — HIGH (ref 70–99)
GLUCOSE BLDC GLUCOMTR-MCNC: 201 MG/DL — HIGH (ref 70–99)
GLUCOSE BLDC GLUCOMTR-MCNC: 211 MG/DL — HIGH (ref 70–99)
GLUCOSE SERPL-MCNC: 100 MG/DL — HIGH (ref 70–99)
HCT VFR BLD CALC: 48.8 % — SIGNIFICANT CHANGE UP (ref 39–50)
HGB BLD-MCNC: 16.7 G/DL — SIGNIFICANT CHANGE UP (ref 13–17)
MCHC RBC-ENTMCNC: 29.7 PG — SIGNIFICANT CHANGE UP (ref 27–34)
MCHC RBC-ENTMCNC: 34.2 GM/DL — SIGNIFICANT CHANGE UP (ref 32–36)
MCV RBC AUTO: 86.8 FL — SIGNIFICANT CHANGE UP (ref 80–100)
PLATELET # BLD AUTO: 276 K/UL — SIGNIFICANT CHANGE UP (ref 150–400)
POTASSIUM SERPL-MCNC: 3.7 MMOL/L — SIGNIFICANT CHANGE UP (ref 3.5–5.3)
POTASSIUM SERPL-SCNC: 3.7 MMOL/L — SIGNIFICANT CHANGE UP (ref 3.5–5.3)
RBC # BLD: 5.62 M/UL — SIGNIFICANT CHANGE UP (ref 4.2–5.8)
RBC # FLD: 14 % — SIGNIFICANT CHANGE UP (ref 10.3–14.5)
SODIUM SERPL-SCNC: 137 MMOL/L — SIGNIFICANT CHANGE UP (ref 135–145)
WBC # BLD: 11.51 K/UL — HIGH (ref 3.8–10.5)
WBC # FLD AUTO: 11.51 K/UL — HIGH (ref 3.8–10.5)

## 2017-12-26 PROCEDURE — 99232 SBSQ HOSP IP/OBS MODERATE 35: CPT

## 2017-12-26 PROCEDURE — 99233 SBSQ HOSP IP/OBS HIGH 50: CPT

## 2017-12-26 RX ORDER — AMLODIPINE BESYLATE 2.5 MG/1
1 TABLET ORAL
Qty: 30 | Refills: 0 | OUTPATIENT
Start: 2017-12-26 | End: 2018-01-24

## 2017-12-26 RX ORDER — METFORMIN HYDROCHLORIDE 850 MG/1
1 TABLET ORAL
Qty: 60 | Refills: 0 | OUTPATIENT
Start: 2017-12-26 | End: 2018-01-24

## 2017-12-26 RX ORDER — METOPROLOL TARTRATE 50 MG
0.5 TABLET ORAL
Qty: 30 | Refills: 0 | OUTPATIENT
Start: 2017-12-26 | End: 2018-01-24

## 2017-12-26 RX ORDER — DOCUSATE SODIUM 100 MG
1 CAPSULE ORAL
Qty: 30 | Refills: 0 | OUTPATIENT
Start: 2017-12-26 | End: 2018-01-24

## 2017-12-26 RX ORDER — LISINOPRIL 2.5 MG/1
1 TABLET ORAL
Qty: 30 | Refills: 0 | OUTPATIENT
Start: 2017-12-26 | End: 2018-01-24

## 2017-12-26 RX ORDER — SENNA PLUS 8.6 MG/1
2 TABLET ORAL
Qty: 60 | Refills: 0 | OUTPATIENT
Start: 2017-12-26 | End: 2018-01-24

## 2017-12-26 RX ORDER — ATORVASTATIN CALCIUM 80 MG/1
1 TABLET, FILM COATED ORAL
Qty: 30 | Refills: 0 | OUTPATIENT
Start: 2017-12-26 | End: 2018-01-24

## 2017-12-26 RX ORDER — HUMAN INSULIN 100 [IU]/ML
4 INJECTION, SUSPENSION SUBCUTANEOUS
Qty: 1 | Refills: 0 | OUTPATIENT
Start: 2017-12-26 | End: 2018-01-24

## 2017-12-26 RX ORDER — ATORVASTATIN CALCIUM 80 MG/1
20 TABLET, FILM COATED ORAL AT BEDTIME
Qty: 0 | Refills: 0 | Status: DISCONTINUED | OUTPATIENT
Start: 2017-12-26 | End: 2018-01-07

## 2017-12-26 RX ADMIN — HUMAN INSULIN 4 UNIT(S): 100 INJECTION, SUSPENSION SUBCUTANEOUS at 08:43

## 2017-12-26 RX ADMIN — ENOXAPARIN SODIUM 40 MILLIGRAM(S): 100 INJECTION SUBCUTANEOUS at 17:39

## 2017-12-26 RX ADMIN — HUMAN INSULIN 4 UNIT(S): 100 INJECTION, SUSPENSION SUBCUTANEOUS at 17:57

## 2017-12-26 RX ADMIN — Medication 25 MILLIGRAM(S): at 22:10

## 2017-12-26 RX ADMIN — Medication 4: at 12:53

## 2017-12-26 RX ADMIN — Medication 4: at 17:57

## 2017-12-26 RX ADMIN — ATORVASTATIN CALCIUM 20 MILLIGRAM(S): 80 TABLET, FILM COATED ORAL at 22:11

## 2017-12-26 RX ADMIN — AMLODIPINE BESYLATE 10 MILLIGRAM(S): 2.5 TABLET ORAL at 22:10

## 2017-12-26 RX ADMIN — HUMAN INSULIN 4 UNIT(S): 100 INJECTION, SUSPENSION SUBCUTANEOUS at 22:10

## 2017-12-26 RX ADMIN — Medication 1 APPLICATION(S): at 17:39

## 2017-12-26 RX ADMIN — HUMAN INSULIN 4 UNIT(S): 100 INJECTION, SUSPENSION SUBCUTANEOUS at 12:53

## 2017-12-26 RX ADMIN — Medication 12.5 MILLIGRAM(S): at 05:53

## 2017-12-26 RX ADMIN — Medication 100 MILLIGRAM(S): at 12:52

## 2017-12-26 RX ADMIN — SENNA PLUS 2 TABLET(S): 8.6 TABLET ORAL at 22:10

## 2017-12-26 RX ADMIN — LORATADINE 10 MILLIGRAM(S): 10 TABLET ORAL at 17:39

## 2017-12-26 RX ADMIN — Medication 1 APPLICATION(S): at 07:07

## 2017-12-26 RX ADMIN — LORATADINE 10 MILLIGRAM(S): 10 TABLET ORAL at 05:53

## 2017-12-26 RX ADMIN — Medication 12.5 MILLIGRAM(S): at 17:39

## 2017-12-26 RX ADMIN — LISINOPRIL 40 MILLIGRAM(S): 2.5 TABLET ORAL at 12:52

## 2017-12-26 NOTE — DIETITIAN INITIAL EVALUATION ADULT. - SOURCE
other (specify)/comprehensive chart review other (specify)/comprehensive chart review, RN, Mandarin  Regin ID#[id number]

## 2017-12-26 NOTE — DIETITIAN INITIAL EVALUATION ADULT. - NS AS NUTRI INTERV ED CONTENT
Discussed balanced meal pattern, monitoring portion sizes, sources of carbohydrates, including protein at each meal and snack, and limiting concentrated sweets./Priority modifications/Recommended modifications/Purpose of the nutrition education/Nutrition relationship to health/disease

## 2017-12-26 NOTE — DIETITIAN INITIAL EVALUATION ADULT. - ORAL INTAKE PTA
Usual diet recall: Breakfast- 1 package of ramen noodles with 2 eggs. Lunch- sandwich with meat. Dinner- brown or white rice or noddles with chicken/pork/fish, green vegetables and soup. Rarely snacks but if he does it would be a piece of fruit. Drinks tea without adding anything, does not drink soda but occasionally drinks juice./good

## 2017-12-26 NOTE — DIETITIAN INITIAL EVALUATION ADULT. - OTHER INFO
Pt seen for length of stay. Pt seen for length of stay. Pt with no history of diabetes, HbA1c this admission was 6.7%. Pt verbalized minimal knowledge of dietary and lifestyle recommendations for diabetes and ? knowledge of diagnosis, Pt was asking how to administer insulin- d/w team. Pt denies any GI distress and chewing/swallowing difficulties. Reports he is 165cm tall and usually weighs 90lbs without any recent changes, ? if Pt meant 190lbs or 90kg as current Wt is 194.8lbs.

## 2017-12-26 NOTE — PROGRESS NOTE ADULT - ASSESSMENT
ASSESSMENT: This is a 57yman with one day of dizziness prior to admission. Patient presented to ED when the dizziness persisted and was found to have cerebellar hemorrhage and 4th ventricle IVH (Vermian Hemorrhage) for which he was transferred to [facility]. MRI shows multiple areas of micro-hemorrhage involving right frontal motor strip, right thalamic and left paramedian modesto, and right dorsal modesto, areas of restricted diffusion on left parietal/occipital and left frontal territory. Impression diagnosis: Non-traumatic vermian hemorrhage with IVH with multiple areas susceptibility at SWI compatible with hypertensive microangiopathy. Areas of restricted diffusion at Brain MRI with no clinical significance and in the setting of angiography commonly seen post-procedure. Angiography done 12/20/17 with no evidence of dural or pial AVM, or aneurysms.     NEURO: Neurologically improved vs admission, s/p cerebral angiography 12/20: demonstrated Normal pial and dural surfaces. No aneurysms. No venous occlusions, continue close monitoring for neurologic deterioration in setting of cerebral edema with mass effect and brain compression, D/C 2% IV Saline. As per neurosurgery eval: no acute intervention needed at this time, BP goal <140/90. LDL 93 no role of statin therapy in this setting. Plan to obtain repeat neuroimaging in 4-6 weeks to r/o underlying pathology. PMR for AR. As per discussion with pt via  phone ID# [phone]: pt's sister will arrive on 12/24 from [facility] and she will be able to provide 24hrs supervision    ANTITHROMBOTIC THERAPY:  No indication.     PULMONARY: Protecting airway, saturating well on room air.     CARDIOVASCULAR: TTE: unremarkable, Continue cardiac monitoring, slowly titrate anti-htn regimen accordingly. Tolerating lopressor 12.5 mg for BP management  BID, added yesterday      SBP goal: 140-160    GASTROINTESTINAL: dysphagia screen passed. Tolerating diet      Diet: Regular. Consistent Carb.    RENAL: BUN/Cr without acute change, maintain adequate hydration, good urine output, 2% d/c, in setting of stable mass effect, no signs or symptoms of hematoma expansion     Na Goal: Greater than 140     Bray: n        HEMATOLOGY: H/H  Platelets without acute change, no active bleeding,     DVT ppx: LMWH      ID: afebrile,  leukocytosis, will continue to monitor no si/sx of infection and encourage incentive spirometry, Urine Cx: NGTD    Other:  small area of ecchymosis but not hematoma at puncture site with 2+ DP pulses.  Upon closer exam, aubrie rash noted on thighs and encroaching the right groin and abdomen, continue Cetirizine and benadryl, most likely allergic response to contrast.  Appreciate derm consult     DISPOSITION: [facility] rehab vs home PT, once bed/authorization in place.      CORE MEASURES:        Admission NIHSS: No     TPA: NO      LDL/HDL: NO     Depression Screen: 0     Statin Therapy: NO     Dysphagia Screen: PASS     Smoking  NO      Afib  NO     Stroke Education YES ASSESSMENT:   58 Y/O man with vascular risk factors of age and hypertension reports to have noticed one day of dizziness prior to admission. Patient presented to ED when the dizziness persisted and was found to have cerebellar hemorrhage and 4th ventricle IVH (Vermian Hemorrhage) for which he was transferred to [facility]. MRI brain showed stable paramedian/vermian cerebellar ICH as well as evidence of multiple old microhemorrhages predominantly affecting deep  territories and moderate leukoaraiosis and punctate areas of restriction diffusion involving left frontal and left parieto-occipital region. CTA head on admission as well as Conventional angiogram performed subsequently did not show any evidence of vascular malformation being the etiology of his ICH. 	    Impression:  Nontraumatic cerebellar ICH. Paramedian/vermian cerebellar intracerebral hemorrhage with associated IVH - likely etiology being microangiopathy related to chronic hypertension leading to hypertensive intracerebral hemorrhage   Small punctate area is of diffusion restriction involving the left frontal and left parietal occipital region - likely etiology being small punctate areas of restriction diffusion associated with intracerebral hemorrhage, as described in the literature   Associated cerebral edema with mass effect and brain compression leading to effacement of the fourth ventricle without any significant hydrocephalus    NEURO: Neurologically improved vs admission, s/p cerebral angiography 12/20: demonstrated Normal pial and dural surfaces. No aneurysms. No venous occlusions, continue close monitoring for neurologic deterioration in setting of cerebral edema with mass effect and brain compression, D/C 2% saline. As per neurosurgery eval: no acute intervention needed at this time, BP goal <140/90. Atorvastatin 20 mg at bedtime considering likely newly diagnosed diabetes and 10 years ASCVD risk being 10.4%, Plan to obtain repeat neuroimaging in 4-6 weeks to r/o underlying pathology. PMR for AR.     ANTITHROMBOTIC THERAPY: None in the setting of ICH and no specific indications at this time    PULMONARY: Protecting airway, saturating well on room air.     CARDIOVASCULAR: TTE: unremarkable and no evidence of LVH, Continue cardiac monitoring, slowly titrate antihypertensive regimen accordingly.      SBP goal: Gradual normotension     GASTROINTESTINAL: dysphagia screen passed. Tolerating diet      Diet: Regular. Consistent Carb.    RENAL: BUN/Cr without acute change, maintain adequate hydration, good urine output, 2% d/c, in setting of stable mass effect, no signs or symptoms of hematoma expansion     Na Goal: Greater than 140     Bray: n        HEMATOLOGY: H/H  Platelets without acute change, no active bleeding,     DVT ppx: LMWH      ID: afebrile,  leukocytosis, will continue to monitor no si/sx of infection and encourage incentive spirometry, Urine Cx: NGTD    Other:  small area of ecchymosis but not hematoma at puncture site with 2+ DP pulses.  Upon closer exam, aubrie rash noted on thighs and encroaching the right groin and abdomen, continue Cetirizine and benadryl, most likely allergic response to contrast.  Appreciate derm consult     DISPOSITION: [facility] rehab vs home PT, once bed/authorization in place.      CORE MEASURES:        Admission NIHSS: No     TPA: NO      LDL/HDL: NO     Depression Screen: 0     Statin Therapy: NO     Dysphagia Screen: PASS     Smoking  NO      Afib  NO     Stroke Education YES

## 2017-12-26 NOTE — DIETITIAN INITIAL EVALUATION ADULT. - NS AS NUTRI INTERV COLLABORAT
Discussed concerns with stroke team, recommend Endocrine consult./Referral to other providers/Collaboration with other providers

## 2017-12-26 NOTE — PROGRESS NOTE ADULT - SUBJECTIVE AND OBJECTIVE BOX
THE PATIENT WAS SEEN AND EXAMINED BY ME WITH THE HOUSESTAFF AND STROKE TEAM DURING MORNING ROUNDS.   HPI:  57 year old male pmhx of htn coming in with 1 day of dizziness prior to admission. 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   Patient stated he takes no medication and has no pmhx but reportedly has hx of htn.Denies generalized weakness, and nausea. No fall or recent trauma. No numbness, tingling, weakness. No antiplatelets or anticoagulants. Patient currently denies and headache, n/v, blurry vision, double vision, numbness or weakness.     SUBJECTIVE: No events overnight.  No new neurologic complaints.      acetaminophen   Tablet 650 milliGRAM(s) Oral every 6 hours PRN  acetaminophen   Tablet. 650 milliGRAM(s) Oral every 6 hours PRN  amLODIPine   Tablet 10 milliGRAM(s) Oral daily  dextrose 50% Injectable 12.5 Gram(s) IV Push once  dextrose 50% Injectable 25 Gram(s) IV Push once  dextrose 50% Injectable 25 Gram(s) IV Push once  dextrose Gel 1 Dose(s) Oral once PRN  diphenhydrAMINE   Capsule 25 milliGRAM(s) Oral at bedtime  docusate sodium 100 milliGRAM(s) Oral daily  enoxaparin Injectable 40 milliGRAM(s) SubCutaneous <User Schedule>  glucagon  Injectable 1 milliGRAM(s) IntraMuscular once PRN  influenza   Vaccine 0.5 milliLiter(s) IntraMuscular once  insulin lispro (HumaLOG) corrective regimen sliding scale   SubCutaneous Before meals and at bedtime  insulin NPH human recombinant 4 Unit(s) SubCutaneous Before meals and at bedtime  lisinopril 40 milliGRAM(s) Oral daily  loratadine 10 milliGRAM(s) Oral <User Schedule>  metoprolol     tartrate 12.5 milliGRAM(s) Oral two times a day  senna 2 Tablet(s) Oral at bedtime  triamcinolone 0.1% Ointment 1 Application(s) Topical two times a day      PHYSICAL EXAM:   Vital Signs Last 24 Hrs  T(C): 36.8 (26 Dec 2017 08:00), Max: 36.9 (25 Dec 2017 23:40)  T(F): 98.2 (26 Dec 2017 08:00), Max: 98.5 (25 Dec 2017 23:40)  HR: 73 (26 Dec 2017 08:00) (64 - 77)  BP: 122/76 (26 Dec 2017 08:00) (122/76 - 172/91)  BP(mean): --  RR: 18 (26 Dec 2017 08:00) (16 - 18)  SpO2: 97% (26 Dec 2017 08:00) (97% - 98%)    General: Resting in bed. No acute distress  HEENT: PERRL,  EOM intact, visual fields full  Abdomen: Soft, nontender, nondistended   Extremities:  No edema. Lacy rash on thighs and abdomen    NEUROLOGICAL EXAM:   Mental status: Cantonese speaking, Eyes closed, open to name, alert, oriented x3. Able to follow simple commands. Speech is fluent.   Cranial Nerves: No facial asymmetry, no nystagmus, no dysarthria, tongue is midline.  Motor exam: Normal tone, no drift. Strength 5/5 throughout.  Sensation: Intact to light touch   Coordination/ Gait: Subtle LUE intention tremor    LABS:                        16.2   11.99 )-----------( 256      ( 25 Dec 2017 08:20 )             47.2    12-25    138  |  100  |  18  ----------------------------<  268<H>  3.8   |  21<L>  |  0.66    Ca    9.0      25 Dec 2017 17:06      Hemoglobin A1C, Whole Blood: 6.7 % (12-17 @ 06:40)      IMAGING: Reviewed by me.     CT BRAIN (12/19/2017)  History: Follow-up parenchymal hemorrhage. Multiple axial sections were performed from base of skull to vertex without contrast enhancement. This exam is compared with prior noncontrast head CT performed on December 18, 2017. Parenchymal hemorrhage and surrounding edema is again seen in the   posterior fossa midline. This area. Or hemorrhage measures approximately .6 x 3.0 cm and previously measured approximately 3.4 x 3.1 cm. Mass effect on the fourth ventricles again seen and unchanged. The size and configuration of the prominent lateral third ventricles are again seen and unchanged.  Periventricular white matter lucency is again seen and unchanged. Evaluation of the osseous structures with the appropriate window appears unremarkable. The visualized paranasal sinuses mastoid and middle ear regions appear clear    CT Angio Neck w/ IV Cont (12.15.17) CT BRAIN: Acute intra-axial hemorrhage in the cerebellar vermis causing mass effect on the fourth ventricle. Mild prominence of the third and lateral ventricles. Heterogeneous calcified mass in the extracalvarial soft tissues of the right occipital bone.  CTA BRAIN: Patent intracranial circulation. No flow-limiting stenosis or occlusion.   CTA NECK: Patent cervical vasculature. No flow limiting stenosis or occlusion.   Cerebral Angiogram (12.20.17) : Preliminary report normal pial and dural surfaces. No aneurysms. No venous occlusions. THE PATIENT WAS SEEN AND EXAMINED BY ME WITH THE HOUSESTAFF AND STROKE TEAM DURING MORNING ROUNDS.   HPI:  57 year old male pmhx of htn coming in with 1 day of dizziness prior to admission. 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   Patient stated he takes no medication and has no pmhx but reportedly has hx of htn.Denies generalized weakness, and nausea. No fall or recent trauma. No numbness, tingling, weakness. No antiplatelets or anticoagulants. Patient currently denies and headache, n/v, blurry vision, double vision, numbness or weakness.     SUBJECTIVE: No events overnight.  No new neurologic complaints.      acetaminophen   Tablet 650 milliGRAM(s) Oral every 6 hours PRN  acetaminophen   Tablet. 650 milliGRAM(s) Oral every 6 hours PRN  amLODIPine   Tablet 10 milliGRAM(s) Oral daily  dextrose 50% Injectable 12.5 Gram(s) IV Push once  dextrose 50% Injectable 25 Gram(s) IV Push once  dextrose 50% Injectable 25 Gram(s) IV Push once  dextrose Gel 1 Dose(s) Oral once PRN  diphenhydrAMINE   Capsule 25 milliGRAM(s) Oral at bedtime  docusate sodium 100 milliGRAM(s) Oral daily  enoxaparin Injectable 40 milliGRAM(s) SubCutaneous <User Schedule>  glucagon  Injectable 1 milliGRAM(s) IntraMuscular once PRN  influenza   Vaccine 0.5 milliLiter(s) IntraMuscular once  insulin lispro (HumaLOG) corrective regimen sliding scale   SubCutaneous Before meals and at bedtime  insulin NPH human recombinant 4 Unit(s) SubCutaneous Before meals and at bedtime  lisinopril 40 milliGRAM(s) Oral daily  loratadine 10 milliGRAM(s) Oral <User Schedule>  metoprolol     tartrate 12.5 milliGRAM(s) Oral two times a day  senna 2 Tablet(s) Oral at bedtime  triamcinolone 0.1% Ointment 1 Application(s) Topical two times a day      PHYSICAL EXAM:   Vital Signs Last 24 Hrs  T(C): 36.8 (26 Dec 2017 08:00), Max: 36.9 (25 Dec 2017 23:40)  T(F): 98.2 (26 Dec 2017 08:00), Max: 98.5 (25 Dec 2017 23:40)  HR: 73 (26 Dec 2017 08:00) (64 - 77)  BP: 122/76 (26 Dec 2017 08:00) (122/76 - 172/91)  BP(mean): --  RR: 18 (26 Dec 2017 08:00) (16 - 18)  SpO2: 97% (26 Dec 2017 08:00) (97% - 98%)    General: Resting in bed. No acute distress  HEENT: PERRL,  EOM intact, visual fields full    NEUROLOGICAL EXAM:   Mental status: Cantonese speaking, alert, oriented x3. Able to follow simple commands. Speech is fluent.   Cranial Nerves: Left facial droop, no nystagmus, no dysarthria, tongue is midline.  Motor exam: Normal tone, no drift. Strength 5/5 throughout.  Sensation: Intact to light touch   Coordination/ Gait: Subtle LUE ataxia    LABS:                        16.2   11.99 )-----------( 256      ( 25 Dec 2017 08:20 )             47.2    12-25    138  |  100  |  18  ----------------------------<  268<H>  3.8   |  21<L>  |  0.66    Ca    9.0      25 Dec 2017 17:06      Hemoglobin A1C, Whole Blood: 6.7 % (12-17 @ 06:40)      IMAGING: Reviewed by me.     CT BRAIN (12/19/2017)  History: Follow-up parenchymal hemorrhage. Multiple axial sections were performed from base of skull to vertex without contrast enhancement. This exam is compared with prior noncontrast head CT performed on December 18, 2017. Parenchymal hemorrhage and surrounding edema is again seen in the   posterior fossa midline. This area. Or hemorrhage measures approximately .6 x 3.0 cm and previously measured approximately 3.4 x 3.1 cm. Mass effect on the fourth ventricles again seen and unchanged. The size and configuration of the prominent lateral third ventricles are again seen and unchanged.  Periventricular white matter lucency is again seen and unchanged. Evaluation of the osseous structures with the appropriate window appears unremarkable. The visualized paranasal sinuses mastoid and middle ear regions appear clear    CT Angio Neck w/ IV Cont (12.15.17) CT BRAIN: Acute intra-axial hemorrhage in the cerebellar vermis causing mass effect on the fourth ventricle. Mild prominence of the third and lateral ventricles. Heterogeneous calcified mass in the extracalvarial soft tissues of the right occipital bone.  CTA BRAIN: Patent intracranial circulation. No flow-limiting stenosis or occlusion.   CTA NECK: Patent cervical vasculature. No flow limiting stenosis or occlusion.   Cerebral Angiogram (12.20.17) : Preliminary report normal pial and dural surfaces. No aneurysms. No venous occlusions. THE PATIENT WAS SEEN AND EXAMINED BY ME WITH THE HOUSESTAFF AND STROKE TEAM DURING MORNING ROUNDS.     HPI:  57 year old male pmhx of htn coming in with 1 day of dizziness prior to admission. 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.   Patient stated he takes no medication and has no pmhx but reportedly has hx of htn.Denies generalized weakness, and nausea. No fall or recent trauma. No numbness, tingling, weakness. No antiplatelets or anticoagulants. Patient currently denies and headache, n/v, blurry vision, double vision, numbness or weakness.     SUBJECTIVE: No events overnight.  No new neurologic complaints.      acetaminophen   Tablet 650 milliGRAM(s) Oral every 6 hours PRN  acetaminophen   Tablet. 650 milliGRAM(s) Oral every 6 hours PRN  amLODIPine   Tablet 10 milliGRAM(s) Oral daily  dextrose 50% Injectable 12.5 Gram(s) IV Push once  dextrose 50% Injectable 25 Gram(s) IV Push once  dextrose 50% Injectable 25 Gram(s) IV Push once  dextrose Gel 1 Dose(s) Oral once PRN  diphenhydrAMINE   Capsule 25 milliGRAM(s) Oral at bedtime  docusate sodium 100 milliGRAM(s) Oral daily  enoxaparin Injectable 40 milliGRAM(s) SubCutaneous <User Schedule>  glucagon  Injectable 1 milliGRAM(s) IntraMuscular once PRN  influenza   Vaccine 0.5 milliLiter(s) IntraMuscular once  insulin lispro (HumaLOG) corrective regimen sliding scale   SubCutaneous Before meals and at bedtime  insulin NPH human recombinant 4 Unit(s) SubCutaneous Before meals and at bedtime  lisinopril 40 milliGRAM(s) Oral daily  loratadine 10 milliGRAM(s) Oral <User Schedule>  metoprolol     tartrate 12.5 milliGRAM(s) Oral two times a day  senna 2 Tablet(s) Oral at bedtime  triamcinolone 0.1% Ointment 1 Application(s) Topical two times a day    PHYSICAL EXAM:   Vital Signs Last 24 Hrs  T(C): 36.8 (26 Dec 2017 08:00), Max: 36.9 (25 Dec 2017 23:40)  T(F): 98.2 (26 Dec 2017 08:00), Max: 98.5 (25 Dec 2017 23:40)  HR: 73 (26 Dec 2017 08:00) (64 - 77)  BP: 122/76 (26 Dec 2017 08:00) (122/76 - 172/91)  BP(mean): --  RR: 18 (26 Dec 2017 08:00) (16 - 18)  SpO2: 97% (26 Dec 2017 08:00) (97% - 98%)    General: Resting in bed. No acute distress  HEENT: PERRL,  EOM intact, visual fields full    NEUROLOGICAL EXAM:   Mental status: Cantonese speaking, alert, oriented x3. Able to follow simple commands. Speech is fluent.   Cranial Nerves: Left facial droop, no nystagmus, no dysarthria, tongue is midline.  Motor exam: Normal tone, no drift. Strength 5/5 throughout.  Sensation: Intact to light touch   Coordination/ Gait: Bilateral ataxia/dysmetria, worse on the left    LABS:                        16.2   11.99 )-----------( 256      ( 25 Dec 2017 08:20 )             47.2    12-25    138  |  100  |  18  ----------------------------<  268<H>  3.8   |  21<L>  |  0.66    Ca    9.0      25 Dec 2017 17:06      Hemoglobin A1C, Whole Blood: 6.7 % (12-17 @ 06:40)      IMAGING: Reviewed by me.     CT BRAIN (12/19/2017)  History: Follow-up parenchymal hemorrhage. Multiple axial sections were performed from base of skull to vertex without contrast enhancement. This exam is compared with prior noncontrast head CT performed on December 18, 2017. Parenchymal hemorrhage and surrounding edema is again seen in the   posterior fossa midline. This area. Or hemorrhage measures approximately .6 x 3.0 cm and previously measured approximately 3.4 x 3.1 cm. Mass effect on the fourth ventricles again seen and unchanged. The size and configuration of the prominent lateral third ventricles are again seen and unchanged.  Periventricular white matter lucency is again seen and unchanged. Evaluation of the osseous structures with the appropriate window appears unremarkable. The visualized paranasal sinuses mastoid and middle ear regions appear clear    CT Angio Neck w/ IV Cont (12.15.17) CT BRAIN: Acute intra-axial hemorrhage in the cerebellar vermis causing mass effect on the fourth ventricle. Mild prominence of the third and lateral ventricles. Heterogeneous calcified mass in the extracalvarial soft tissues of the right occipital bone.  CTA BRAIN: Patent intracranial circulation. No flow-limiting stenosis or occlusion.   CTA NECK: Patent cervical vasculature. No flow limiting stenosis or occlusion.   Cerebral Angiogram (12.20.17) : Preliminary report normal pial and dural surfaces. No aneurysms. No venous occlusions.

## 2017-12-26 NOTE — DIETITIAN INITIAL EVALUATION ADULT. - ENERGY NEEDS
ht:  inches, wt:  pounds, BMI:  kg/m2, IBW:  pounds (+/- 10%),  %IBW  Edema:  Skin:  Other pertinent information: Pt presented c 1 day of dizziness, found to have cerebellar hemorrhage and 4th ventricle IVH. s/p cerebral angiography 12/20: demonstrated Normal pial and dural surfaces. No aneurysms. No venous occlusions ht: 65 inches, dosing wt: 194.8 pounds, BMI: 32.4  kg/m2, IBW: 136 pounds (+/- 10%), 143 %IBW  Edema: none noted. Skin: intact.  Other pertinent information: Pt presented c 1 day of dizziness, found to have cerebellar hemorrhage and 4th ventricle IVH. s/p cerebral angiography 12/20: demonstrated Normal pial and dural surfaces. No aneurysms. No venous occlusions

## 2017-12-27 LAB
GLUCOSE BLDC GLUCOMTR-MCNC: 107 MG/DL — HIGH (ref 70–99)
GLUCOSE BLDC GLUCOMTR-MCNC: 122 MG/DL — HIGH (ref 70–99)
GLUCOSE BLDC GLUCOMTR-MCNC: 139 MG/DL — HIGH (ref 70–99)

## 2017-12-27 PROCEDURE — 99233 SBSQ HOSP IP/OBS HIGH 50: CPT

## 2017-12-27 RX ADMIN — HUMAN INSULIN 4 UNIT(S): 100 INJECTION, SUSPENSION SUBCUTANEOUS at 08:30

## 2017-12-27 RX ADMIN — AMLODIPINE BESYLATE 10 MILLIGRAM(S): 2.5 TABLET ORAL at 22:27

## 2017-12-27 RX ADMIN — ENOXAPARIN SODIUM 40 MILLIGRAM(S): 100 INJECTION SUBCUTANEOUS at 19:05

## 2017-12-27 RX ADMIN — ATORVASTATIN CALCIUM 20 MILLIGRAM(S): 80 TABLET, FILM COATED ORAL at 22:27

## 2017-12-27 RX ADMIN — Medication 1 APPLICATION(S): at 05:15

## 2017-12-27 RX ADMIN — Medication 650 MILLIGRAM(S): at 14:31

## 2017-12-27 RX ADMIN — SENNA PLUS 2 TABLET(S): 8.6 TABLET ORAL at 22:28

## 2017-12-27 RX ADMIN — HUMAN INSULIN 4 UNIT(S): 100 INJECTION, SUSPENSION SUBCUTANEOUS at 16:00

## 2017-12-27 RX ADMIN — LISINOPRIL 40 MILLIGRAM(S): 2.5 TABLET ORAL at 11:52

## 2017-12-27 RX ADMIN — LORATADINE 10 MILLIGRAM(S): 10 TABLET ORAL at 05:15

## 2017-12-27 RX ADMIN — HUMAN INSULIN 4 UNIT(S): 100 INJECTION, SUSPENSION SUBCUTANEOUS at 22:27

## 2017-12-27 RX ADMIN — Medication 100 MILLIGRAM(S): at 11:51

## 2017-12-27 RX ADMIN — Medication 12.5 MILLIGRAM(S): at 19:07

## 2017-12-27 RX ADMIN — Medication 1 APPLICATION(S): at 19:06

## 2017-12-27 RX ADMIN — Medication 4: at 11:51

## 2017-12-27 RX ADMIN — Medication 12.5 MILLIGRAM(S): at 05:15

## 2017-12-27 RX ADMIN — HUMAN INSULIN 4 UNIT(S): 100 INJECTION, SUSPENSION SUBCUTANEOUS at 14:27

## 2017-12-27 NOTE — PROGRESS NOTE ADULT - ASSESSMENT
ASSESSMENT:   58 Y/O man with vascular risk factors of age and hypertension reports to have noticed one day of dizziness prior to admission. Patient presented to ED when the dizziness persisted and was found to have cerebellar hemorrhage and 4th ventricle IVH (Vermian Hemorrhage) for which he was transferred to [facility]. MRI brain showed stable paramedian/vermian cerebellar ICH as well as evidence of multiple old microhemorrhages predominantly affecting deep  territories and moderate leukoaraiosis and punctate areas of restriction diffusion involving left frontal and left parieto-occipital region. CTA head on admission as well as Conventional angiogram performed subsequently did not show any evidence of vascular malformation being the etiology of his ICH. 	    Impression:  Nontraumatic cerebellar ICH. Paramedian/vermian cerebellar intracerebral hemorrhage with associated IVH - likely etiology being microangiopathy related to chronic hypertension leading to hypertensive intracerebral hemorrhage   Small punctate area is of diffusion restriction involving the left frontal and left parietal occipital region - likely etiology being small punctate areas of restriction diffusion associated with intracerebral hemorrhage, as described in the literature   Associated cerebral edema with mass effect and brain compression leading to effacement of the fourth ventricle without any significant hydrocephalus    NEURO: Neurologically improved vs admission, s/p cerebral angiography 12/20: demonstrated Normal pial and dural surfaces. No aneurysms. No venous occlusions, continue close monitoring for neurologic deterioration in setting of cerebral edema with mass effect and brain compression, D/C 2% saline. As per neurosurgery eval: no acute intervention needed at this time, BP goal <140/90. Atorvastatin 20 mg at bedtime considering likely newly diagnosed diabetes and 10 years ASCVD risk being 10.4%, Plan to obtain repeat neuroimaging in 4-6 weeks to r/o underlying pathology. PMR for AR.     ANTITHROMBOTIC THERAPY: None in the setting of ICH and no specific indications at this time    PULMONARY: Protecting airway, saturating well on room air.     CARDIOVASCULAR: TTE: unremarkable and no evidence of LVH, Continue cardiac monitoring, slowly titrate antihypertensive regimen accordingly.      SBP goal: Gradual normotension     GASTROINTESTINAL: dysphagia screen passed. Tolerating diet      Diet: Regular. Consistent Carb.    RENAL: BUN/Cr without acute change, maintain adequate hydration, good urine output, 2% d/c, in setting of stable mass effect, no signs or symptoms of hematoma expansion     Na Goal: Greater than 140     Bray: n        HEMATOLOGY: H/H  Platelets without acute change, no active bleeding,     DVT ppx: LMWH      ID: afebrile,  leukocytosis, will continue to monitor no si/sx of infection and encourage incentive spirometry, Urine Cx: NGTD    Other:  small area of ecchymosis but not hematoma at puncture site with 2+ DP pulses.  Upon closer exam, aubrie rash noted on thighs and encroaching the right groin and abdomen, continue Cetirizine and benadryl, most likely allergic response to contrast.  Appreciate derm consult     DISPOSITION: [facility] rehab vs home PT, once bed/authorization in place.      CORE MEASURES:        Admission NIHSS: No     TPA: NO      LDL/HDL: NO     Depression Screen: 0     Statin Therapy: NO     Dysphagia Screen: PASS     Smoking  NO      Afib  NO     Stroke Education YES ASSESSMENT:   58 Y/O man with vascular risk factors of age and hypertension reports to have noticed one day of dizziness prior to admission. Patient presented to ED when the dizziness persisted and was found to have cerebellar hemorrhage and 4th ventricle IVH (Vermian Hemorrhage) for which he was transferred to [facility]. MRI brain showed stable paramedian/vermian cerebellar ICH as well as evidence of multiple old microhemorrhages predominantly affecting deep  territories and moderate leukoaraiosis and punctate areas of restriction diffusion involving left frontal and left parieto-occipital region. CTA head on admission as well as Conventional angiogram performed subsequently did not show any evidence of vascular malformation being the etiology of his ICH. 	    Impression:  Nontraumatic cerebellar ICH. Paramedian/vermian cerebellar intracerebral hemorrhage with associated IVH - likely etiology being microangiopathy related to chronic hypertension leading to hypertensive intracerebral hemorrhage   Small punctate area is of diffusion restriction involving the left frontal and left parietal occipital region - likely etiology being small punctate areas of restriction diffusion associated with intracerebral hemorrhage, as described in the literature   Associated cerebral edema with mass effect and brain compression leading to effacement of the fourth ventricle without any significant hydrocephalus    NEURO: Neurologically improved vs admission, s/p cerebral angiography 12/20: demonstrated Normal pial and dural surfaces. No aneurysms. No venous occlusions BP goal <140/90. Atorvastatin 20 mg at bedtime considering likely newly diagnosed diabetes and 10 years ASCVD risk being 10.4%, Plan to obtain repeat neuroimaging in 4-6 weeks to r/o underlying pathology. PMR for AR.     ANTITHROMBOTIC THERAPY: None in the setting of ICH and no specific indications at this time    PULMONARY: Protecting airway, saturating well on room air.     CARDIOVASCULAR: TTE: unremarkable and no evidence of LVH, Continue cardiac monitoring, slowly titrate antihypertensive regimen accordingly.      SBP goal: Gradual normotension     GASTROINTESTINAL: dysphagia screen passed. Tolerating diet      Diet: Regular. Consistent Carb.    RENAL: BUN/Cr without acute change, maintain adequate hydration, good urine output, 2% d/c, in setting of stable mass effect, no signs or symptoms of hematoma expansion     Na Goal: Greater than 140     Bray: n        HEMATOLOGY: H/H  Platelets without acute change, no active bleeding,     DVT ppx: LMWH      ID: afebrile,  leukocytosis trending down, continue encourage incentive spirometry at home., Urine Cx: NGTD    Other:  small area of ecchymosis but not hematoma at puncture site with 2+ DP pulses.  Upon closer exam, aubrie rash noted on thighs and encroaching the right groin and abdomen, continue Cetirizine and benadryl, most likely allergic response to contrast.  Appreciate derm consult     DISPOSITION: Discharge to home with family today.      CORE MEASURES:        Admission NIHSS: No     TPA: NO      LDL/HDL: NO     Depression Screen: 0     Statin Therapy: NO     Dysphagia Screen: PASS     Smoking  NO      Afib  NO     Stroke Education YES ASSESSMENT:   58 Y/O man with vascular risk factors of age and hypertension reports to have noticed one day of dizziness prior to admission. 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. MRI brain showed stable paramedian/vermian cerebellar ICH as well as evidence of multiple old microhemorrhages predominantly affecting deep  territories and moderate leukoaraiosis and punctate areas of restriction diffusion involving left frontal and left parieto-occipital region. CTA head on admission as well as Conventional angiogram performed subsequently did not show any evidence of vascular malformation being the etiology of his ICH.     Impression:  Nontraumatic cerebellar ICH. Paramedian/vermian cerebellar intracerebral hemorrhage with associated IVH - likely etiology being microangiopathy related to chronic hypertension leading to hypertensive intracerebral hemorrhage   Small punctate area is of diffusion restriction involving the left frontal and left parietal occipital region - likely etiology being small punctate areas of restriction diffusion associated with intracerebral hemorrhage, as described in the literature   Associated cerebral edema with mass effect and brain compression leading to effacement of the fourth ventricle without any significant hydrocephalus    NEURO: Neurologically improved vs admission, s/p cerebral angiography 12/20: demonstrated normal pial and dural surfaces. No aneurysms. No venous occlusions BP goal - gradual normotension. Atorvastatin 20 mg at bedtime considering likely newly diagnosed diabetes and 10 years ASCVD risk being 10.4%, Plan to obtain repeat neuroimaging in 4-6 weeks to r/o underlying pathology. PMR for AR.     ANTITHROMBOTIC THERAPY: None in the setting of ICH and no specific indications at this time    PULMONARY: Protecting airway, saturating well on room air.     CARDIOVASCULAR: TTE: unremarkable and no evidence of LVH, Continue cardiac monitoring, slowly titrate antihypertensive regimen accordingly.      SBP goal: Gradual normotension     GASTROINTESTINAL: dysphagia screen passed. Tolerating diet      Diet: Regular. Consistent Carb.    RENAL: BUN/Cr without acute change, maintain adequate hydration, good urine output, 2% NaCl D/Uche in setting of stable mass effect, no signs or symptoms of hematoma expansion     Na Goal: Greater than 140     Bray: n        HEMATOLOGY: H/H  Platelets without acute change, no active bleeding,     DVT ppx: LMWH      ID: afebrile,  leukocytosis trending down, continue encourage incentive spirometry at home., Urine Cx: NGTD    Other: Small area of ecchymosis but not hematoma at puncture site with 2+ DP pulses.  Upon closer exam, aubrie rash noted on thighs and encroaching the right groin and abdomen, continue Cetirizine and benadryl, most likely allergic response to contrast.  Appreciate derm consult     DISPOSITION: Discharge to home with family today.      CORE MEASURES:        Admission NIHSS: No     TPA: NO      LDL/HDL: NO     Depression Screen: 0     Statin Therapy: NO     Dysphagia Screen: PASS     Smoking  NO      Afib  NO     Stroke Education YES

## 2017-12-27 NOTE — PROGRESS NOTE ADULT - ATTENDING COMMENTS
agree with above; ROS otherwise negative
ROS: All negative except documented above
agree with above; ROS otherwise negative

## 2017-12-27 NOTE — PROGRESS NOTE ADULT - SUBJECTIVE AND OBJECTIVE BOX
THE PATIENT WAS SEEN AND EXAMINED BY ME WITH THE HOUSESTAFF AND STROKE TEAM DURING MORNING ROUNDS.   HPI:  57 year old male pmhx of htn coming in with 1 day of dizziness prior to admission. Patient presented to ED when the dizziness persisted and was found to have cerebellar hemorrhage and 4th ventricle IVH (Vermian Hemorrhage) for which he was transferred to [facility].   Patient stated he takes no medication and has no pmhx but reportedly has hx of htn.Denies generalized weakness, and nausea. No fall or recent trauma. No numbness, tingling, weakness. No antiplatelets or anticoagulants. Patient currently denies and headache, n/v, blurry vision, double vision, numbness or weakness.     SUBJECTIVE: No events overnight.  No new neurologic complaints.      acetaminophen   Tablet 650 milliGRAM(s) Oral every 6 hours PRN  acetaminophen   Tablet. 650 milliGRAM(s) Oral every 6 hours PRN  amLODIPine   Tablet 10 milliGRAM(s) Oral daily  atorvastatin 20 milliGRAM(s) Oral at bedtime  dextrose 50% Injectable 12.5 Gram(s) IV Push once  dextrose 50% Injectable 25 Gram(s) IV Push once  dextrose 50% Injectable 25 Gram(s) IV Push once  dextrose Gel 1 Dose(s) Oral once PRN  diphenhydrAMINE   Capsule 25 milliGRAM(s) Oral at bedtime  docusate sodium 100 milliGRAM(s) Oral daily  enoxaparin Injectable 40 milliGRAM(s) SubCutaneous <User Schedule>  glucagon  Injectable 1 milliGRAM(s) IntraMuscular once PRN  influenza   Vaccine 0.5 milliLiter(s) IntraMuscular once  insulin lispro (HumaLOG) corrective regimen sliding scale   SubCutaneous Before meals and at bedtime  insulin NPH human recombinant 4 Unit(s) SubCutaneous Before meals and at bedtime  lisinopril 40 milliGRAM(s) Oral daily  loratadine 10 milliGRAM(s) Oral <User Schedule>  metoprolol     tartrate 12.5 milliGRAM(s) Oral two times a day  senna 2 Tablet(s) Oral at bedtime  triamcinolone 0.1% Ointment 1 Application(s) Topical two times a day      PHYSICAL EXAM:   Vital Signs Last 24 Hrs  T(C): 36.9 (27 Dec 2017 04:20), Max: 36.9 (26 Dec 2017 23:38)  T(F): 98.4 (27 Dec 2017 04:20), Max: 98.4 (26 Dec 2017 23:38)  HR: 71 (27 Dec 2017 04:20) (57 - 80)  BP: 157/99 (27 Dec 2017 04:20) (129/75 - 164/90)  BP(mean): --  RR: 18 (27 Dec 2017 04:20) (18 - 18)  SpO2: 94% (27 Dec 2017 04:20) (94% - 98%)    General: Resting in bed. No acute distress  HEENT: PERRL,  EOM intact, visual fields full    NEUROLOGICAL EXAM:   Mental status: Cantonese speaking, alert, oriented x3. Able to follow simple commands. Speech is fluent.   Cranial Nerves: Left facial droop, no nystagmus, no dysarthria, tongue is midline.  Motor exam: Normal tone, no drift. Strength 5/5 throughout.  Sensation: Intact to light touch   Coordination/ Gait: Bilateral ataxia/dysmetria, worse on the left    LABS:                        16.7   11.51 )-----------( 276      ( 26 Dec 2017 09:21 )             48.8    12-26    137  |  98  |  18  ----------------------------<  100<H>  3.7   |  25  |  0.78    Ca    9.1      26 Dec 2017 09:11      Hemoglobin A1C, Whole Blood: 6.7 % (12-17 @ 06:40)      IMAGING: Reviewed by me.   CT BRAIN (12/19/2017)  History: Follow-up parenchymal hemorrhage. Multiple axial sections were performed from base of skull to vertex without contrast enhancement. This exam is compared with prior noncontrast head CT performed on December 18, 2017. Parenchymal hemorrhage and surrounding edema is again seen in the   posterior fossa midline. This area. Or hemorrhage measures approximately .6 x 3.0 cm and previously measured approximately 3.4 x 3.1 cm. Mass effect on the fourth ventricles again seen and unchanged. The size and configuration of the prominent lateral third ventricles are again seen and unchanged.  Periventricular white matter lucency is again seen and unchanged. Evaluation of the osseous structures with the appropriate window appears unremarkable. The visualized paranasal sinuses mastoid and middle ear regions appear clear    CT Angio Neck w/ IV Cont (12.15.17) CT BRAIN: Acute intra-axial hemorrhage in the cerebellar vermis causing mass effect on the fourth ventricle. Mild prominence of the third and lateral ventricles. Heterogeneous calcified mass in the extracalvarial soft tissues of the right occipital bone.  CTA BRAIN: Patent intracranial circulation. No flow-limiting stenosis or occlusion.   CTA NECK: Patent cervical vasculature. No flow limiting stenosis or occlusion.   Cerebral Angiogram (12.20.17) : Preliminary report normal pial and dural surfaces. No aneurysms. No venous occlusions. THE PATIENT WAS SEEN AND EXAMINED BY ME WITH THE HOUSESTAFF AND STROKE TEAM DURING MORNING ROUNDS.     HPI:  57 year old male pmhx of htn coming in with 1 day of dizziness prior to admission. Patient presented to ED when the dizziness persisted and was found to have cerebellar hemorrhage and 4th ventricle IVH (Vermian Hemorrhage) for which he was transferred to [facility].   Patient stated he takes no medication and has no pmhx but reportedly has hx of htn.Denies generalized weakness, and nausea. No fall or recent trauma. No numbness, tingling, weakness. No antiplatelets or anticoagulants. Patient currently denies and headache, n/v, blurry vision, double vision, numbness or weakness.     SUBJECTIVE: No events overnight.  No new neurologic complaints.      acetaminophen   Tablet 650 milliGRAM(s) Oral every 6 hours PRN  acetaminophen   Tablet. 650 milliGRAM(s) Oral every 6 hours PRN  amLODIPine   Tablet 10 milliGRAM(s) Oral daily  atorvastatin 20 milliGRAM(s) Oral at bedtime  dextrose 50% Injectable 12.5 Gram(s) IV Push once  dextrose 50% Injectable 25 Gram(s) IV Push once  dextrose 50% Injectable 25 Gram(s) IV Push once  dextrose Gel 1 Dose(s) Oral once PRN  diphenhydrAMINE   Capsule 25 milliGRAM(s) Oral at bedtime  docusate sodium 100 milliGRAM(s) Oral daily  enoxaparin Injectable 40 milliGRAM(s) SubCutaneous <User Schedule>  glucagon  Injectable 1 milliGRAM(s) IntraMuscular once PRN  influenza   Vaccine 0.5 milliLiter(s) IntraMuscular once  insulin lispro (HumaLOG) corrective regimen sliding scale   SubCutaneous Before meals and at bedtime  insulin NPH human recombinant 4 Unit(s) SubCutaneous Before meals and at bedtime  lisinopril 40 milliGRAM(s) Oral daily  loratadine 10 milliGRAM(s) Oral <User Schedule>  metoprolol     tartrate 12.5 milliGRAM(s) Oral two times a day  senna 2 Tablet(s) Oral at bedtime  triamcinolone 0.1% Ointment 1 Application(s) Topical two times a day      PHYSICAL EXAM:   Vital Signs Last 24 Hrs  T(C): 36.9 (27 Dec 2017 04:20), Max: 36.9 (26 Dec 2017 23:38)  T(F): 98.4 (27 Dec 2017 04:20), Max: 98.4 (26 Dec 2017 23:38)  HR: 71 (27 Dec 2017 04:20) (57 - 80)  BP: 157/99 (27 Dec 2017 04:20) (129/75 - 164/90)  BP(mean): --  RR: 18 (27 Dec 2017 04:20) (18 - 18)  SpO2: 94% (27 Dec 2017 04:20) (94% - 98%)    General: Resting in bed. No acute distress  HEENT: PERRL,  EOM intact, visual fields full    NEUROLOGICAL EXAM:   Mental status: Cantonese speaking, alert, oriented x3. Able to follow simple commands. Speech is fluent.   Cranial Nerves: Left facial droop, no nystagmus, no dysarthria, tongue is midline.  Motor exam: Normal tone, no drift. Strength 5/5 throughout.  Sensation: Intact to light touch   Coordination/ Gait: Bilateral ataxia/dysmetria, worse on the left    LABS:                        16.7   11.51 )-----------( 276      ( 26 Dec 2017 09:21 )             48.8    12-26    137  |  98  |  18  ----------------------------<  100<H>  3.7   |  25  |  0.78    Ca    9.1      26 Dec 2017 09:11      Hemoglobin A1C, Whole Blood: 6.7 % (12-17 @ 06:40)      IMAGING: Reviewed by me.   CT BRAIN (12/19/2017)  History: Follow-up parenchymal hemorrhage. Multiple axial sections were performed from base of skull to vertex without contrast enhancement. This exam is compared with prior noncontrast head CT performed on December 18, 2017. Parenchymal hemorrhage and surrounding edema is again seen in the   posterior fossa midline. This area. Or hemorrhage measures approximately .6 x 3.0 cm and previously measured approximately 3.4 x 3.1 cm. Mass effect on the fourth ventricles again seen and unchanged. The size and configuration of the prominent lateral third ventricles are again seen and unchanged.  Periventricular white matter lucency is again seen and unchanged. Evaluation of the osseous structures with the appropriate window appears unremarkable. The visualized paranasal sinuses mastoid and middle ear regions appear clear    CT Angio Neck w/ IV Cont (12.15.17) CT BRAIN: Acute intra-axial hemorrhage in the cerebellar vermis causing mass effect on the fourth ventricle. Mild prominence of the third and lateral ventricles. Heterogeneous calcified mass in the extracalvarial soft tissues of the right occipital bone.  CTA BRAIN: Patent intracranial circulation. No flow-limiting stenosis or occlusion.   CTA NECK: Patent cervical vasculature. No flow limiting stenosis or occlusion.   Cerebral Angiogram (12.20.17) : Preliminary report normal pial and dural surfaces. No aneurysms. No venous occlusions.

## 2017-12-28 LAB
GLUCOSE BLDC GLUCOMTR-MCNC: 110 MG/DL — HIGH (ref 70–99)
GLUCOSE BLDC GLUCOMTR-MCNC: 114 MG/DL — HIGH (ref 70–99)
GLUCOSE BLDC GLUCOMTR-MCNC: 132 MG/DL — HIGH (ref 70–99)
GLUCOSE BLDC GLUCOMTR-MCNC: 207 MG/DL — HIGH (ref 70–99)

## 2017-12-28 PROCEDURE — 70450 CT HEAD/BRAIN W/O DYE: CPT | Mod: 26

## 2017-12-28 PROCEDURE — 99233 SBSQ HOSP IP/OBS HIGH 50: CPT

## 2017-12-28 RX ORDER — METOPROLOL TARTRATE 50 MG
25 TABLET ORAL DAILY
Qty: 0 | Refills: 0 | Status: DISCONTINUED | OUTPATIENT
Start: 2017-12-28 | End: 2017-12-28

## 2017-12-28 RX ORDER — METOPROLOL TARTRATE 50 MG
12.5 TABLET ORAL DAILY
Qty: 0 | Refills: 0 | Status: DISCONTINUED | OUTPATIENT
Start: 2017-12-28 | End: 2017-12-29

## 2017-12-28 RX ADMIN — ENOXAPARIN SODIUM 40 MILLIGRAM(S): 100 INJECTION SUBCUTANEOUS at 17:34

## 2017-12-28 RX ADMIN — HUMAN INSULIN 4 UNIT(S): 100 INJECTION, SUSPENSION SUBCUTANEOUS at 21:44

## 2017-12-28 RX ADMIN — Medication 12.5 MILLIGRAM(S): at 05:24

## 2017-12-28 RX ADMIN — HUMAN INSULIN 4 UNIT(S): 100 INJECTION, SUSPENSION SUBCUTANEOUS at 12:51

## 2017-12-28 RX ADMIN — SENNA PLUS 2 TABLET(S): 8.6 TABLET ORAL at 21:44

## 2017-12-28 RX ADMIN — AMLODIPINE BESYLATE 10 MILLIGRAM(S): 2.5 TABLET ORAL at 21:44

## 2017-12-28 RX ADMIN — Medication 12.5 MILLIGRAM(S): at 15:25

## 2017-12-28 RX ADMIN — HUMAN INSULIN 4 UNIT(S): 100 INJECTION, SUSPENSION SUBCUTANEOUS at 09:32

## 2017-12-28 RX ADMIN — Medication 100 MILLIGRAM(S): at 11:27

## 2017-12-28 RX ADMIN — LISINOPRIL 40 MILLIGRAM(S): 2.5 TABLET ORAL at 11:27

## 2017-12-28 RX ADMIN — ATORVASTATIN CALCIUM 20 MILLIGRAM(S): 80 TABLET, FILM COATED ORAL at 21:43

## 2017-12-28 RX ADMIN — Medication 4: at 17:29

## 2017-12-28 RX ADMIN — Medication 1 APPLICATION(S): at 05:24

## 2017-12-28 RX ADMIN — HUMAN INSULIN 4 UNIT(S): 100 INJECTION, SUSPENSION SUBCUTANEOUS at 17:29

## 2017-12-28 NOTE — PROGRESS NOTE ADULT - SUBJECTIVE AND OBJECTIVE BOX
THE PATIENT WAS SEEN AND EXAMINED BY ME WITH THE HOUSESTAFF AND STROKE TEAM DURING MORNING ROUNDS.   HPI:  57 year old male pmhx of htn coming in with 1 day of dizziness prior to admission. Patient presented to ED when the dizziness persisted and was found to have cerebellar hemorrhage and 4th ventricle IVH (Vermian Hemorrhage) for which he was transferred to [facility].   Patient stated he takes no medication and has no pmhx but reportedly has hx of htn.Denies generalized weakness, and nausea. No fall or recent trauma. No numbness, tingling, weakness. No antiplatelets or anticoagulants. Patient currently denies and headache, n/v, blurry vision, double vision, numbness or weakness.       SUBJECTIVE: No events overnight.  No new neurologic complaints.      acetaminophen   Tablet 650 milliGRAM(s) Oral every 6 hours PRN  acetaminophen   Tablet. 650 milliGRAM(s) Oral every 6 hours PRN  amLODIPine   Tablet 10 milliGRAM(s) Oral daily  atorvastatin 20 milliGRAM(s) Oral at bedtime  dextrose 50% Injectable 12.5 Gram(s) IV Push once  dextrose 50% Injectable 25 Gram(s) IV Push once  dextrose 50% Injectable 25 Gram(s) IV Push once  dextrose Gel 1 Dose(s) Oral once PRN  docusate sodium 100 milliGRAM(s) Oral daily  enoxaparin Injectable 40 milliGRAM(s) SubCutaneous <User Schedule>  glucagon  Injectable 1 milliGRAM(s) IntraMuscular once PRN  influenza   Vaccine 0.5 milliLiter(s) IntraMuscular once  insulin lispro (HumaLOG) corrective regimen sliding scale   SubCutaneous Before meals and at bedtime  insulin NPH human recombinant 4 Unit(s) SubCutaneous Before meals and at bedtime  lisinopril 40 milliGRAM(s) Oral daily  metoprolol     tartrate 12.5 milliGRAM(s) Oral two times a day  senna 2 Tablet(s) Oral at bedtime  triamcinolone 0.1% Ointment 1 Application(s) Topical two times a day      PHYSICAL EXAM:   Vital Signs Last 24 Hrs  T(C): 36.7 (28 Dec 2017 07:59), Max: 36.9 (28 Dec 2017 00:00)  T(F): 98.1 (28 Dec 2017 07:59), Max: 98.4 (28 Dec 2017 00:00)  HR: 65 (28 Dec 2017 07:59) (62 - 84)  BP: 167/92 (28 Dec 2017 07:59) (150/95 - 168/98)  BP(mean): --  RR: 17 (28 Dec 2017 07:59) (17 - 18)  SpO2: 97% (28 Dec 2017 07:59) (96% - 99%)    General: Resting in bed. No acute distress  HEENT: PERRL,  EOM intact, visual fields full    NEUROLOGICAL EXAM:   Mental status: Cantonese speaking, alert, oriented x3. Able to follow simple commands. Speech is fluent.   Cranial Nerves: Left facial droop, no nystagmus, no dysarthria, tongue is midline.  Motor exam: Normal tone, no drift. Strength 5/5 throughout.  Sensation: Intact to light touch   Coordination/ Gait: Bilateral ataxia/dysmetria, worse on the left    LABS:                        16.7   11.51 )-----------( 276      ( 26 Dec 2017 09:21 )             48.8    12-26    137  |  98  |  18  ----------------------------<  100<H>  3.7   |  25  |  0.78    Ca    9.1      26 Dec 2017 09:11      Hemoglobin A1C, Whole Blood: 6.7 % (12-17 @ 06:40)      IMAGING: Reviewed by me.   CT BRAIN (12/19/2017)  History: Follow-up parenchymal hemorrhage. Multiple axial sections were performed from base of skull to vertex without contrast enhancement. This exam is compared with prior noncontrast head CT performed on December 18, 2017. Parenchymal hemorrhage and surrounding edema is again seen in the   posterior fossa midline. This area. Or hemorrhage measures approximately .6 x 3.0 cm and previously measured approximately 3.4 x 3.1 cm. Mass effect on the fourth ventricles again seen and unchanged. The size and configuration of the prominent lateral third ventricles are again seen and unchanged.  Periventricular white matter lucency is again seen and unchanged. Evaluation of the osseous structures with the appropriate window appears unremarkable. The visualized paranasal sinuses mastoid and middle ear regions appear clear    CT Angio Neck w/ IV Cont (12.15.17) CT BRAIN: Acute intra-axial hemorrhage in the cerebellar vermis causing mass effect on the fourth ventricle. Mild prominence of the third and lateral ventricles. Heterogeneous calcified mass in the extracalvarial soft tissues of the right occipital bone.  CTA BRAIN: Patent intracranial circulation. No flow-limiting stenosis or occlusion.   CTA NECK: Patent cervical vasculature. No flow limiting stenosis or occlusion.   Cerebral Angiogram (12.20.17) : Preliminary report normal pial and dural surfaces. No aneurysms. No venous occlusions. THE PATIENT WAS SEEN AND EXAMINED BY ME WITH THE HOUSESTAFF AND STROKE TEAM DURING MORNING ROUNDS.   HPI:  57 year old male pmhx of htn coming in with 1 day of dizziness prior to admission. Patient presented to ED when the dizziness persisted and was found to have cerebellar hemorrhage and 4th ventricle IVH (Vermian Hemorrhage) for which he was transferred to [facility].   Patient stated he takes no medication and has no pmhx but reportedly has hx of htn.Denies generalized weakness, and nausea. No fall or recent trauma. No numbness, tingling, weakness. No antiplatelets or anticoagulants. Patient currently denies and headache, n/v, blurry vision, double vision, numbness or weakness.       SUBJECTIVE: Noted per wife vomiting this am and unsteady walking last night.    acetaminophen   Tablet 650 milliGRAM(s) Oral every 6 hours PRN  acetaminophen   Tablet. 650 milliGRAM(s) Oral every 6 hours PRN  amLODIPine   Tablet 10 milliGRAM(s) Oral daily  atorvastatin 20 milliGRAM(s) Oral at bedtime  dextrose 50% Injectable 12.5 Gram(s) IV Push once  dextrose 50% Injectable 25 Gram(s) IV Push once  dextrose 50% Injectable 25 Gram(s) IV Push once  dextrose Gel 1 Dose(s) Oral once PRN  docusate sodium 100 milliGRAM(s) Oral daily  enoxaparin Injectable 40 milliGRAM(s) SubCutaneous <User Schedule>  glucagon  Injectable 1 milliGRAM(s) IntraMuscular once PRN  influenza   Vaccine 0.5 milliLiter(s) IntraMuscular once  insulin lispro (HumaLOG) corrective regimen sliding scale   SubCutaneous Before meals and at bedtime  insulin NPH human recombinant 4 Unit(s) SubCutaneous Before meals and at bedtime  lisinopril 40 milliGRAM(s) Oral daily  metoprolol     tartrate 12.5 milliGRAM(s) Oral two times a day  senna 2 Tablet(s) Oral at bedtime  triamcinolone 0.1% Ointment 1 Application(s) Topical two times a day      PHYSICAL EXAM:   Vital Signs Last 24 Hrs  T(C): 36.7 (28 Dec 2017 07:59), Max: 36.9 (28 Dec 2017 00:00)  T(F): 98.1 (28 Dec 2017 07:59), Max: 98.4 (28 Dec 2017 00:00)  HR: 65 (28 Dec 2017 07:59) (62 - 84)  BP: 167/92 (28 Dec 2017 07:59) (150/95 - 168/98)  BP(mean): --  RR: 17 (28 Dec 2017 07:59) (17 - 18)  SpO2: 97% (28 Dec 2017 07:59) (96% - 99%)    General: Resting in bed. No acute distress  HEENT: PERRL,  EOM intact, visual fields full    NEUROLOGICAL EXAM:   Mental status: Cantonese speaking, alert, oriented x3. Able to follow simple commands. Speech is fluent.   Cranial Nerves: Left facial droop, no nystagmus, no dysarthria, tongue is midline.  Motor exam: Normal tone, no drift. Strength 5/5 throughout.  Sensation: Intact to light touch   Coordination/ Gait: Bilateral ataxia/dysmetria, worse on the left, unsteady ataxic gait.    LABS:                        16.7   11.51 )-----------( 276      ( 26 Dec 2017 09:21 )             48.8    12-26    137  |  98  |  18  ----------------------------<  100<H>  3.7   |  25  |  0.78    Ca    9.1      26 Dec 2017 09:11      Hemoglobin A1C, Whole Blood: 6.7 % (12-17 @ 06:40)      IMAGING: Reviewed by me.   CT BRAIN (12/19/2017)  History: Follow-up parenchymal hemorrhage. Multiple axial sections were performed from base of skull to vertex without contrast enhancement. This exam is compared with prior noncontrast head CT performed on December 18, 2017. Parenchymal hemorrhage and surrounding edema is again seen in the   posterior fossa midline. This area. Or hemorrhage measures approximately .6 x 3.0 cm and previously measured approximately 3.4 x 3.1 cm. Mass effect on the fourth ventricles again seen and unchanged. The size and configuration of the prominent lateral third ventricles are again seen and unchanged.  Periventricular white matter lucency is again seen and unchanged. Evaluation of the osseous structures with the appropriate window appears unremarkable. The visualized paranasal sinuses mastoid and middle ear regions appear clear    CT Angio Neck w/ IV Cont (12.15.17) CT BRAIN: Acute intra-axial hemorrhage in the cerebellar vermis causing mass effect on the fourth ventricle. Mild prominence of the third and lateral ventricles. Heterogeneous calcified mass in the extracalvarial soft tissues of the right occipital bone.  CTA BRAIN: Patent intracranial circulation. No flow-limiting stenosis or occlusion.   CTA NECK: Patent cervical vasculature. No flow limiting stenosis or occlusion.   Cerebral Angiogram (12.20.17) : Preliminary report normal pial and dural surfaces. No aneurysms. No venous occlusions. THE PATIENT WAS SEEN AND EXAMINED BY ME WITH THE HOUSESTAFF AND STROKE TEAM DURING MORNING ROUNDS.     HPI:  57 year old male pmhx of htn coming in with 1 day of dizziness prior to admission. Patient presented to ED when the dizziness persisted and was found to have cerebellar hemorrhage and 4th ventricle IVH (Vermian Hemorrhage) for which he was transferred to [facility].   Patient stated he takes no medication and has no pmhx but reportedly has hx of htn.Denies generalized weakness, and nausea. No fall or recent trauma. No numbness, tingling, weakness. No antiplatelets or anticoagulants. Patient currently denies and headache, n/v, blurry vision, double vision, numbness or weakness.     SUBJECTIVE: Noted per wife vomiting this am and unsteady walking.     acetaminophen   Tablet 650 milliGRAM(s) Oral every 6 hours PRN  acetaminophen   Tablet. 650 milliGRAM(s) Oral every 6 hours PRN  amLODIPine   Tablet 10 milliGRAM(s) Oral daily  atorvastatin 20 milliGRAM(s) Oral at bedtime  dextrose 50% Injectable 12.5 Gram(s) IV Push once  dextrose 50% Injectable 25 Gram(s) IV Push once  dextrose 50% Injectable 25 Gram(s) IV Push once  dextrose Gel 1 Dose(s) Oral once PRN  docusate sodium 100 milliGRAM(s) Oral daily  enoxaparin Injectable 40 milliGRAM(s) SubCutaneous <User Schedule>  glucagon  Injectable 1 milliGRAM(s) IntraMuscular once PRN  influenza   Vaccine 0.5 milliLiter(s) IntraMuscular once  insulin lispro (HumaLOG) corrective regimen sliding scale   SubCutaneous Before meals and at bedtime  insulin NPH human recombinant 4 Unit(s) SubCutaneous Before meals and at bedtime  lisinopril 40 milliGRAM(s) Oral daily  metoprolol     tartrate 12.5 milliGRAM(s) Oral two times a day  senna 2 Tablet(s) Oral at bedtime  triamcinolone 0.1% Ointment 1 Application(s) Topical two times a day      PHYSICAL EXAM:   Vital Signs Last 24 Hrs  T(C): 36.7 (28 Dec 2017 07:59), Max: 36.9 (28 Dec 2017 00:00)  T(F): 98.1 (28 Dec 2017 07:59), Max: 98.4 (28 Dec 2017 00:00)  HR: 65 (28 Dec 2017 07:59) (62 - 84)  BP: 167/92 (28 Dec 2017 07:59) (150/95 - 168/98)  BP(mean): --  RR: 17 (28 Dec 2017 07:59) (17 - 18)  SpO2: 97% (28 Dec 2017 07:59) (96% - 99%)    General: Resting in bed. No acute distress  HEENT: PERRL,  EOM intact, visual fields full    NEUROLOGICAL EXAM:   Mental status: Cantonese speaking, alert, oriented x3. Able to follow simple commands. Speech is fluent but with mild dysarthria   Cranial Nerves: Left facial droop, no nystagmus, no dysarthria, tongue is midline.  Motor exam: Normal tone, no drift. Strength 5/5 throughout.  Sensation: Intact to light touch   Coordination/ Gait: Bilateral ataxia/dysmetria, worse on the left, Ataxic gait with mild truncal ataxia     LABS:                        16.7   11.51 )-----------( 276      ( 26 Dec 2017 09:21 )             48.8    12-26    137  |  98  |  18  ----------------------------<  100<H>  3.7   |  25  |  0.78    Ca    9.1      26 Dec 2017 09:11      Hemoglobin A1C, Whole Blood: 6.7 % (12-17 @ 06:40)      IMAGING: Reviewed by me.   CT BRAIN (12/19/2017)  History: Follow-up parenchymal hemorrhage. Multiple axial sections were performed from base of skull to vertex without contrast enhancement. This exam is compared with prior noncontrast head CT performed on December 18, 2017. Parenchymal hemorrhage and surrounding edema is again seen in the   posterior fossa midline. This area. Or hemorrhage measures approximately .6 x 3.0 cm and previously measured approximately 3.4 x 3.1 cm. Mass effect on the fourth ventricles again seen and unchanged. The size and configuration of the prominent lateral third ventricles are again seen and unchanged.  Periventricular white matter lucency is again seen and unchanged. Evaluation of the osseous structures with the appropriate window appears unremarkable. The visualized paranasal sinuses mastoid and middle ear regions appear clear    CT Angio Neck w/ IV Cont (12.15.17) CT BRAIN: Acute intra-axial hemorrhage in the cerebellar vermis causing mass effect on the fourth ventricle. Mild prominence of the third and lateral ventricles. Heterogeneous calcified mass in the extracalvarial soft tissues of the right occipital bone.  CTA BRAIN: Patent intracranial circulation. No flow-limiting stenosis or occlusion.   CTA NECK: Patent cervical vasculature. No flow limiting stenosis or occlusion.   Cerebral Angiogram (12.20.17) : Preliminary report normal pial and dural surfaces. No aneurysms. No venous occlusions.

## 2017-12-28 NOTE — PROGRESS NOTE ADULT - ASSESSMENT
ASSESSMENT:   58 Y/O man with vascular risk factors of age and hypertension reports to have noticed one day of dizziness prior to admission. Patient presented to ED when the dizziness persisted and was found to have cerebellar hemorrhage and 4th ventricle IVH (Vermian Hemorrhage) for which he was transferred to [facility]. MRI brain showed stable paramedian/vermian cerebellar ICH as well as evidence of multiple old microhemorrhages predominantly affecting deep  territories and moderate leukoaraiosis and punctate areas of restriction diffusion involving left frontal and left parieto-occipital region. CTA head on admission as well as Conventional angiogram performed subsequently did not show any evidence of vascular malformation being the etiology of his ICH.     Impression:  Nontraumatic cerebellar ICH. Paramedian/vermian cerebellar intracerebral hemorrhage with associated IVH - likely etiology being microangiopathy related to chronic hypertension leading to hypertensive intracerebral hemorrhage   Small punctate area is of diffusion restriction involving the left frontal and left parietal occipital region - likely etiology being small punctate areas of restriction diffusion associated with intracerebral hemorrhage, as described in the literature   Associated cerebral edema with mass effect and brain compression leading to effacement of the fourth ventricle without any significant hydrocephalus    NEURO: Neurologically improved vs admission, stable cerebral edema with mass effect and brain compression,  s/p cerebral angiography 12/20: demonstrated normal pial and dural surfaces. No aneurysms. No venous occlusions BP goal - gradual normotension. Atorvastatin 20 mg at bedtime considering likely newly diagnosed diabetes and 10 years ASCVD risk being 10.4%, Plan to obtain repeat neuroimaging in 4-6 weeks to r/o underlying pathology. PMR for AR.     ANTITHROMBOTIC THERAPY: None in the setting of ICH and no specific indications at this time    PULMONARY: Protecting airway, saturating well on room air.     CARDIOVASCULAR: TTE: unremarkable and no evidence of LVH, Continue cardiac monitoring, slowly titrate antihypertensive regimen accordingly.      SBP goal: Gradual normotension     GASTROINTESTINAL: dysphagia screen passed. Tolerating diet      Diet: Regular. Consistent Carb.    RENAL:   maintain adequate hydration, good urine output, avoid hyponatremia     Na Goal: Greater than 140     Bray: n        HEMATOLOGY: no active bleeding,     DVT ppx: LMWH      ID: afebrile,  leukocytosis trending down, continue encourage incentive spirometry at home., Urine Cx: <16084 urogenital osei.    Other: Small area of ecchymosis but not hematoma at puncture site with 2+ DP pulses.  Upon closer exam, aubrie rash noted on thighs and encroaching the right groin and abdomen, continue Cetirizine and benadryl, most likely allergic response to contrast.  Appreciate derm consult     DISPOSITION: Discharge to home with family today.      CORE MEASURES:        Admission NIHSS: No     TPA: NO      LDL/HDL: NO     Depression Screen: 0     Statin Therapy: NO     Dysphagia Screen: PASS     Smoking  NO      Afib  NO     Stroke Education YES ASSESSMENT:   56 Y/O man with vascular risk factors of age and hypertension reports to have noticed one day of dizziness prior to admission. Patient presented to ED when the dizziness persisted and was found to have cerebellar hemorrhage and 4th ventricle IVH (Vermian Hemorrhage) for which he was transferred to [facility]. MRI brain showed stable paramedian/vermian cerebellar ICH as well as evidence of multiple old microhemorrhages predominantly affecting deep  territories and moderate leukoaraiosis and punctate areas of restriction diffusion involving left frontal and left parieto-occipital region. CTA head on admission as well as Conventional angiogram performed subsequently did not show any evidence of vascular malformation being the etiology of his ICH.     Impression:  Nontraumatic cerebellar ICH. Paramedian/vermian cerebellar intracerebral hemorrhage with associated IVH - likely etiology being microangiopathy related to chronic hypertension leading to hypertensive intracerebral hemorrhage   Small punctate area is of diffusion restriction involving the left frontal and left parietal occipital region - likely etiology being small punctate areas of restriction diffusion associated with intracerebral hemorrhage, as described in the literature   Associated cerebral edema with mass effect and brain compression leading to effacement of the fourth ventricle without any significant hydrocephalus    NEURO: Neurologically improved vs admission but remains with significant gait instability, stable cerebral edema with mass effect and brain compression, given vomiting will repeat CTH,  s/p cerebral angiography 12/20: demonstrated normal pial and dural surfaces. No aneurysms. No venous occlusions BP goal - gradual normotension. Atorvastatin 20 mg at bedtime considering likely newly diagnosed diabetes and 10 years ASCVD risk being 10.4%, Plan to obtain repeat neuroimaging in 4-6 weeks to r/o underlying pathology. PMR for AR.     ANTITHROMBOTIC THERAPY: None in the setting of ICH and no specific indications at this time    PULMONARY: Protecting airway, saturating well on room air.     CARDIOVASCULAR: TTE: unremarkable and no evidence of LVH, Continue cardiac monitoring, slowly titrate antihypertensive regimen accordingly. Obtain orthostatics.     SBP goal: Gradual normotension     GASTROINTESTINAL: dysphagia screen passed. Tolerating diet      Diet: Regular. Consistent Carb.    RENAL:   maintain adequate hydration, good urine output, avoid hyponatremia     Na Goal: Greater than 140     Bray: n        HEMATOLOGY: no active bleeding,     DVT ppx: LMWH      ID: afebrile,  leukocytosis trending down, continue encourage incentive spirometry at home., Urine Cx: <[id number] urogenital osei.    Other: Small area of ecchymosis but not hematoma at puncture site with 2+ DP pulses.  Upon closer exam, aubrie rash noted on thighs and encroaching the right groin and abdomen, continue Cetirizine and benadryl, most likely allergic response to contrast.  Appreciate derm consult     DISPOSITION: Discharge to home with family once optimized from PT/OT standpoint- social work aware..      CORE MEASURES:        Admission NIHSS: No     TPA: NO      LDL/HDL: NO     Depression Screen: 0     Statin Therapy: NO     Dysphagia Screen: PASS     Smoking  NO      Afib  NO     Stroke Education YES ASSESSMENT:   58 Y/O man with vascular risk factors of age and hypertension reports to have noticed one day of dizziness prior to admission. Patient presented to ED when the dizziness persisted and was found to have cerebellar hemorrhage and 4th ventricle IVH (Vermian Hemorrhage) for which he was transferred to [facility]. MRI brain showed stable paramedian/vermian cerebellar ICH as well as evidence of multiple old microhemorrhages predominantly affecting deep  territories and moderate leukoaraiosis and punctate areas of restriction diffusion involving left frontal and left parieto-occipital region. CTA head on admission as well as Conventional angiogram performed subsequently did not show any evidence of vascular malformation being the etiology of his ICH. Repeat CT brain on 12/28 showed expected evolution of vermian hemorrhage with stable mass effect and effacement of the fourth ventricle without any worsening hydrocephalus as compared to previous brain imaging.    Impression:  Nontraumatic cerebellar ICH. Paramedian/vermian cerebellar intracerebral hemorrhage with associated IVH - likely etiology being microangiopathy related to chronic hypertension leading to hypertensive intracerebral hemorrhage   Small punctate area is of diffusion restriction involving the left frontal and left parietal occipital region - likely etiology being small punctate areas of restriction diffusion associated with intracerebral hemorrhage, as described in the literature   Associated cerebral edema with mass effect and brain compression leading to effacement of the fourth ventricle with stable hydrocephalus    NEURO: Neurologically improved vs admission but remains with significant gait instability, stable cerebral edema with mass effect and brain compression, given vomiting will repeat CTH,  s/p cerebral angiography 12/20: demonstrated normal pial and dural surfaces. No aneurysms. No venous occlusions BP goal - gradual normotension. Atorvastatin 20 mg at bedtime considering likely newly diagnosed diabetes and 10 years ASCVD risk being 10.4%, Plan to obtain repeat neuroimaging in 4-6 weeks to r/o underlying pathology. PMR for AR.     ANTITHROMBOTIC THERAPY: None in the setting of ICH and no specific indications at this time    PULMONARY: Protecting airway, saturating well on room air.     CARDIOVASCULAR: TTE: unremarkable and no evidence of LVH, Continue cardiac monitoring, slowly titrate antihypertensive regimen accordingly. Obtain orthostatics.     SBP goal: Gradual normotension     GASTROINTESTINAL: dysphagia screen passed. Tolerating diet      Diet: Regular. Consistent Carb.    RENAL:   maintain adequate hydration, good urine output, avoid hyponatremia     Na Goal: Greater than 140     Bray: n        HEMATOLOGY: no active bleeding,     DVT ppx: LMWH      ID: afebrile, leukocytosis trending down, continue encourage incentive spirometry at home., Urine Cx: <[id number] urogenital osei.    Other: Small area of ecchymosis but not hematoma at puncture site with 2+ DP pulses.  Upon closer exam, aubrie rash noted on thighs and encroaching the right groin and abdomen, continue Cetirizine and benadryl, most likely allergic response to contrast.  Appreciate derm consult. Plan was discussed with patient and available family member at bedside in detail through the  phone.     DISPOSITION: Discharge to home with family once optimized from PT/OT standpoint - social work aware.      CORE MEASURES:        Admission NIHSS: No     TPA: NO      LDL/HDL: NO     Depression Screen: 0     Statin Therapy: NO     Dysphagia Screen: PASS     Smoking  NO      Afib  NO     Stroke Education YES

## 2017-12-29 LAB
ANION GAP SERPL CALC-SCNC: 14 MMOL/L — SIGNIFICANT CHANGE UP (ref 5–17)
BUN SERPL-MCNC: 18 MG/DL — SIGNIFICANT CHANGE UP (ref 7–23)
CALCIUM SERPL-MCNC: 8.9 MG/DL — SIGNIFICANT CHANGE UP (ref 8.4–10.5)
CHLORIDE SERPL-SCNC: 98 MMOL/L — SIGNIFICANT CHANGE UP (ref 96–108)
CO2 SERPL-SCNC: 26 MMOL/L — SIGNIFICANT CHANGE UP (ref 22–31)
CREAT SERPL-MCNC: 0.76 MG/DL — SIGNIFICANT CHANGE UP (ref 0.5–1.3)
GLUCOSE BLDC GLUCOMTR-MCNC: 131 MG/DL — HIGH (ref 70–99)
GLUCOSE BLDC GLUCOMTR-MCNC: 134 MG/DL — HIGH (ref 70–99)
GLUCOSE BLDC GLUCOMTR-MCNC: 135 MG/DL — HIGH (ref 70–99)
GLUCOSE BLDC GLUCOMTR-MCNC: 140 MG/DL — HIGH (ref 70–99)
GLUCOSE BLDC GLUCOMTR-MCNC: 243 MG/DL — HIGH (ref 70–99)
GLUCOSE SERPL-MCNC: 148 MG/DL — HIGH (ref 70–99)
HCT VFR BLD CALC: 49 % — SIGNIFICANT CHANGE UP (ref 39–50)
HGB BLD-MCNC: 16.8 G/DL — SIGNIFICANT CHANGE UP (ref 13–17)
MCHC RBC-ENTMCNC: 30.4 PG — SIGNIFICANT CHANGE UP (ref 27–34)
MCHC RBC-ENTMCNC: 34.3 GM/DL — SIGNIFICANT CHANGE UP (ref 32–36)
MCV RBC AUTO: 88.6 FL — SIGNIFICANT CHANGE UP (ref 80–100)
PLATELET # BLD AUTO: 310 K/UL — SIGNIFICANT CHANGE UP (ref 150–400)
POTASSIUM SERPL-MCNC: 4.2 MMOL/L — SIGNIFICANT CHANGE UP (ref 3.5–5.3)
POTASSIUM SERPL-SCNC: 4.2 MMOL/L — SIGNIFICANT CHANGE UP (ref 3.5–5.3)
RBC # BLD: 5.53 M/UL — SIGNIFICANT CHANGE UP (ref 4.2–5.8)
RBC # FLD: 13.9 % — SIGNIFICANT CHANGE UP (ref 10.3–14.5)
SODIUM SERPL-SCNC: 138 MMOL/L — SIGNIFICANT CHANGE UP (ref 135–145)
WBC # BLD: 16.07 K/UL — HIGH (ref 3.8–10.5)
WBC # FLD AUTO: 16.07 K/UL — HIGH (ref 3.8–10.5)

## 2017-12-29 PROCEDURE — 71010: CPT | Mod: 26

## 2017-12-29 PROCEDURE — 99233 SBSQ HOSP IP/OBS HIGH 50: CPT

## 2017-12-29 PROCEDURE — 93970 EXTREMITY STUDY: CPT | Mod: 26

## 2017-12-29 RX ORDER — METOPROLOL TARTRATE 50 MG
25 TABLET ORAL
Qty: 0 | Refills: 0 | Status: DISCONTINUED | OUTPATIENT
Start: 2017-12-29 | End: 2018-01-01

## 2017-12-29 RX ADMIN — LISINOPRIL 40 MILLIGRAM(S): 2.5 TABLET ORAL at 12:35

## 2017-12-29 RX ADMIN — Medication 12.5 MILLIGRAM(S): at 05:17

## 2017-12-29 RX ADMIN — Medication 100 MILLIGRAM(S): at 12:35

## 2017-12-29 RX ADMIN — HUMAN INSULIN 4 UNIT(S): 100 INJECTION, SUSPENSION SUBCUTANEOUS at 12:34

## 2017-12-29 RX ADMIN — HUMAN INSULIN 4 UNIT(S): 100 INJECTION, SUSPENSION SUBCUTANEOUS at 18:35

## 2017-12-29 RX ADMIN — Medication 1 APPLICATION(S): at 05:17

## 2017-12-29 RX ADMIN — ATORVASTATIN CALCIUM 20 MILLIGRAM(S): 80 TABLET, FILM COATED ORAL at 22:26

## 2017-12-29 RX ADMIN — Medication 650 MILLIGRAM(S): at 22:32

## 2017-12-29 RX ADMIN — ENOXAPARIN SODIUM 40 MILLIGRAM(S): 100 INJECTION SUBCUTANEOUS at 18:36

## 2017-12-29 RX ADMIN — Medication 6: at 12:34

## 2017-12-29 RX ADMIN — Medication 1 APPLICATION(S): at 18:36

## 2017-12-29 RX ADMIN — HUMAN INSULIN 4 UNIT(S): 100 INJECTION, SUSPENSION SUBCUTANEOUS at 22:26

## 2017-12-29 RX ADMIN — Medication 25 MILLIGRAM(S): at 18:36

## 2017-12-29 RX ADMIN — AMLODIPINE BESYLATE 10 MILLIGRAM(S): 2.5 TABLET ORAL at 22:26

## 2017-12-29 RX ADMIN — SENNA PLUS 2 TABLET(S): 8.6 TABLET ORAL at 22:26

## 2017-12-29 RX ADMIN — HUMAN INSULIN 4 UNIT(S): 100 INJECTION, SUSPENSION SUBCUTANEOUS at 09:07

## 2017-12-29 NOTE — PROGRESS NOTE ADULT - ASSESSMENT
ASSESSMENT:   58 Y/O man with vascular risk factors of age and hypertension reports to have noticed one day of dizziness prior to admission. Patient presented to ED when the dizziness persisted and was found to have cerebellar hemorrhage and 4th ventricle IVH (Vermian Hemorrhage) for which he was transferred to [facility]. MRI brain showed stable paramedian/vermian cerebellar ICH as well as evidence of multiple old microhemorrhages predominantly affecting deep  territories and moderate leukoaraiosis and punctate areas of restriction diffusion involving left frontal and left parieto-occipital region. CTA head on admission as well as Conventional angiogram performed subsequently did not show any evidence of vascular malformation being the etiology of his ICH. Repeat CT brain on 12/28 showed expected evolution of vermian hemorrhage with stable mass effect and effacement of the fourth ventricle without any worsening hydrocephalus as compared to previous brain imaging.    Impression:  Nontraumatic cerebellar ICH. Paramedian/vermian cerebellar intracerebral hemorrhage with associated IVH - likely etiology being microangiopathy related to chronic hypertension leading to hypertensive intracerebral hemorrhage   Small punctate area is of diffusion restriction involving the left frontal and left parietal occipital region - likely etiology being small punctate areas of restriction diffusion associated with intracerebral hemorrhage, as described in the literature   Associated cerebral edema with mass effect and brain compression leading to effacement of the fourth ventricle with stable hydrocephalus    NEURO: Neurologically improved vs admission but remains with significant gait instability, stable cerebral edema with mass effect and brain compression, given vomiting will repeat CTH,  s/p cerebral angiography 12/20: demonstrated normal pial and dural surfaces. No aneurysms. No venous occlusions BP goal - gradual normotension. Atorvastatin 20 mg at bedtime considering likely newly diagnosed diabetes and 10 years ASCVD risk being 10.4%, Plan to obtain repeat neuroimaging in 4-6 weeks to r/o underlying pathology. PMR for AR.     ANTITHROMBOTIC THERAPY: None in the setting of ICH and no specific indications at this time    PULMONARY: Protecting airway, saturating well on room air.     CARDIOVASCULAR: TTE: unremarkable and no evidence of LVH, Continue cardiac monitoring, slowly titrate antihypertensive regimen accordingly. Obtain orthostatics.     SBP goal: Gradual normotension     GASTROINTESTINAL: dysphagia screen passed. Tolerating diet      Diet: Regular. Consistent Carb.    RENAL:   maintain adequate hydration, good urine output, avoid hyponatremia     Na Goal: Greater than 140     Bray: n        HEMATOLOGY: no active bleeding,     DVT ppx: LMWH      ID: afebrile, leukocytosis trending down, continue encourage incentive spirometry at home., Urine Cx: <[id number] urogenital osei.    Other: Small area of ecchymosis but not hematoma at puncture site with 2+ DP pulses.  Upon closer exam, aubrie rash noted on thighs and encroaching the right groin and abdomen, continue Cetirizine and benadryl, most likely allergic response to contrast.  Appreciate derm consult. Plan was discussed with patient and available family member at bedside in detail through the  phone.     DISPOSITION: Discharge to home with family once optimized from PT/OT standpoint - social work aware.      CORE MEASURES:        Admission NIHSS: No     TPA: NO      LDL/HDL: NO     Depression Screen: 0     Statin Therapy: NO     Dysphagia Screen: PASS     Smoking  NO      Afib  NO     Stroke Education YES ASSESSMENT:   56 Y/O man with vascular risk factors of age and hypertension reports to have noticed one day of dizziness prior to admission. Patient presented to ED when the dizziness persisted and was found to have cerebellar hemorrhage and 4th ventricle IVH (Vermian Hemorrhage) for which he was transferred to [facility]. MRI brain showed stable paramedian/vermian cerebellar ICH as well as evidence of multiple old microhemorrhages predominantly affecting deep  territories and moderate leukoaraiosis and punctate areas of restriction diffusion involving left frontal and left parieto-occipital region. CTA head on admission as well as Conventional angiogram performed subsequently did not show any evidence of vascular malformation being the etiology of his ICH. Repeat CT brain on 12/28 showed expected evolution of vermian hemorrhage with stable mass effect and effacement of the fourth ventricle without any worsening hydrocephalus as compared to previous brain imaging.    Impression:  Nontraumatic cerebellar ICH. Paramedian/vermian cerebellar intracerebral hemorrhage with associated IVH - likely etiology being microangiopathy related to chronic hypertension leading to hypertensive intracerebral hemorrhage   Small punctate area is of diffusion restriction involving the left frontal and left parietal occipital region - likely etiology being small punctate areas of restriction diffusion associated with intracerebral hemorrhage, as described in the literature   Associated cerebral edema with mass effect and brain compression leading to effacement of the fourth ventricle with stable hydrocephalus    NEURO: Neurologically improved vs admission but remains with significant gait instability, stable cerebral edema with mass effect and brain compression, given vomiting will repeat CTH,  s/p cerebral angiography 12/20: demonstrated normal pial and dural surfaces. No aneurysms. No venous occlusions BP goal - gradual normotension. Atorvastatin 20 mg at bedtime considering likely newly diagnosed diabetes and 10 years ASCVD risk being 10.4%, Plan to obtain repeat neuroimaging in 4-6 weeks to r/o underlying pathology. PMR for AR.     ANTITHROMBOTIC THERAPY: None in the setting of ICH and no specific indications at this time    PULMONARY: Protecting airway, saturating well on room air.     CARDIOVASCULAR: TTE: unremarkable and no evidence of LVH, Continue cardiac monitoring, slowly titrate antihypertensive regimen accordingly. Obtain orthostatics.     SBP goal: Gradual normotension     GASTROINTESTINAL: dysphagia screen passed. Tolerating diet      Diet: Regular. Consistent Carb.    RENAL:   maintain adequate hydration, good urine output, avoid hyponatremia     Na Goal: Greater than 140     Bray: n        HEMATOLOGY: no active bleeding,     DVT ppx: LMWH      ID: afebrile, leukocytosis, pancx ordered, pending result, continue encourage incentive spirometry at home.    Other: Small area of ecchymosis but not hematoma at puncture site with 2+ DP pulses.  Upon closer exam, aubrie rash noted on thighs and encroaching the right groin and abdomen, continue Cetirizine and benadryl, most likely allergic response to contrast.  Appreciate derm consult. Plan was discussed with patient and available family member at bedside in detail through the  phone.     DISPOSITION: Discharge to home with family once optimized from PT/OT standpoint, yesterday the sister felt that she needs more guidance and may not be able to care for him at home, PT will continue to work with her - social work aware.      CORE MEASURES:        Admission NIHSS: No     TPA: NO      LDL/HDL: NO     Depression Screen: 0     Statin Therapy: NO     Dysphagia Screen: PASS     Smoking  NO      Afib  NO     Stroke Education YES ASSESSMENT:   56 Y/O man with vascular risk factors of age and hypertension reports to have noticed one day of dizziness prior to admission. Patient presented to ED when the dizziness persisted and was found to have cerebellar hemorrhage and 4th ventricle IVH (Vermian Hemorrhage) for which he was transferred to [facility]. MRI brain showed stable paramedian/vermian cerebellar ICH as well as evidence of multiple old microhemorrhages predominantly affecting deep  territories and moderate leukoaraiosis and punctate areas of restriction diffusion involving left frontal and left parieto-occipital region. CTA head on admission as well as Conventional angiogram performed subsequently did not show any evidence of vascular malformation being the etiology of his ICH. Repeat CT brain on 12/28 showed expected evolution of vermian hemorrhage with stable mass effect and effacement of the fourth ventricle without any worsening hydrocephalus as compared to previous brain imaging.    Impression:  Nontraumatic cerebellar ICH. Paramedian/vermian cerebellar intracerebral hemorrhage with associated IVH - likely etiology being microangiopathy related to chronic hypertension leading to hypertensive intracerebral hemorrhage   Small punctate area is of diffusion restriction involving the left frontal and left parietal occipital region - likely etiology being small punctate areas of restriction diffusion associated with intracerebral hemorrhage, as described in the literature   Associated cerebral edema with mass effect and brain compression leading to effacement of the fourth ventricle with stable hydrocephalus    NEURO: Neurologically improved vs admission but remains with significant gait instability, stable cerebral edema with mass effect and brain compression, given vomiting will repeat CTH,  s/p cerebral angiography 12/20: demonstrated normal pial and dural surfaces. No aneurysms. No venous occlusions BP goal - gradual normotension. Atorvastatin 20 mg at bedtime considering likely newly diagnosed diabetes and 10 years ASCVD risk being 10.4%, Plan to obtain repeat neuroimaging in 4-6 weeks to r/o underlying pathology. PMR for AR.     ANTITHROMBOTIC THERAPY: None in the setting of ICH and no specific indications at this time    PULMONARY: Protecting airway, saturating well on room air.     CARDIOVASCULAR: TTE - unremarkable and no evidence of LVH, Continue cardiac monitoring, slowly titrate antihypertensive regimen accordingly. Obtain orthostatics.     SBP goal: Gradual normotension     GASTROINTESTINAL: dysphagia screen passed. Tolerating diet      Diet: Regular. Consistent Carb.    RENAL: maintain adequate hydration, good urine output, avoid hyponatremia     Na Goal: Greater than 140     Bray: n        HEMATOLOGY: no active bleeding     DVT ppx: LMWH      ID: afebrile, leukocytosis, pan-Cx ordered, pending result, continue encourage incentive spirometry as tolerated.    Other: Small area of ecchymosis but not hematoma at puncture site with 2+ DP pulses.  Upon closer exam, aubrie rash noted on thighs and encroaching the right groin and abdomen, continue Cetirizine and benadryl, most likely allergic response to contrast.  Appreciate derm consult. Plan was discussed with patient and available family member at bedside in detail through the  phone.     DISPOSITION: Discharge to home with family once optimized from PT/OT standpoint, yesterday the sister felt that she needs more guidance and may not be able to care for him at home, PT will continue to work with her - social work aware.      CORE MEASURES:        Admission NIHSS: No     TPA: NO      LDL/HDL: NO     Depression Screen: 0     Statin Therapy: NO     Dysphagia Screen: PASS     Smoking  NO      Afib  NO     Stroke Education YES ASSESSMENT:   56 Y/O man with vascular risk factors of age and hypertension reports to have noticed one day of dizziness prior to admission. Patient presented to ED when the dizziness persisted and was found to have cerebellar hemorrhage and 4th ventricle IVH (Vermian Hemorrhage) for which he was transferred to [facility]. MRI brain showed stable paramedian/vermian cerebellar ICH as well as evidence of multiple old microhemorrhages predominantly affecting deep  territories and moderate leukoaraiosis and punctate areas of restriction diffusion involving left frontal and left parieto-occipital region. CTA head on admission as well as Conventional angiogram performed subsequently did not show any evidence of vascular malformation being the etiology of his ICH. Repeat CT brain on 12/28 showed expected evolution of vermian hemorrhage with stable mass effect and effacement of the fourth ventricle without any worsening hydrocephalus as compared to previous brain imaging.    Impression:  Nontraumatic cerebellar ICH. Paramedian/vermian cerebellar intracerebral hemorrhage with associated IVH - likely etiology being microangiopathy related to chronic hypertension leading to hypertensive intracerebral hemorrhage   Small punctate area is of diffusion restriction involving the left frontal and left parietal occipital region - likely etiology being small punctate areas of restriction diffusion associated with intracerebral hemorrhage, as described in the literature   Associated cerebral edema with mass effect and brain compression leading to effacement of the fourth ventricle with stable hydrocephalus    NEURO: Neurologically improved vs admission but remains with significant gait instability, stable cerebral edema with mass effect and brain compression, given vomiting will repeat CTH,  s/p cerebral angiography 12/20: demonstrated normal pial and dural surfaces. No aneurysms. No venous occlusions BP goal - gradual normotension. Atorvastatin 20 mg at bedtime considering likely newly diagnosed diabetes and 10 years ASCVD risk being 10.4%, Plan to obtain repeat neuroimaging in 4-6 weeks to r/o underlying pathology. PMR for AR.     ANTITHROMBOTIC THERAPY: None in the setting of ICH and no specific indications at this time    PULMONARY: Protecting airway, saturating well on room air.     CARDIOVASCULAR: TTE - unremarkable and no evidence of LVH, Continue cardiac monitoring, slowly titrate antihypertensive regimen accordingly. Obtain orthostatics.     SBP goal: Gradual normotension     GASTROINTESTINAL: dysphagia screen passed. Tolerating diet      Diet: Regular. Consistent Carb.    RENAL: maintain adequate hydration, good urine output, avoid hyponatremia     Na Goal: Greater than 140     Bray: n        HEMATOLOGY: no active bleeding     DVT ppx: LMWH      ID: afebrile, leukocytosis, pan-Cx ordered, pending result, continue encourage incentive spirometry as tolerated. Lower extremity duplex and LFTs to evaluate for up trending white count    Other: Small area of ecchymosis but not hematoma at puncture site with 2+ DP pulses.  Upon closer exam, aubrie rash noted on thighs and encroaching the right groin and abdomen, continue Cetirizine and benadryl, most likely allergic response to contrast.  Appreciate derm consult. Plan was discussed with patient and available family member at bedside in detail through the  phone.     DISPOSITION: Discharge to home with family once optimized from PT/OT standpoint, yesterday the sister felt that she needs more guidance and may not be able to care for him at home, PT will continue to work with her - social work aware.      CORE MEASURES:        Admission NIHSS: No     TPA: NO      LDL/HDL: NO     Depression Screen: 0     Statin Therapy: NO     Dysphagia Screen: PASS     Smoking  NO      Afib  NO     Stroke Education YES

## 2017-12-29 NOTE — PROGRESS NOTE ADULT - SUBJECTIVE AND OBJECTIVE BOX
THE PATIENT WAS SEEN AND EXAMINED BY ME WITH THE HOUSESTAFF AND STROKE TEAM DURING MORNING ROUNDS.   HPI:  57 year old male pmhx of htn coming in with 1 day of dizziness prior to admission. Patient presented to ED when the dizziness persisted and was found to have cerebellar hemorrhage and 4th ventricle IVH (Vermian Hemorrhage) for which he was transferred to [facility].   Patient stated he takes no medication and has no pmhx but reportedly has hx of htn.Denies generalized weakness, and nausea. No fall or recent trauma. No numbness, tingling, weakness. No antiplatelets or anticoagulants. Patient currently denies and headache, n/v, blurry vision, double vision, numbness or weakness.     SUBJECTIVE: No events overnight.  No new neurologic complaints.      acetaminophen   Tablet 650 milliGRAM(s) Oral every 6 hours PRN  acetaminophen   Tablet. 650 milliGRAM(s) Oral every 6 hours PRN  amLODIPine   Tablet 10 milliGRAM(s) Oral daily  atorvastatin 20 milliGRAM(s) Oral at bedtime  dextrose 50% Injectable 12.5 Gram(s) IV Push once  dextrose 50% Injectable 25 Gram(s) IV Push once  dextrose 50% Injectable 25 Gram(s) IV Push once  dextrose Gel 1 Dose(s) Oral once PRN  docusate sodium 100 milliGRAM(s) Oral daily  enoxaparin Injectable 40 milliGRAM(s) SubCutaneous <User Schedule>  glucagon  Injectable 1 milliGRAM(s) IntraMuscular once PRN  influenza   Vaccine 0.5 milliLiter(s) IntraMuscular once  insulin lispro (HumaLOG) corrective regimen sliding scale   SubCutaneous Before meals and at bedtime  insulin NPH human recombinant 4 Unit(s) SubCutaneous Before meals and at bedtime  lisinopril 40 milliGRAM(s) Oral daily  metoprolol     tartrate 12.5 milliGRAM(s) Oral daily  senna 2 Tablet(s) Oral at bedtime  triamcinolone 0.1% Ointment 1 Application(s) Topical two times a day      PHYSICAL EXAM:   Vital Signs Last 24 Hrs  T(C): 36.4 (29 Dec 2017 07:50), Max: 36.9 (29 Dec 2017 00:40)  T(F): 97.6 (29 Dec 2017 07:50), Max: 98.5 (29 Dec 2017 00:40)  HR: 70 (29 Dec 2017 07:50) (65 - 75)  BP: 146/84 (29 Dec 2017 07:50) (113/74 - 178/94)  BP(mean): --  RR: 18 (29 Dec 2017 07:50) (17 - 18)  SpO2: 97% (29 Dec 2017 07:50) (94% - 98%)      General: Resting in bed. No acute distress  HEENT: PERRL,  EOM intact, visual fields full    NEUROLOGICAL EXAM:   Mental status: Cantonese speaking, alert, oriented x3. Able to follow simple commands. Speech is fluent but with mild dysarthria   Cranial Nerves: Left facial droop, no nystagmus, no dysarthria, tongue is midline.  Motor exam: Normal tone, no drift. Strength 5/5 throughout.  Sensation: Intact to light touch   Coordination/ Gait: Bilateral ataxia/dysmetria, worse on the left, Ataxic gait with mild truncal ataxia     LABS:         Hemoglobin A1C, Whole Blood: 6.7 % (12-17 @ 06:40)      IMAGING: Reviewed by me.   CT BRAIN (12/19/2017)  History: Follow-up parenchymal hemorrhage. Multiple axial sections were performed from base of skull to vertex without contrast enhancement. This exam is compared with prior noncontrast head CT performed on December 18, 2017. Parenchymal hemorrhage and surrounding edema is again seen in the   posterior fossa midline. This area. Or hemorrhage measures approximately .6 x 3.0 cm and previously measured approximately 3.4 x 3.1 cm. Mass effect on the fourth ventricles again seen and unchanged. The size and configuration of the prominent lateral third ventricles are again seen and unchanged.  Periventricular white matter lucency is again seen and unchanged. Evaluation of the osseous structures with the appropriate window appears unremarkable. The visualized paranasal sinuses mastoid and middle ear regions appear clear    CT Angio Neck w/ IV Cont (12.15.17) CT BRAIN: Acute intra-axial hemorrhage in the cerebellar vermis causing mass effect on the fourth ventricle. Mild prominence of the third and lateral ventricles. Heterogeneous calcified mass in the extracalvarial soft tissues of the right occipital bone.  CTA BRAIN: Patent intracranial circulation. No flow-limiting stenosis or occlusion.   CTA NECK: Patent cervical vasculature. No flow limiting stenosis or occlusion.   Cerebral Angiogram (12.20.17) : Preliminary report normal pial and dural surfaces. No aneurysms. No venous occlusions.  CT Head No Cont (12.28.17 @ 14:22)   IMPRESSION:   Resolving vermian hemorrhage compared with 12/19/2017. No change in mass   effect on the fourth ventricle or rostral hydrocephalus. THE PATIENT WAS SEEN AND EXAMINED BY ME WITH THE HOUSESTAFF AND STROKE TEAM DURING MORNING ROUNDS.     HPI:  57 year old male pmhx of htn coming in with 1 day of dizziness prior to admission. 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.   Patient stated he takes no medication and has no pmhx but reportedly has hx of htn.Denies generalized weakness, and nausea. No fall or recent trauma. No numbness, tingling, weakness. No antiplatelets or anticoagulants. Patient currently denies and headache, n/v, blurry vision, double vision, numbness or weakness.     SUBJECTIVE: No events overnight.  No new neurologic complaints.      acetaminophen   Tablet 650 milliGRAM(s) Oral every 6 hours PRN  acetaminophen   Tablet. 650 milliGRAM(s) Oral every 6 hours PRN  amLODIPine   Tablet 10 milliGRAM(s) Oral daily  atorvastatin 20 milliGRAM(s) Oral at bedtime  dextrose 50% Injectable 12.5 Gram(s) IV Push once  dextrose 50% Injectable 25 Gram(s) IV Push once  dextrose 50% Injectable 25 Gram(s) IV Push once  dextrose Gel 1 Dose(s) Oral once PRN  docusate sodium 100 milliGRAM(s) Oral daily  enoxaparin Injectable 40 milliGRAM(s) SubCutaneous <User Schedule>  glucagon  Injectable 1 milliGRAM(s) IntraMuscular once PRN  influenza   Vaccine 0.5 milliLiter(s) IntraMuscular once  insulin lispro (HumaLOG) corrective regimen sliding scale   SubCutaneous Before meals and at bedtime  insulin NPH human recombinant 4 Unit(s) SubCutaneous Before meals and at bedtime  lisinopril 40 milliGRAM(s) Oral daily  metoprolol     tartrate 12.5 milliGRAM(s) Oral daily  senna 2 Tablet(s) Oral at bedtime  triamcinolone 0.1% Ointment 1 Application(s) Topical two times a day      PHYSICAL EXAM:   Vital Signs Last 24 Hrs  T(C): 36.4 (29 Dec 2017 07:50), Max: 36.9 (29 Dec 2017 00:40)  T(F): 97.6 (29 Dec 2017 07:50), Max: 98.5 (29 Dec 2017 00:40)  HR: 70 (29 Dec 2017 07:50) (65 - 75)  BP: 146/84 (29 Dec 2017 07:50) (113/74 - 178/94)  BP(mean): --  RR: 18 (29 Dec 2017 07:50) (17 - 18)  SpO2: 97% (29 Dec 2017 07:50) (94% - 98%)      General: Resting in bed. No acute distress  HEENT: PERRL,  EOM intact, visual fields full    NEUROLOGICAL EXAM:   Mental status: Cantonese speaking, alert, oriented x3. Able to follow simple commands. Speech is fluent but with mild dysarthria   Cranial Nerves: Left facial droop, no nystagmus, no dysarthria, tongue is midline.  Motor exam: Normal tone, no drift. Strength 5/5 throughout.  Sensation: Intact to light touch   Coordination/ Gait: Left ataxia/dysmetria, ataxic gait with mild truncal ataxia     LABS:         Hemoglobin A1C, Whole Blood: 6.7 % (12-17 @ 06:40)      IMAGING: Reviewed by me.   CT BRAIN (12/19/2017)  History: Follow-up parenchymal hemorrhage. Multiple axial sections were performed from base of skull to vertex without contrast enhancement. This exam is compared with prior noncontrast head CT performed on December 18, 2017. Parenchymal hemorrhage and surrounding edema is again seen in the   posterior fossa midline. This area. Or hemorrhage measures approximately .6 x 3.0 cm and previously measured approximately 3.4 x 3.1 cm. Mass effect on the fourth ventricles again seen and unchanged. The size and configuration of the prominent lateral third ventricles are again seen and unchanged.  Periventricular white matter lucency is again seen and unchanged. Evaluation of the osseous structures with the appropriate window appears unremarkable. The visualized paranasal sinuses mastoid and middle ear regions appear clear    CT Angio Neck w/ IV Cont (12.15.17) CT BRAIN: Acute intra-axial hemorrhage in the cerebellar vermis causing mass effect on the fourth ventricle. Mild prominence of the third and lateral ventricles. Heterogeneous calcified mass in the extracalvarial soft tissues of the right occipital bone.  CTA BRAIN: Patent intracranial circulation. No flow-limiting stenosis or occlusion.   CTA NECK: Patent cervical vasculature. No flow limiting stenosis or occlusion.   Cerebral Angiogram (12.20.17) : Preliminary report normal pial and dural surfaces. No aneurysms. No venous occlusions.  CT Head No Cont (12.28.17 @ 14:22)   IMPRESSION:   Resolving vermian hemorrhage compared with 12/19/2017. No change in mass   effect on the fourth ventricle or rostral hydrocephalus.

## 2017-12-30 LAB
ALBUMIN SERPL ELPH-MCNC: 3.8 G/DL — SIGNIFICANT CHANGE UP (ref 3.3–5)
ALP SERPL-CCNC: 59 U/L — SIGNIFICANT CHANGE UP (ref 40–120)
ALT FLD-CCNC: 77 U/L RC — HIGH (ref 10–45)
ANION GAP SERPL CALC-SCNC: 12 MMOL/L — SIGNIFICANT CHANGE UP (ref 5–17)
APPEARANCE UR: CLEAR — SIGNIFICANT CHANGE UP
AST SERPL-CCNC: 28 U/L — SIGNIFICANT CHANGE UP (ref 10–40)
BILIRUB DIRECT SERPL-MCNC: 0.1 MG/DL — SIGNIFICANT CHANGE UP (ref 0–0.2)
BILIRUB INDIRECT FLD-MCNC: 0.5 MG/DL — SIGNIFICANT CHANGE UP (ref 0.2–1)
BILIRUB SERPL-MCNC: 0.6 MG/DL — SIGNIFICANT CHANGE UP (ref 0.2–1.2)
BILIRUB UR-MCNC: NEGATIVE — SIGNIFICANT CHANGE UP
BUN SERPL-MCNC: 19 MG/DL — SIGNIFICANT CHANGE UP (ref 7–23)
CALCIUM SERPL-MCNC: 9 MG/DL — SIGNIFICANT CHANGE UP (ref 8.4–10.5)
CHLORIDE SERPL-SCNC: 97 MMOL/L — SIGNIFICANT CHANGE UP (ref 96–108)
CO2 SERPL-SCNC: 26 MMOL/L — SIGNIFICANT CHANGE UP (ref 22–31)
COLOR SPEC: YELLOW — SIGNIFICANT CHANGE UP
CREAT SERPL-MCNC: 0.69 MG/DL — SIGNIFICANT CHANGE UP (ref 0.5–1.3)
CULTURE RESULTS: NO GROWTH — SIGNIFICANT CHANGE UP
DIFF PNL FLD: NEGATIVE — SIGNIFICANT CHANGE UP
GLUCOSE BLDC GLUCOMTR-MCNC: 121 MG/DL — HIGH (ref 70–99)
GLUCOSE BLDC GLUCOMTR-MCNC: 127 MG/DL — HIGH (ref 70–99)
GLUCOSE BLDC GLUCOMTR-MCNC: 182 MG/DL — HIGH (ref 70–99)
GLUCOSE BLDC GLUCOMTR-MCNC: 208 MG/DL — HIGH (ref 70–99)
GLUCOSE SERPL-MCNC: 223 MG/DL — HIGH (ref 70–99)
GLUCOSE UR QL: 1000
HCT VFR BLD CALC: 47.3 % — SIGNIFICANT CHANGE UP (ref 39–50)
HGB BLD-MCNC: 16.9 G/DL — SIGNIFICANT CHANGE UP (ref 13–17)
KETONES UR-MCNC: NEGATIVE — SIGNIFICANT CHANGE UP
LEUKOCYTE ESTERASE UR-ACNC: NEGATIVE — SIGNIFICANT CHANGE UP
MCHC RBC-ENTMCNC: 32.5 PG — SIGNIFICANT CHANGE UP (ref 27–34)
MCHC RBC-ENTMCNC: 35.6 GM/DL — SIGNIFICANT CHANGE UP (ref 32–36)
MCV RBC AUTO: 91.2 FL — SIGNIFICANT CHANGE UP (ref 80–100)
NITRITE UR-MCNC: NEGATIVE — SIGNIFICANT CHANGE UP
PH UR: 6 — SIGNIFICANT CHANGE UP (ref 5–8)
PLATELET # BLD AUTO: 308 K/UL — SIGNIFICANT CHANGE UP (ref 150–400)
POTASSIUM SERPL-MCNC: 4.4 MMOL/L — SIGNIFICANT CHANGE UP (ref 3.5–5.3)
POTASSIUM SERPL-SCNC: 4.4 MMOL/L — SIGNIFICANT CHANGE UP (ref 3.5–5.3)
PROT SERPL-MCNC: 7.1 G/DL — SIGNIFICANT CHANGE UP (ref 6–8.3)
PROT UR-MCNC: NEGATIVE — SIGNIFICANT CHANGE UP
RBC # BLD: 5.19 M/UL — SIGNIFICANT CHANGE UP (ref 4.2–5.8)
RBC # FLD: 13.3 % — SIGNIFICANT CHANGE UP (ref 10.3–14.5)
SODIUM SERPL-SCNC: 135 MMOL/L — SIGNIFICANT CHANGE UP (ref 135–145)
SP GR SPEC: 1.02 — SIGNIFICANT CHANGE UP (ref 1.01–1.02)
SPECIMEN SOURCE: SIGNIFICANT CHANGE UP
UROBILINOGEN FLD QL: NEGATIVE — SIGNIFICANT CHANGE UP
WBC # BLD: 14.2 K/UL — HIGH (ref 3.8–10.5)
WBC # FLD AUTO: 14.2 K/UL — HIGH (ref 3.8–10.5)

## 2017-12-30 PROCEDURE — 99233 SBSQ HOSP IP/OBS HIGH 50: CPT

## 2017-12-30 RX ADMIN — Medication 25 MILLIGRAM(S): at 17:42

## 2017-12-30 RX ADMIN — Medication 4: at 22:56

## 2017-12-30 RX ADMIN — Medication 1 APPLICATION(S): at 17:41

## 2017-12-30 RX ADMIN — Medication 650 MILLIGRAM(S): at 22:30

## 2017-12-30 RX ADMIN — Medication 2: at 12:52

## 2017-12-30 RX ADMIN — HUMAN INSULIN 4 UNIT(S): 100 INJECTION, SUSPENSION SUBCUTANEOUS at 22:55

## 2017-12-30 RX ADMIN — AMLODIPINE BESYLATE 10 MILLIGRAM(S): 2.5 TABLET ORAL at 21:56

## 2017-12-30 RX ADMIN — LISINOPRIL 40 MILLIGRAM(S): 2.5 TABLET ORAL at 11:07

## 2017-12-30 RX ADMIN — HUMAN INSULIN 4 UNIT(S): 100 INJECTION, SUSPENSION SUBCUTANEOUS at 12:51

## 2017-12-30 RX ADMIN — Medication 650 MILLIGRAM(S): at 21:55

## 2017-12-30 RX ADMIN — HUMAN INSULIN 4 UNIT(S): 100 INJECTION, SUSPENSION SUBCUTANEOUS at 17:41

## 2017-12-30 RX ADMIN — ATORVASTATIN CALCIUM 20 MILLIGRAM(S): 80 TABLET, FILM COATED ORAL at 21:57

## 2017-12-30 RX ADMIN — Medication 25 MILLIGRAM(S): at 05:20

## 2017-12-30 RX ADMIN — ENOXAPARIN SODIUM 40 MILLIGRAM(S): 100 INJECTION SUBCUTANEOUS at 17:41

## 2017-12-30 RX ADMIN — SENNA PLUS 2 TABLET(S): 8.6 TABLET ORAL at 21:57

## 2017-12-30 RX ADMIN — Medication 100 MILLIGRAM(S): at 11:07

## 2017-12-30 RX ADMIN — Medication 1 APPLICATION(S): at 05:21

## 2017-12-30 RX ADMIN — HUMAN INSULIN 4 UNIT(S): 100 INJECTION, SUSPENSION SUBCUTANEOUS at 09:15

## 2017-12-30 NOTE — PROGRESS NOTE ADULT - SUBJECTIVE AND OBJECTIVE BOX
THE PATIENT WAS SEEN AND EXAMINED BY ME WITH THE HOUSESTAFF AND STROKE TEAM DURING MORNING ROUNDS.   HPI:  57 year old male pmhx of htn coming in with 1 day of dizziness prior to admission. Patient presented to ED when the dizziness persisted and was found to have cerebellar hemorrhage and 4th ventricle IVH (Vermian Hemorrhage) for which he was transferred to [facility].   Patient stated he takes no medication and has no pmhx but reportedly has hx of htn.Denies generalized weakness, and nausea. No fall or recent trauma. No numbness, tingling, weakness. No antiplatelets or anticoagulants. Patient currently denies and headache, n/v, blurry vision, double vision, numbness or weakness.     SUBJECTIVE: No events overnight.  No new neurologic complaints.      acetaminophen   Tablet 650 milliGRAM(s) Oral every 6 hours PRN  acetaminophen   Tablet. 650 milliGRAM(s) Oral every 6 hours PRN  amLODIPine   Tablet 10 milliGRAM(s) Oral daily  atorvastatin 20 milliGRAM(s) Oral at bedtime  dextrose 50% Injectable 12.5 Gram(s) IV Push once  dextrose 50% Injectable 25 Gram(s) IV Push once  dextrose 50% Injectable 25 Gram(s) IV Push once  dextrose Gel 1 Dose(s) Oral once PRN  docusate sodium 100 milliGRAM(s) Oral daily  enoxaparin Injectable 40 milliGRAM(s) SubCutaneous <User Schedule>  glucagon  Injectable 1 milliGRAM(s) IntraMuscular once PRN  influenza   Vaccine 0.5 milliLiter(s) IntraMuscular once  insulin lispro (HumaLOG) corrective regimen sliding scale   SubCutaneous Before meals and at bedtime  insulin NPH human recombinant 4 Unit(s) SubCutaneous Before meals and at bedtime  lisinopril 40 milliGRAM(s) Oral daily  metoprolol     tartrate 25 milliGRAM(s) Oral two times a day  senna 2 Tablet(s) Oral at bedtime  triamcinolone 0.1% Ointment 1 Application(s) Topical two times a day      PHYSICAL EXAM:   Vital Signs Last 24 Hrs  T(C): 36.8 (30 Dec 2017 08:08), Max: 36.8 (29 Dec 2017 16:47)  T(F): 98.2 (30 Dec 2017 08:08), Max: 98.2 (29 Dec 2017 16:47)  HR: 59 (30 Dec 2017 08:08) (59 - 70)  BP: 134/77 (30 Dec 2017 08:08) (116/76 - 161/89)  BP(mean): --  RR: 20 (30 Dec 2017 08:08) (17 - 20)  SpO2: 95% (30 Dec 2017 08:08) (95% - 98%)    General: Resting in bed. No acute distress  HEENT: PERRL,  EOM intact, visual fields full    NEUROLOGICAL EXAM:   Mental status: Cantonese speaking, alert, oriented x3. Able to follow simple commands. Speech is fluent but with mild dysarthria   Cranial Nerves: Left facial droop, no nystagmus, no dysarthria, tongue is midline.  Motor exam: Normal tone, no drift. Strength 5/5 throughout.  Sensation: Intact to light touch   Coordination/ Gait: Left ataxia/dysmetria, ataxic gait with mild truncal ataxia     LABS:                        16.9   14.2  )-----------( 308      ( 30 Dec 2017 00:04 )             47.3    12-30    135  |  97  |  19  ----------------------------<  223<H>  4.4   |  26  |  0.69    Ca    9.0      30 Dec 2017 00:04    TPro  7.1  /  Alb  3.8  /  TBili  0.6  /  DBili  0.1  /  AST  28  /  ALT  77<H>  /  AlkPhos  59  12-30    Hemoglobin A1C, Whole Blood: 6.7 % (12-17 @ 06:40)      IMAGING: Reviewed by me.   CT BRAIN (12/19/2017)  History: Follow-up parenchymal hemorrhage. Multiple axial sections were performed from base of skull to vertex without contrast enhancement. This exam is compared with prior noncontrast head CT performed on December 18, 2017. Parenchymal hemorrhage and surrounding edema is again seen in the   posterior fossa midline. This area. Or hemorrhage measures approximately .6 x 3.0 cm and previously measured approximately 3.4 x 3.1 cm. Mass effect on the fourth ventricles again seen and unchanged. The size and configuration of the prominent lateral third ventricles are again seen and unchanged.  Periventricular white matter lucency is again seen and unchanged. Evaluation of the osseous structures with the appropriate window appears unremarkable. The visualized paranasal sinuses mastoid and middle ear regions appear clear    CT Angio Neck w/ IV Cont (12.15.17) CT BRAIN: Acute intra-axial hemorrhage in the cerebellar vermis causing mass effect on the fourth ventricle. Mild prominence of the third and lateral ventricles. Heterogeneous calcified mass in the extracalvarial soft tissues of the right occipital bone.  CTA BRAIN: Patent intracranial circulation. No flow-limiting stenosis or occlusion.   CTA NECK: Patent cervical vasculature. No flow limiting stenosis or occlusion.   Cerebral Angiogram (12.20.17) : Preliminary report normal pial and dural surfaces. No aneurysms. No venous occlusions.  CT Head No Cont (12.28.17 @ 14:22)   IMPRESSION:   Resolving vermian hemorrhage compared with 12/19/2017. No change in mass   effect on the fourth ventricle or rostral hydrocephalus.      VA Duplex Lower Ext Vein Scan, Bilat (12.29.17 @ 16:06)   IMPRESSION:     No evidence of bilateral lower extremity deep venous thrombosis in the   visualized venous segments. THE PATIENT WAS SEEN AND EXAMINED BY ME WITH THE HOUSESTAFF AND STROKE TEAM DURING MORNING ROUNDS.     HPI:  57 year old male pmhx of htn coming in with 1 day of dizziness prior to admission. Patient presented to ED when the dizziness persisted and was found to have cerebellar hemorrhage and 4th ventricle IVH (Vermian Hemorrhage) for which he was transferred to [facility].   Patient stated he takes no medication and has no pmhx but reportedly has hx of htn.Denies generalized weakness, and nausea. No fall or recent trauma. No numbness, tingling, weakness. No antiplatelets or anticoagulants. Patient currently denies and headache, n/v, blurry vision, double vision, numbness or weakness.     SUBJECTIVE: No events overnight.  No new neurologic complaints.      acetaminophen   Tablet 650 milliGRAM(s) Oral every 6 hours PRN  acetaminophen   Tablet. 650 milliGRAM(s) Oral every 6 hours PRN  amLODIPine   Tablet 10 milliGRAM(s) Oral daily  atorvastatin 20 milliGRAM(s) Oral at bedtime  dextrose 50% Injectable 12.5 Gram(s) IV Push once  dextrose 50% Injectable 25 Gram(s) IV Push once  dextrose 50% Injectable 25 Gram(s) IV Push once  dextrose Gel 1 Dose(s) Oral once PRN  docusate sodium 100 milliGRAM(s) Oral daily  enoxaparin Injectable 40 milliGRAM(s) SubCutaneous <User Schedule>  glucagon  Injectable 1 milliGRAM(s) IntraMuscular once PRN  influenza   Vaccine 0.5 milliLiter(s) IntraMuscular once  insulin lispro (HumaLOG) corrective regimen sliding scale   SubCutaneous Before meals and at bedtime  insulin NPH human recombinant 4 Unit(s) SubCutaneous Before meals and at bedtime  lisinopril 40 milliGRAM(s) Oral daily  metoprolol     tartrate 25 milliGRAM(s) Oral two times a day  senna 2 Tablet(s) Oral at bedtime  triamcinolone 0.1% Ointment 1 Application(s) Topical two times a day      PHYSICAL EXAM:   Vital Signs Last 24 Hrs  T(C): 36.8 (30 Dec 2017 08:08), Max: 36.8 (29 Dec 2017 16:47)  T(F): 98.2 (30 Dec 2017 08:08), Max: 98.2 (29 Dec 2017 16:47)  HR: 59 (30 Dec 2017 08:08) (59 - 70)  BP: 134/77 (30 Dec 2017 08:08) (116/76 - 161/89)  BP(mean): --  RR: 20 (30 Dec 2017 08:08) (17 - 20)  SpO2: 95% (30 Dec 2017 08:08) (95% - 98%)    General: Resting in bed. No acute distress  HEENT: PERRL,  EOM intact, visual fields full    NEUROLOGICAL EXAM:   Mental status: Cantonese speaking, alert, oriented x3. Able to follow simple commands. Speech is fluent but with mild dysarthria   Cranial Nerves: Left nasolabial flattening, no nystagmus, no dysarthria, tongue is midline.  Motor exam: Normal tone, no drift. Strength 5/5 throughout.  Sensation: Intact to light touch   Coordination/ Gait: Left ataxia/dysmetria, ataxic gait with mild truncal ataxia     LABS:                        16.9   14.2  )-----------( 308      ( 30 Dec 2017 00:04 )             47.3    12-30    135  |  97  |  19  ----------------------------<  223<H>  4.4   |  26  |  0.69    Ca    9.0      30 Dec 2017 00:04    TPro  7.1  /  Alb  3.8  /  TBili  0.6  /  DBili  0.1  /  AST  28  /  ALT  77<H>  /  AlkPhos  59  12-30    Hemoglobin A1C, Whole Blood: 6.7 % (12-17 @ 06:40)      IMAGING: Reviewed by me.   CT BRAIN (12/19/2017)  History: Follow-up parenchymal hemorrhage. Multiple axial sections were performed from base of skull to vertex without contrast enhancement. This exam is compared with prior noncontrast head CT performed on December 18, 2017. Parenchymal hemorrhage and surrounding edema is again seen in the   posterior fossa midline. This area. Or hemorrhage measures approximately .6 x 3.0 cm and previously measured approximately 3.4 x 3.1 cm. Mass effect on the fourth ventricles again seen and unchanged. The size and configuration of the prominent lateral third ventricles are again seen and unchanged.  Periventricular white matter lucency is again seen and unchanged. Evaluation of the osseous structures with the appropriate window appears unremarkable. The visualized paranasal sinuses mastoid and middle ear regions appear clear    CT Angio Neck w/ IV Cont (12.15.17) CT BRAIN: Acute intra-axial hemorrhage in the cerebellar vermis causing mass effect on the fourth ventricle. Mild prominence of the third and lateral ventricles. Heterogeneous calcified mass in the extracalvarial soft tissues of the right occipital bone.  CTA BRAIN: Patent intracranial circulation. No flow-limiting stenosis or occlusion.   CTA NECK: Patent cervical vasculature. No flow limiting stenosis or occlusion.   Cerebral Angiogram (12.20.17) : Preliminary report normal pial and dural surfaces. No aneurysms. No venous occlusions.  CT Head No Cont (12.28.17 @ 14:22)   IMPRESSION:   Resolving vermian hemorrhage compared with 12/19/2017. No change in mass   effect on the fourth ventricle or rostral hydrocephalus.      VA Duplex Lower Ext Vein Scan, Bilat (12.29.17 @ 16:06)   IMPRESSION:     No evidence of bilateral lower extremity deep venous thrombosis in the   visualized venous segments.

## 2017-12-30 NOTE — PROGRESS NOTE ADULT - ASSESSMENT
ASSESSMENT:   56 Y/O man with vascular risk factors of age and hypertension reports to have noticed one day of dizziness prior to admission. Patient presented to ED when the dizziness persisted and was found to have cerebellar hemorrhage and 4th ventricle IVH (Vermian Hemorrhage) for which he was transferred to [facility]. MRI brain showed stable paramedian/vermian cerebellar ICH as well as evidence of multiple old microhemorrhages predominantly affecting deep  territories and moderate leukoaraiosis and punctate areas of restriction diffusion involving left frontal and left parieto-occipital region. CTA head on admission as well as Conventional angiogram performed subsequently did not show any evidence of vascular malformation being the etiology of his ICH. Repeat CT brain on 12/28 showed expected evolution of vermian hemorrhage with stable mass effect and effacement of the fourth ventricle without any worsening hydrocephalus as compared to previous brain imaging.    Impression:  Nontraumatic cerebellar ICH. Paramedian/vermian cerebellar intracerebral hemorrhage with associated IVH - likely etiology being microangiopathy related to chronic hypertension leading to hypertensive intracerebral hemorrhage   Small punctate area is of diffusion restriction involving the left frontal and left parietal occipital region - likely etiology being small punctate areas of restriction diffusion associated with intracerebral hemorrhage, as described in the literature   Associated cerebral edema with mass effect and brain compression leading to effacement of the fourth ventricle with stable hydrocephalus    NEURO: Neurologically improved vs admission but remains with significant gait instability, stable cerebral edema with mass effect and brain compression, given vomiting will repeat CTH,  s/p cerebral angiography 12/20: demonstrated normal pial and dural surfaces. No aneurysms. No venous occlusions BP goal - gradual normotension. Atorvastatin 20 mg at bedtime considering likely newly diagnosed diabetes and 10 years ASCVD risk being 10.4%, Plan to obtain repeat neuroimaging in 4-6 weeks to r/o underlying pathology. PMR for AR.     ANTITHROMBOTIC THERAPY: None in the setting of ICH and no specific indications at this time    PULMONARY: Protecting airway, saturating well on room air.     CARDIOVASCULAR: TTE - unremarkable and no evidence of LVH, Continue cardiac monitoring, slowly titrate antihypertensive regimen accordingly. Obtain orthostatics.     SBP goal: Gradual normotension     GASTROINTESTINAL: dysphagia screen passed. Tolerating diet      Diet: Regular. Consistent Carb.    RENAL: maintain adequate hydration, good urine output, avoid hyponatremia     Na Goal: Greater than 140     Bray: n        HEMATOLOGY: no active bleeding     DVT ppx: LMWH      ID: afebrile, leukocytosis, pan-Cx ordered, pending result, continue encourage incentive spirometry as tolerated. Lower extremity duplex negative for DVT. LFTs to evaluate for up trending white count    Other: Small area of ecchymosis but not hematoma at puncture site with 2+ DP pulses.  Upon closer exam, aubrie rash noted on thighs and encroaching the right groin and abdomen, continue Cetirizine and benadryl, most likely allergic response to contrast.  Appreciate derm consult. Plan was discussed with patient and available family member at bedside in detail through the  phone.     DISPOSITION: Discharge to home with family once optimized from PT/OT standpoint, yesterday the sister felt that she needs more guidance and may not be able to care for him at home, PT will continue to work with her - social work aware.      CORE MEASURES:        Admission NIHSS: No     TPA: NO      LDL/HDL: NO     Depression Screen: 0     Statin Therapy: NO     Dysphagia Screen: PASS     Smoking  NO      Afib  NO     Stroke Education YES ASSESSMENT:   56 Y/O man with vascular risk factors of age and hypertension reports to have noticed one day of dizziness prior to admission. Patient presented to ED when the dizziness persisted and was found to have cerebellar hemorrhage and 4th ventricle IVH (Vermian Hemorrhage) for which he was transferred to [facility]. MRI brain showed stable paramedian/vermian cerebellar ICH as well as evidence of multiple old microhemorrhages predominantly affecting deep  territories and moderate leukoaraiosis and punctate areas of restriction diffusion involving left frontal and left parieto-occipital region. CTA head on admission as well as Conventional angiogram performed subsequently did not show any evidence of vascular malformation being the etiology of his ICH. Repeat CT brain on 12/28 showed expected evolution of vermian hemorrhage with stable mass effect and effacement of the fourth ventricle without any worsening hydrocephalus as compared to previous brain imaging.    Impression:  Nontraumatic cerebellar ICH. Paramedian/vermian cerebellar intracerebral hemorrhage with associated IVH - likely etiology being microangiopathy related to chronic hypertension leading to hypertensive intracerebral hemorrhage   Small punctate area is of diffusion restriction involving the left frontal and left parietal occipital region - likely etiology being small punctate areas of restriction diffusion associated with intracerebral hemorrhage, as described in the literature   Associated cerebral edema with mass effect and brain compression leading to effacement of the fourth ventricle with stable hydrocephalus    NEURO: Neurologically improved  vs admission but remains with significant gait instability, stable cerebral edema with mass effect and brain compression, given vomiting will repeat CTH,  s/p cerebral angiography 12/20: demonstrated normal pial and dural surfaces. No aneurysms. No venous occlusions BP goal - gradual normotension. Atorvastatin 20 mg at bedtime considering likely newly diagnosed diabetes and 10 years ASCVD risk being 10.4%, Plan to obtain repeat neuroimaging in 4-6 weeks to r/o underlying pathology. PMR for AR.     ANTITHROMBOTIC THERAPY: None in the setting of ICH and no specific indications at this time    PULMONARY: Protecting airway, saturating well on room air.     CARDIOVASCULAR: TTE - unremarkable and no evidence of LVH, Continue cardiac monitoring, slowly titrate antihypertensive regimen accordingly. Obtain orthostatics.     SBP goal: Gradual normotension     GASTROINTESTINAL: dysphagia screen passed. Tolerating diet      Diet: Regular. Consistent Carb.    RENAL: maintain adequate hydration, good urine output, avoid hyponatremia     Na Goal: Greater than 140     Bray: n        HEMATOLOGY: no active bleeding     DVT ppx: LMWH      ID: afebrile, leukocytosis, pan-Cx ordered, pending result, continue encourage incentive spirometry as tolerated. Lower extremity duplex negative for DVT. LFTs to evaluate for up trending white count    Other: Small area of ecchymosis but not hematoma at puncture site with 2+ DP pulses.  Upon closer exam, aubrie rash noted on thighs and encroaching the right groin and abdomen, continue Cetirizine and benadryl, most likely allergic response to contrast.  Appreciate derm consult. Plan was discussed with patient and available family member at bedside in detail through the  phone.     DISPOSITION: Discharge to home with family once optimized from PT/OT standpoint, arrangements have been made for him to go back to [facility] for [facility], spoke with WhittakerPanfilo Corona [phone] who is in charge of making these arrangements for family and explained that the patient does not have insurance for additional therapy outside of hospital and that family would have to pay out of pocket.  Awaiting call back with plans of returning to [facility].      CORE MEASURES:        Admission NIHSS: No     TPA: NO      LDL/HDL: NO     Depression Screen: 0     Statin Therapy: NO     Dysphagia Screen: PASS     Smoking  NO      Afib  NO     Stroke Education YES

## 2017-12-31 LAB
GLUCOSE BLDC GLUCOMTR-MCNC: 122 MG/DL — HIGH (ref 70–99)
GLUCOSE BLDC GLUCOMTR-MCNC: 128 MG/DL — HIGH (ref 70–99)
GLUCOSE BLDC GLUCOMTR-MCNC: 187 MG/DL — HIGH (ref 70–99)
GLUCOSE BLDC GLUCOMTR-MCNC: 197 MG/DL — HIGH (ref 70–99)

## 2017-12-31 PROCEDURE — 99233 SBSQ HOSP IP/OBS HIGH 50: CPT

## 2017-12-31 RX ADMIN — Medication 2: at 17:40

## 2017-12-31 RX ADMIN — Medication 650 MILLIGRAM(S): at 21:05

## 2017-12-31 RX ADMIN — Medication 650 MILLIGRAM(S): at 06:45

## 2017-12-31 RX ADMIN — HUMAN INSULIN 4 UNIT(S): 100 INJECTION, SUSPENSION SUBCUTANEOUS at 09:57

## 2017-12-31 RX ADMIN — HUMAN INSULIN 4 UNIT(S): 100 INJECTION, SUSPENSION SUBCUTANEOUS at 21:22

## 2017-12-31 RX ADMIN — Medication 650 MILLIGRAM(S): at 06:14

## 2017-12-31 RX ADMIN — ATORVASTATIN CALCIUM 20 MILLIGRAM(S): 80 TABLET, FILM COATED ORAL at 21:04

## 2017-12-31 RX ADMIN — Medication 2: at 12:55

## 2017-12-31 RX ADMIN — Medication 25 MILLIGRAM(S): at 17:40

## 2017-12-31 RX ADMIN — LISINOPRIL 40 MILLIGRAM(S): 2.5 TABLET ORAL at 11:28

## 2017-12-31 RX ADMIN — HUMAN INSULIN 4 UNIT(S): 100 INJECTION, SUSPENSION SUBCUTANEOUS at 12:55

## 2017-12-31 RX ADMIN — Medication 1 APPLICATION(S): at 11:31

## 2017-12-31 RX ADMIN — AMLODIPINE BESYLATE 10 MILLIGRAM(S): 2.5 TABLET ORAL at 21:04

## 2017-12-31 RX ADMIN — ENOXAPARIN SODIUM 40 MILLIGRAM(S): 100 INJECTION SUBCUTANEOUS at 17:40

## 2017-12-31 RX ADMIN — Medication 100 MILLIGRAM(S): at 11:28

## 2017-12-31 RX ADMIN — Medication 25 MILLIGRAM(S): at 06:14

## 2017-12-31 RX ADMIN — HUMAN INSULIN 4 UNIT(S): 100 INJECTION, SUSPENSION SUBCUTANEOUS at 17:40

## 2017-12-31 RX ADMIN — Medication 1 APPLICATION(S): at 17:41

## 2017-12-31 RX ADMIN — SENNA PLUS 2 TABLET(S): 8.6 TABLET ORAL at 21:04

## 2017-12-31 RX ADMIN — Medication 650 MILLIGRAM(S): at 17:39

## 2017-12-31 NOTE — PROGRESS NOTE ADULT - ASSESSMENT
ASSESSMENT:   56 Y/O man with vascular risk factors of age and hypertension reports to have noticed one day of dizziness prior to admission. Patient presented to ED when the dizziness persisted and was found to have cerebellar hemorrhage and 4th ventricle IVH (Vermian Hemorrhage) for which he was transferred to [facility]. MRI brain showed stable paramedian/vermian cerebellar ICH as well as evidence of multiple old microhemorrhages predominantly affecting deep  territories and moderate leukoaraiosis and punctate areas of restriction diffusion involving left frontal and left parieto-occipital region. CTA head on admission as well as Conventional angiogram performed subsequently did not show any evidence of vascular malformation being the etiology of his ICH. Repeat CT brain on 12/28 showed expected evolution of vermian hemorrhage with stable mass effect and effacement of the fourth ventricle without any worsening hydrocephalus as compared to previous brain imaging.    Impression:  Nontraumatic cerebellar ICH. Paramedian/vermian cerebellar intracerebral hemorrhage with associated IVH - likely etiology being microangiopathy related to chronic hypertension leading to hypertensive intracerebral hemorrhage   Small punctate area is of diffusion restriction involving the left frontal and left parietal occipital region - likely etiology being small punctate areas of restriction diffusion associated with intracerebral hemorrhage, as described in the literature   Associated cerebral edema with mass effect and brain compression leading to effacement of the fourth ventricle with stable hydrocephalus    NEURO: Neurologically improved  vs admission but remains with significant gait instability, stable cerebral edema with mass effect and brain compression, given vomiting will repeat CTH,  s/p cerebral angiography 12/20: demonstrated normal pial and dural surfaces. No aneurysms. No venous occlusions BP goal - gradual normotension. Atorvastatin 20 mg at bedtime considering likely newly diagnosed diabetes and 10 years ASCVD risk being 10.4%, Plan to obtain repeat neuroimaging in 4-6 weeks to r/o underlying pathology. PMR for AR.     ANTITHROMBOTIC THERAPY: None in the setting of ICH and no specific indications at this time    PULMONARY: Protecting airway, saturating well on room air.     CARDIOVASCULAR: TTE - unremarkable and no evidence of LVH, Continue cardiac monitoring, slowly titrate antihypertensive regimen accordingly. Obtain orthostatics.     SBP goal: Gradual normotension     GASTROINTESTINAL: dysphagia screen passed. Tolerating diet      Diet: Regular. Consistent Carb.    RENAL: maintain adequate hydration, good urine output, avoid hyponatremia     Na Goal: Greater than 140     Bray: n        HEMATOLOGY: no active bleeding     DVT ppx: LMWH      ID: afebrile, leukocytosis 12/30, pan-Cx ordered-negative to date, continue encourage incentive spirometry as tolerated. Lower extremity duplex negative for DVT. No s/s of infection    Other: Small area of ecchymosis but not hematoma at puncture site with 2+ DP pulses.  Upon closer exam, aubrie rash noted on thighs and encroaching the right groin and abdomen, continue Cetirizine and benadryl, most likely allergic response to contrast.  Appreciate derm consult. Plan was discussed with patient and available family member at bedside in detail through the  phone.     DISPOSITION: Discharge to home with family once optimized from PT/OT standpoint, arrangements have been made for him to go back to [facility] for [place], spoke with Javon Corona [phone] who is in charge of making these arrangements for family on 12/30 and explained that the patient does not have insurance for additional therapy outside of hospital and that family would have to pay out of pocket.  Awaiting call back with plans of returning to [facility]. PT to continue training family so he may be discharged to family with 24hr supervision due to his gait instability.      CORE MEASURES:        Admission NIHSS: No     TPA: NO      LDL/HDL: NO     Depression Screen: 0     Statin Therapy: NO     Dysphagia Screen: PASS     Smoking  NO      Afib  NO     Stroke Education YES ASSESSMENT:   56 Y/O man with vascular risk factors of age and hypertension reports to have noticed one day of dizziness prior to admission. Patient presented to ED when the dizziness persisted and was found to have cerebellar hemorrhage and 4th ventricle IVH (Vermian Hemorrhage) for which he was transferred to [facility]. MRI brain showed stable paramedian/vermian cerebellar ICH as well as evidence of multiple old microhemorrhages predominantly affecting deep  territories and moderate leukoaraiosis and punctate areas of restriction diffusion involving left frontal and left parieto-occipital region. CTA head on admission as well as Conventional angiogram performed subsequently did not show any evidence of vascular malformation being the etiology of his ICH. Repeat CT brain on 12/28 showed expected evolution of vermian hemorrhage with stable mass effect and effacement of the fourth ventricle without any worsening hydrocephalus as compared to previous brain imaging.    Impression:  Nontraumatic cerebellar ICH. Paramedian/vermian cerebellar intracerebral hemorrhage with associated IVH - likely etiology being microangiopathy related to chronic hypertension leading to hypertensive intracerebral hemorrhage   Small punctate area is of diffusion restriction involving the left frontal and left parietal occipital region - likely etiology being small punctate areas of restriction diffusion associated with intracerebral hemorrhage, as described in the literature   Associated cerebral edema with mass effect and brain compression leading to effacement of the fourth ventricle with stable hydrocephalus    NEURO: Neurologically improved vs admission but remains with significant gait instability, stable cerebral edema with mass effect and brain compression, given vomiting will repeat CTH,  s/p cerebral angiography 12/20: demonstrated normal pial and dural surfaces. No aneurysms. No venous occlusions BP goal - gradual normotension. Atorvastatin 20 mg at bedtime considering likely newly diagnosed diabetes and 10 years ASCVD risk being 10.4%, Plan to obtain repeat neuroimaging in 4-6 weeks to r/o underlying pathology. PMR for AR.     ANTITHROMBOTIC THERAPY: None in the setting of ICH and no specific indications at this time    PULMONARY: Protecting airway, saturating well on room air.     CARDIOVASCULAR: TTE - unremarkable and no evidence of LVH, Continue cardiac monitoring, slowly titrate antihypertensive regimen accordingly.     SBP goal: Gradual normotension     GASTROINTESTINAL: dysphagia screen passed. Tolerating diet      Diet: Regular. Consistent Carb.    RENAL: maintain adequate hydration, good urine output, avoid hyponatremia     Na Goal: Greater than 140     Bray: n        HEMATOLOGY: no active bleeding     DVT ppx: LMWH      ID: afebrile, leukocytosis 12/30, pan-Cx ordered-negative to date, continue encourage incentive spirometry as tolerated. Lower extremity duplex negative for DVT. No si/sx of infection    Other: Small area of ecchymosis but not hematoma at puncture site with 2+ DP pulses.  Upon closer exam, aubrie rash noted on thighs and encroaching the right groin and abdomen, continue Cetirizine and benadryl, most likely allergic response to contrast.  Appreciate derm consult. Plan was discussed with patient and available family member at bedside in detail through the  phone.     DISPOSITION: Discharge to home with family once optimized from PT/OT standpoint, arrangements have been made for him to go back to [facility] for [place], spoke with Javon Corona [phone] who is in charge of making these arrangements for family on 12/30 and explained that the patient does not have insurance for additional therapy outside of hospital and that family would have to pay out of pocket.  Awaiting call back with plans of returning to [facility]. PT to continue training family so he may be discharged to family with 24hr supervision due to his gait instability.      CORE MEASURES:        Admission NIHSS: No     TPA: NO      LDL/HDL: NO     Depression Screen: 0     Statin Therapy: NO     Dysphagia Screen: PASS     Smoking  NO      Afib  NO     Stroke Education YES

## 2017-12-31 NOTE — PROVIDER CONTACT NOTE (OTHER) - ASSESSMENT
Patient Mandarian Speaking,  phone utilized. Patient disoriented to time and situation. States incorrect age, month, year. Patient with episode of incontinence

## 2017-12-31 NOTE — PROGRESS NOTE ADULT - SUBJECTIVE AND OBJECTIVE BOX
THE PATIENT WAS SEEN AND EXAMINED BY ME WITH THE HOUSESTAFF AND STROKE TEAM DURING MORNING ROUNDS.   HPI:  57 year old male pmhx of htn coming in with 1 day of dizziness found to have vermian hemorrhage. Patient states he takes no medication and has no pmhx but reportedly has hx of htn. Was transferred from [facility] for spontaneous IPH. Pt started to have dizzy and lightheaded when bedning over yesterday. Patient staes that dizziness did not go away this morning so he went ot the hospital. Denies generalized weakness, and nausea. No fall or recent trauma. No numbness, tingling, weakness. No antiplatelets or anticoagulants. Patient currently denies and headache, n/v, blurry vision, double vision, numbness or weakness. (15 Dec 2017 18:58)      SUBJECTIVE: No events overnight.  No new neurologic complaints.  Sister at bedside stating he seems better today    acetaminophen   Tablet 650 milliGRAM(s) Oral every 6 hours PRN  acetaminophen   Tablet. 650 milliGRAM(s) Oral every 6 hours PRN  amLODIPine   Tablet 10 milliGRAM(s) Oral daily  atorvastatin 20 milliGRAM(s) Oral at bedtime  dextrose 50% Injectable 12.5 Gram(s) IV Push once  dextrose 50% Injectable 25 Gram(s) IV Push once  dextrose 50% Injectable 25 Gram(s) IV Push once  dextrose Gel 1 Dose(s) Oral once PRN  docusate sodium 100 milliGRAM(s) Oral daily  enoxaparin Injectable 40 milliGRAM(s) SubCutaneous <User Schedule>  glucagon  Injectable 1 milliGRAM(s) IntraMuscular once PRN  influenza   Vaccine 0.5 milliLiter(s) IntraMuscular once  insulin lispro (HumaLOG) corrective regimen sliding scale   SubCutaneous Before meals and at bedtime  insulin NPH human recombinant 4 Unit(s) SubCutaneous Before meals and at bedtime  lisinopril 40 milliGRAM(s) Oral daily  metoprolol     tartrate 25 milliGRAM(s) Oral two times a day  senna 2 Tablet(s) Oral at bedtime  triamcinolone 0.1% Ointment 1 Application(s) Topical two times a day      PHYSICAL EXAM:   Vital Signs Last 24 Hrs  T(C): 37 (31 Dec 2017 12:42), Max: 37 (30 Dec 2017 23:16)  T(F): 98.6 (31 Dec 2017 12:42), Max: 98.6 (30 Dec 2017 23:16)  HR: 64 (31 Dec 2017 12:42) (57 - 76)  BP: 156/82 (31 Dec 2017 12:42) (117/75 - 161/77)  BP(mean): --  RR: 18 (31 Dec 2017 12:42) (18 - 20)  SpO2: 97% (31 Dec 2017 12:42) (94% - 97%)    General: Resting in bed. No acute distress  HEENT: PERRL,  EOM intact, visual fields full    NEUROLOGICAL EXAM:   Mental status: Cantonese speaking, alert, oriented x3, (stated correct birthday but wrong age). Able to follow simple commands. Speech is fluent but with mild dysarthria   Cranial Nerves: Left nasolabial flattening, no nystagmus, no dysarthria, tongue is midline.  Motor exam: Normal tone, no drift. Strength 5/5 throughout.  Sensation: Intact to light touch   Coordination/ Gait: Left ataxia/dysmetria, ataxic gait with mild truncal ataxia     LABS:                        16.9   14.2  )-----------( 308      ( 30 Dec 2017 00:04 )             47.3    12-30    135  |  97  |  19  ----------------------------<  223<H>  4.4   |  26  |  0.69    Ca    9.0      30 Dec 2017 00:04    TPro  7.1  /  Alb  3.8  /  TBili  0.6  /  DBili  0.1  /  AST  28  /  ALT  77<H>  /  AlkPhos  59  12-30    Hemoglobin A1C, Whole Blood: 6.7 % (12-17 @ 06:40)      IMAGING: Reviewed by me.   CT BRAIN (12/19/2017)  History: Follow-up parenchymal hemorrhage. Multiple axial sections were performed from base of skull to vertex without contrast enhancement. This exam is compared with prior noncontrast head CT performed on December 18, 2017. Parenchymal hemorrhage and surrounding edema is again seen in the   posterior fossa midline. This area. Or hemorrhage measures approximately .6 x 3.0 cm and previously measured approximately 3.4 x 3.1 cm. Mass effect on the fourth ventricles again seen and unchanged. The size and configuration of the prominent lateral third ventricles are again seen and unchanged.  Periventricular white matter lucency is again seen and unchanged. Evaluation of the osseous structures with the appropriate window appears unremarkable. The visualized paranasal sinuses mastoid and middle ear regions appear clear    CT Angio Neck w/ IV Cont (12.15.17) CT BRAIN: Acute intra-axial hemorrhage in the cerebellar vermis causing mass effect on the fourth ventricle. Mild prominence of the third and lateral ventricles. Heterogeneous calcified mass in the extracalvarial soft tissues of the right occipital bone.  CTA BRAIN: Patent intracranial circulation. No flow-limiting stenosis or occlusion.   CTA NECK: Patent cervical vasculature. No flow limiting stenosis or occlusion.   Cerebral Angiogram (12.20.17) : Preliminary report normal pial and dural surfaces. No aneurysms. No venous occlusions.  CT Head No Cont (12.28.17 @ 14:22)   IMPRESSION:   Resolving vermian hemorrhage compared with 12/19/2017. No change in mass   effect on the fourth ventricle or rostral hydrocephalus.      VA Duplex Lower Ext Vein Scan, Bilat (12.29.17 @ 16:06)   IMPRESSION:     No evidence of bilateral lower extremity deep venous thrombosis in the   visualized venous segments. THE PATIENT WAS SEEN AND EXAMINED BY ME WITH THE HOUSESTAFF AND STROKE TEAM DURING MORNING ROUNDS.     HPI:  57 year old male pmhx of htn coming in with 1 day of dizziness found to have vermian hemorrhage. Patient states he takes no medication and has no pmhx but reportedly has hx of htn. Was transferred from [facility] for spontaneous IPH. Pt started to have dizzy and lightheaded when bedning over yesterday. Patient staes that dizziness did not go away this morning so he went ot the hospital. Denies generalized weakness, and nausea. No fall or recent trauma. No numbness, tingling, weakness. No antiplatelets or anticoagulants. Patient currently denies and headache, n/v, blurry vision, double vision, numbness or weakness. (15 Dec 2017 18:58)    SUBJECTIVE: No events overnight.  No new neurologic complaints.  Sister at bedside stating he seems better today    acetaminophen   Tablet 650 milliGRAM(s) Oral every 6 hours PRN  acetaminophen   Tablet. 650 milliGRAM(s) Oral every 6 hours PRN  amLODIPine   Tablet 10 milliGRAM(s) Oral daily  atorvastatin 20 milliGRAM(s) Oral at bedtime  dextrose 50% Injectable 12.5 Gram(s) IV Push once  dextrose 50% Injectable 25 Gram(s) IV Push once  dextrose 50% Injectable 25 Gram(s) IV Push once  dextrose Gel 1 Dose(s) Oral once PRN  docusate sodium 100 milliGRAM(s) Oral daily  enoxaparin Injectable 40 milliGRAM(s) SubCutaneous <User Schedule>  glucagon  Injectable 1 milliGRAM(s) IntraMuscular once PRN  influenza   Vaccine 0.5 milliLiter(s) IntraMuscular once  insulin lispro (HumaLOG) corrective regimen sliding scale   SubCutaneous Before meals and at bedtime  insulin NPH human recombinant 4 Unit(s) SubCutaneous Before meals and at bedtime  lisinopril 40 milliGRAM(s) Oral daily  metoprolol     tartrate 25 milliGRAM(s) Oral two times a day  senna 2 Tablet(s) Oral at bedtime  triamcinolone 0.1% Ointment 1 Application(s) Topical two times a day    PHYSICAL EXAM:   Vital Signs Last 24 Hrs  T(C): 37 (31 Dec 2017 12:42), Max: 37 (30 Dec 2017 23:16)  T(F): 98.6 (31 Dec 2017 12:42), Max: 98.6 (30 Dec 2017 23:16)  HR: 64 (31 Dec 2017 12:42) (57 - 76)  BP: 156/82 (31 Dec 2017 12:42) (117/75 - 161/77)  BP(mean): --  RR: 18 (31 Dec 2017 12:42) (18 - 20)  SpO2: 97% (31 Dec 2017 12:42) (94% - 97%)    General: Resting in bed. No acute distress  HEENT: PERRL,  EOM intact, visual fields full    NEUROLOGICAL EXAM:   Mental status: Cantonese speaking, alert, oriented x3, (stated correct birthday but wrong age). Able to follow simple commands. Speech is fluent but with mild dysarthria   Cranial Nerves: Left nasolabial flattening, no nystagmus, no dysarthria, tongue is midline.  Motor exam: Normal tone, no drift. Strength 5/5 throughout.  Sensation: Intact to light touch   Coordination/ Gait: Left ataxia/dysmetria, ataxic gait with mild truncal ataxia     LABS:                        16.9   14.2  )-----------( 308      ( 30 Dec 2017 00:04 )             47.3    12-30    135  |  97  |  19  ----------------------------<  223<H>  4.4   |  26  |  0.69    Ca    9.0      30 Dec 2017 00:04    TPro  7.1  /  Alb  3.8  /  TBili  0.6  /  DBili  0.1  /  AST  28  /  ALT  77<H>  /  AlkPhos  59  12-30    Hemoglobin A1C, Whole Blood: 6.7 % (12-17 @ 06:40)      IMAGING: Reviewed by me.   CT BRAIN (12/19/2017)  History: Follow-up parenchymal hemorrhage. Multiple axial sections were performed from base of skull to vertex without contrast enhancement. This exam is compared with prior noncontrast head CT performed on December 18, 2017. Parenchymal hemorrhage and surrounding edema is again seen in the   posterior fossa midline. This area. Or hemorrhage measures approximately .6 x 3.0 cm and previously measured approximately 3.4 x 3.1 cm. Mass effect on the fourth ventricles again seen and unchanged. The size and configuration of the prominent lateral third ventricles are again seen and unchanged.  Periventricular white matter lucency is again seen and unchanged. Evaluation of the osseous structures with the appropriate window appears unremarkable. The visualized paranasal sinuses mastoid and middle ear regions appear clear    CT Angio Neck w/ IV Cont (12.15.17) CT BRAIN: Acute intra-axial hemorrhage in the cerebellar vermis causing mass effect on the fourth ventricle. Mild prominence of the third and lateral ventricles. Heterogeneous calcified mass in the extracalvarial soft tissues of the right occipital bone.  CTA BRAIN: Patent intracranial circulation. No flow-limiting stenosis or occlusion.   CTA NECK: Patent cervical vasculature. No flow limiting stenosis or occlusion.   Cerebral Angiogram (12.20.17) : Preliminary report normal pial and dural surfaces. No aneurysms. No venous occlusions.  CT Head No Cont (12.28.17 @ 14:22)   IMPRESSION:   Resolving vermian hemorrhage compared with 12/19/2017. No change in mass   effect on the fourth ventricle or rostral hydrocephalus.      VA Duplex Lower Ext Vein Scan, Bilat (12.29.17 @ 16:06)   IMPRESSION:     No evidence of bilateral lower extremity deep venous thrombosis in the   visualized venous segments.

## 2017-12-31 NOTE — PROVIDER CONTACT NOTE (OTHER) - SITUATION
Patient disoriented to time and situation with incontinent episode, previously A&Ox3-4 and continent

## 2018-01-01 LAB
GLUCOSE BLDC GLUCOMTR-MCNC: 133 MG/DL — HIGH (ref 70–99)
GLUCOSE BLDC GLUCOMTR-MCNC: 134 MG/DL — HIGH (ref 70–99)
GLUCOSE BLDC GLUCOMTR-MCNC: 139 MG/DL — HIGH (ref 70–99)
GLUCOSE BLDC GLUCOMTR-MCNC: 222 MG/DL — HIGH (ref 70–99)

## 2018-01-01 PROCEDURE — 99233 SBSQ HOSP IP/OBS HIGH 50: CPT

## 2018-01-01 RX ORDER — METOPROLOL TARTRATE 50 MG
25 TABLET ORAL DAILY
Qty: 0 | Refills: 0 | Status: DISCONTINUED | OUTPATIENT
Start: 2018-01-01 | End: 2018-01-07

## 2018-01-01 RX ADMIN — HUMAN INSULIN 4 UNIT(S): 100 INJECTION, SUSPENSION SUBCUTANEOUS at 22:02

## 2018-01-01 RX ADMIN — LISINOPRIL 40 MILLIGRAM(S): 2.5 TABLET ORAL at 12:44

## 2018-01-01 RX ADMIN — HUMAN INSULIN 4 UNIT(S): 100 INJECTION, SUSPENSION SUBCUTANEOUS at 17:47

## 2018-01-01 RX ADMIN — Medication 4: at 17:47

## 2018-01-01 RX ADMIN — Medication 25 MILLIGRAM(S): at 07:25

## 2018-01-01 RX ADMIN — AMLODIPINE BESYLATE 10 MILLIGRAM(S): 2.5 TABLET ORAL at 22:02

## 2018-01-01 RX ADMIN — Medication 30 MILLILITER(S): at 01:02

## 2018-01-01 RX ADMIN — ATORVASTATIN CALCIUM 20 MILLIGRAM(S): 80 TABLET, FILM COATED ORAL at 22:02

## 2018-01-01 RX ADMIN — Medication 25 MILLIGRAM(S): at 12:44

## 2018-01-01 RX ADMIN — HUMAN INSULIN 4 UNIT(S): 100 INJECTION, SUSPENSION SUBCUTANEOUS at 12:44

## 2018-01-01 RX ADMIN — ENOXAPARIN SODIUM 40 MILLIGRAM(S): 100 INJECTION SUBCUTANEOUS at 17:49

## 2018-01-01 RX ADMIN — Medication 1 APPLICATION(S): at 07:27

## 2018-01-01 RX ADMIN — Medication 1 APPLICATION(S): at 17:49

## 2018-01-01 RX ADMIN — Medication 650 MILLIGRAM(S): at 23:44

## 2018-01-01 RX ADMIN — Medication 100 MILLIGRAM(S): at 12:44

## 2018-01-01 RX ADMIN — HUMAN INSULIN 4 UNIT(S): 100 INJECTION, SUSPENSION SUBCUTANEOUS at 09:10

## 2018-01-01 RX ADMIN — SENNA PLUS 2 TABLET(S): 8.6 TABLET ORAL at 22:02

## 2018-01-01 NOTE — PROGRESS NOTE ADULT - SUBJECTIVE AND OBJECTIVE BOX
THE PATIENT WAS SEEN AND EXAMINED BY ME WITH THE HOUSESTAFF AND STROKE TEAM DURING MORNING ROUNDS.   HPI:  57 year old male pmhx of htn coming in with 1 day of dizziness prior to admission. 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  Patient stated he takes no medication and has no pmhx but reportedly has hx of htn.Denied generalized weakness, and nausea. No fall or recent trauma. No numbness, tingling, weakness. No antiplatelets or anticoagulants. Patient  denied and headache, n/v, blurry vision, double vision, numbness or weakness.       SUBJECTIVE: No events overnight.  No new neurologic complaints.      acetaminophen   Tablet 650 milliGRAM(s) Oral every 6 hours PRN  acetaminophen   Tablet. 650 milliGRAM(s) Oral every 6 hours PRN  aluminum hydroxide/magnesium hydroxide/simethicone Suspension 30 milliLiter(s) Oral every 6 hours PRN  amLODIPine   Tablet 10 milliGRAM(s) Oral daily  atorvastatin 20 milliGRAM(s) Oral at bedtime  dextrose 50% Injectable 12.5 Gram(s) IV Push once  dextrose 50% Injectable 25 Gram(s) IV Push once  dextrose 50% Injectable 25 Gram(s) IV Push once  dextrose Gel 1 Dose(s) Oral once PRN  docusate sodium 100 milliGRAM(s) Oral daily  enoxaparin Injectable 40 milliGRAM(s) SubCutaneous <User Schedule>  glucagon  Injectable 1 milliGRAM(s) IntraMuscular once PRN  influenza   Vaccine 0.5 milliLiter(s) IntraMuscular once  insulin lispro (HumaLOG) corrective regimen sliding scale   SubCutaneous Before meals and at bedtime  insulin NPH human recombinant 4 Unit(s) SubCutaneous Before meals and at bedtime  lisinopril 40 milliGRAM(s) Oral daily  metoprolol     tartrate 25 milliGRAM(s) Oral two times a day  senna 2 Tablet(s) Oral at bedtime  triamcinolone 0.1% Ointment 1 Application(s) Topical two times a day      PHYSICAL EXAM:   Vital Signs Last 24 Hrs  T(C): 36.4 (01 Jan 2018 07:40), Max: 37 (31 Dec 2017 12:42)  T(F): 97.5 (01 Jan 2018 07:40), Max: 98.6 (31 Dec 2017 12:42)  HR: 66 (01 Jan 2018 07:40) (63 - 72)  BP: 132/81 (01 Jan 2018 07:40) (132/81 - 161/98)  BP(mean): --  RR: 18 (01 Jan 2018 07:40) (18 - 20)  SpO2: 96% (01 Jan 2018 07:40) (94% - 97%)    General: Resting in bed. No acute distress  HEENT: PERRL,  EOM intact, visual fields full    NEUROLOGICAL EXAM:   Mental status: Cantonese speaking, alert, oriented x3. Able to follow simple commands. Speech is fluent but with mild dysarthria   Cranial Nerves: Left facial droop, no nystagmus, no dysarthria, tongue is midline.  Motor exam: Normal tone, no drift. Strength 5/5 throughout.  Sensation: Intact to light touch   Coordination/ Gait: Bilateral ataxia/dysmetria, worse on the left, Ataxic gait with mild truncal ataxia     LABS:         Hemoglobin A1C, Whole Blood: 6.7 % (12-17 @ 06:40)      IMAGING: Reviewed by me.     CT BRAIN (12/19/2017)  History: Follow-up parenchymal hemorrhage. Multiple axial sections were performed from base of skull to vertex without contrast enhancement. This exam is compared with prior noncontrast head CT performed on December 18, 2017. Parenchymal hemorrhage and surrounding edema is again seen in the   posterior fossa midline. This area. Or hemorrhage measures approximately .6 x 3.0 cm and previously measured approximately 3.4 x 3.1 cm. Mass effect on the fourth ventricles again seen and unchanged. The size and configuration of the prominent lateral third ventricles are again seen and unchanged.  Periventricular white matter lucency is again seen and unchanged. Evaluation of the osseous structures with the appropriate window appears unremarkable. The visualized paranasal sinuses mastoid and middle ear regions appear clear    CT Angio Neck w/ IV Cont (12.15.17) CT BRAIN: Acute intra-axial hemorrhage in the cerebellar vermis causing mass effect on the fourth ventricle. Mild prominence of the third and lateral ventricles. Heterogeneous calcified mass in the extracalvarial soft tissues of the right occipital bone.  CTA BRAIN: Patent intracranial circulation. No flow-limiting stenosis or occlusion.   CTA NECK: Patent cervical vasculature. No flow limiting stenosis or occlusion.   Cerebral Angiogram (12.20.17) : Preliminary report normal pial and dural surfaces. No aneurysms. No venous occlusions.     MR Head No Cont (12.22.17)   As described on patient's CT midline cerebellar hemorrhage   with mass effect on the fourth ventricle and some mild rostral   hydrocephalus suggested with mild dilatation of the temporal horns and   the lateral ventricles. There is fairly prominent microvascular ischemic   type changes identified given the patient's age. Punctate focus of signal   hyperintensity on diffusion in the left parietal occipital region   consistent with an acute punctate focus of infarct.   CT Head No Cont (12.28.17)  Resolving vermian hemorrhage compared with 12/19/2017. No change in mass   effect on the fourth ventricle or rostral hydrocephalus. THE PATIENT WAS SEEN AND EXAMINED BY ME WITH THE HOUSESTAFF AND STROKE TEAM DURING MORNING ROUNDS.     HPI:  57 year old male pmhx of htn coming in with 1 day of dizziness prior to admission. Patient presented to ED when the dizziness persisted and was found to have cerebellar hemorrhage and 4th ventricle IVH (Vermian Hemorrhage) for which he was transferred to [facility].  Patient stated he takes no medication and has no pmhx but reportedly has hx of htn.Denied generalized weakness, and nausea. No fall or recent trauma. No numbness, tingling, weakness. No antiplatelets or anticoagulants. Patient  denied and headache, n/v, blurry vision, double vision, numbness or weakness.     SUBJECTIVE: No events overnight.  No new neurologic complaints.      acetaminophen   Tablet 650 milliGRAM(s) Oral every 6 hours PRN  acetaminophen   Tablet. 650 milliGRAM(s) Oral every 6 hours PRN  aluminum hydroxide/magnesium hydroxide/simethicone Suspension 30 milliLiter(s) Oral every 6 hours PRN  amLODIPine   Tablet 10 milliGRAM(s) Oral daily  atorvastatin 20 milliGRAM(s) Oral at bedtime  dextrose 50% Injectable 12.5 Gram(s) IV Push once  dextrose 50% Injectable 25 Gram(s) IV Push once  dextrose 50% Injectable 25 Gram(s) IV Push once  dextrose Gel 1 Dose(s) Oral once PRN  docusate sodium 100 milliGRAM(s) Oral daily  enoxaparin Injectable 40 milliGRAM(s) SubCutaneous <User Schedule>  glucagon  Injectable 1 milliGRAM(s) IntraMuscular once PRN  influenza   Vaccine 0.5 milliLiter(s) IntraMuscular once  insulin lispro (HumaLOG) corrective regimen sliding scale   SubCutaneous Before meals and at bedtime  insulin NPH human recombinant 4 Unit(s) SubCutaneous Before meals and at bedtime  lisinopril 40 milliGRAM(s) Oral daily  metoprolol     tartrate 25 milliGRAM(s) Oral two times a day  senna 2 Tablet(s) Oral at bedtime  triamcinolone 0.1% Ointment 1 Application(s) Topical two times a day      PHYSICAL EXAM:   Vital Signs Last 24 Hrs  T(C): 36.4 (01 Jan 2018 07:40), Max: 37 (31 Dec 2017 12:42)  T(F): 97.5 (01 Jan 2018 07:40), Max: 98.6 (31 Dec 2017 12:42)  HR: 66 (01 Jan 2018 07:40) (63 - 72)  BP: 132/81 (01 Jan 2018 07:40) (132/81 - 161/98)  BP(mean): --  RR: 18 (01 Jan 2018 07:40) (18 - 20)  SpO2: 96% (01 Jan 2018 07:40) (94% - 97%)    General: Resting in bed. No acute distress  HEENT: PERRL,  EOM intact, visual fields full    NEUROLOGICAL EXAM:   Mental status: Cantonese speaking, alert, oriented x3. Able to follow simple commands. Speech is fluent but with mild dysarthria   Cranial Nerves: Left facial droop, no nystagmus, no dysarthria, tongue is midline.  Motor exam: Normal tone, no drift. Strength 5/5 throughout.  Sensation: Intact to light touch   Coordination/ Gait: Bilateral ataxia/dysmetria, worse on the left, Ataxic gait with mild truncal ataxia     LABS:     Hemoglobin A1C, Whole Blood: 6.7 % (12-17 @ 06:40)      IMAGING: Reviewed by me.     CT BRAIN (12/19/2017)  History: Follow-up parenchymal hemorrhage. Multiple axial sections were performed from base of skull to vertex without contrast enhancement. This exam is compared with prior noncontrast head CT performed on December 18, 2017. Parenchymal hemorrhage and surrounding edema is again seen in the   posterior fossa midline. This area. Or hemorrhage measures approximately .6 x 3.0 cm and previously measured approximately 3.4 x 3.1 cm. Mass effect on the fourth ventricles again seen and unchanged. The size and configuration of the prominent lateral third ventricles are again seen and unchanged.  Periventricular white matter lucency is again seen and unchanged. Evaluation of the osseous structures with the appropriate window appears unremarkable. The visualized paranasal sinuses mastoid and middle ear regions appear clear    CT Angio Neck w/ IV Cont (12.15.17) CT BRAIN: Acute intra-axial hemorrhage in the cerebellar vermis causing mass effect on the fourth ventricle. Mild prominence of the third and lateral ventricles. Heterogeneous calcified mass in the extracalvarial soft tissues of the right occipital bone.  CTA BRAIN: Patent intracranial circulation. No flow-limiting stenosis or occlusion.   CTA NECK: Patent cervical vasculature. No flow limiting stenosis or occlusion.   Cerebral Angiogram (12.20.17) : Preliminary report normal pial and dural surfaces. No aneurysms. No venous occlusions.     MR Head No Cont (12.22.17)   As described on patient's CT midline cerebellar hemorrhage   with mass effect on the fourth ventricle and some mild rostral   hydrocephalus suggested with mild dilatation of the temporal horns and   the lateral ventricles. There is fairly prominent microvascular ischemic   type changes identified given the patient's age. Punctate focus of signal   hyperintensity on diffusion in the left parietal occipital region   consistent with an acute punctate focus of infarct.   CT Head No Cont (12.28.17)  Resolving vermian hemorrhage compared with 12/19/2017. No change in mass   effect on the fourth ventricle or rostral hydrocephalus.

## 2018-01-01 NOTE — PROGRESS NOTE ADULT - ASSESSMENT
ASSESSMENT:   56 Y/O man with vascular risk factors of age and hypertension reports to have noticed one day of dizziness prior to admission. Patient presented to ED when the dizziness persisted and was found to have cerebellar hemorrhage and 4th ventricle IVH (Vermian Hemorrhage) for which he was transferred to [facility]. MRI brain showed stable paramedian/vermian cerebellar ICH as well as evidence of multiple old microhemorrhages predominantly affecting deep  territories and moderate leukoaraiosis and punctate areas of restriction diffusion involving left frontal and left parieto-occipital region. CTA head on admission as well as Conventional angiogram performed subsequently did not show any evidence of vascular malformation being the etiology of his ICH. Repeat CT brain on 12/28 showed expected evolution of vermian hemorrhage with stable mass effect and effacement of the fourth ventricle without any worsening hydrocephalus as compared to previous brain imaging.    Impression:  Nontraumatic cerebellar ICH. Paramedian/vermian cerebellar intracerebral hemorrhage with associated IVH - likely etiology being microangiopathy related to chronic hypertension leading to hypertensive intracerebral hemorrhage   Small punctate area is of diffusion restriction involving the left frontal and left parietal occipital region - likely etiology being small punctate areas of restriction diffusion associated with intracerebral hemorrhage, as described in the literature   Associated cerebral edema with mass effect and brain compression leading to effacement of the fourth ventricle with stable hydrocephalus    NEURO: Neurologically improved vs admission but remains with significant gait instability, stable cerebral edema with mass effect and brain compression, given vomiting will repeat CTH,  s/p cerebral angiography 12/20: demonstrated normal pial and dural surfaces. No aneurysms. No venous occlusions BP goal - gradual normotension. Atorvastatin 20 mg at bedtime considering likely newly diagnosed diabetes and 10 years ASCVD risk being 10.4%, Plan to obtain repeat neuroimaging in 4-6 weeks to r/o underlying pathology. PMR for AR.     ANTITHROMBOTIC THERAPY: None in the setting of ICH and no specific indications at this time    PULMONARY: Protecting airway, saturating well on room air.     CARDIOVASCULAR: TTE - unremarkable and no evidence of LVH, Continue cardiac monitoring, slowly titrate antihypertensive regimen accordingly.     SBP goal: Gradual normotension     GASTROINTESTINAL: dysphagia screen passed. Tolerating diet      Diet: Regular. Consistent Carb.    RENAL: maintain adequate hydration, good urine output, avoid hyponatremia     Na Goal: Greater than 140     Bray: n        HEMATOLOGY: no active bleeding     DVT ppx: LMWH      ID: afebrile, leukocytosis 12/30, pan-Cx ordered-negative to date, continue encourage incentive spirometry as tolerated. Lower extremity duplex negative for DVT. No si/sx of infection    Other:    aubrie rash noted on thighs and encroaching the right groin and abdomen,  Cetirizine and benadryl, most likely allergic response to contrast.  Appreciate derm consult. Plan was discussed with patient and available family member at bedside in detail through the  phone.     DISPOSITION: Discharge to home with family once optimized from PT/OT standpoint, arrangements have been made for him to go back to [facility] for [facility], spoke with Javon Corona [phone] who is in charge of making these arrangements for family on 12/30 and explained that the patient does not have insurance for additional therapy outside of hospital and that family would have to pay out of pocket.  Awaiting call back with plans of returning to [facility]. PT to continue training family so he may be discharged to family with 24hr supervision and assist due to his gait instability.      CORE MEASURES:        Admission NIHSS: No     TPA: NO      LDL/HDL: NO     Depression Screen: 0     Statin Therapy: NO     Dysphagia Screen: PASS     Smoking  NO      Afib  NO     Stroke Education YES ASSESSMENT:   58 Y/O man with vascular risk factors of age and hypertension reports to have noticed one day of dizziness prior to admission. Patient presented to ED when the dizziness persisted and was found to have cerebellar hemorrhage and 4th ventricle IVH (Vermian Hemorrhage) for which he was transferred to [facility]. MRI brain showed stable paramedian/vermian cerebellar ICH as well as evidence of multiple old microhemorrhages predominantly affecting deep  territories and moderate leukoaraiosis and punctate areas of restriction diffusion involving left frontal and left parieto-occipital region. CTA head on admission as well as Conventional angiogram performed subsequently did not show any evidence of vascular malformation being the etiology of his ICH. Repeat CT brain on 12/28 showed expected evolution of vermian hemorrhage with stable mass effect and effacement of the fourth ventricle without any worsening hydrocephalus as compared to previous brain imaging.    Impression:  Nontraumatic cerebellar ICH. Paramedian/vermian cerebellar intracerebral hemorrhage with associated IVH - likely etiology being microangiopathy related to chronic hypertension leading to hypertensive intracerebral hemorrhage   Small punctate area is of diffusion restriction involving the left frontal and left parietal occipital region - likely etiology being small punctate areas of restriction diffusion associated with intracerebral hemorrhage, as described in the literature   Associated cerebral edema with mass effect and brain compression leading to effacement of the fourth ventricle with stable hydrocephalus    NEURO: Neurologically improved vs admission but remains with significant gait instability, stable cerebral edema with mass effect and brain compression, given vomiting  repeated CTH- no acute change,  s/p cerebral angiography 12/20: demonstrated normal pial and dural surfaces. No aneurysms. No venous occlusions BP goal - gradual normotension. Atorvastatin 20 mg at bedtime considering likely newly diagnosed diabetes and 10 years ASCVD risk being 10.4%, Plan to obtain repeat neuroimaging in 4-6 weeks to r/o underlying pathology. PMR for AR.     ANTITHROMBOTIC THERAPY: None in the setting of ICH and no specific indications at this time    PULMONARY: Protecting airway, saturating well on room air.     CARDIOVASCULAR: TTE - unremarkable and no evidence of LVH, Continue cardiac monitoring, slowly titrate antihypertensive regimen accordingly (changed metoprolol to XL)     SBP goal: Gradual normotension     GASTROINTESTINAL: dysphagia screen passed. Tolerating diet      Diet: Regular. Consistent Carb.    RENAL: maintain adequate hydration, good urine output, avoid hyponatremia     Na Goal: Greater than 140     Bray: n        HEMATOLOGY: no active bleeding     DVT ppx: LMWH      ID: afebrile, leukocytosis 12/30, pan-Cx ordered-negative to date, continue encourage incentive spirometry as tolerated. Lower extremity duplex negative for DVT. No si/sx of infection    Other:    aubrie rash noted on thighs and encroaching the right groin and abdomen,  Cetirizine and benadryl, most likely allergic response to contrast.  Appreciate derm consult. Plan was discussed with patient and available family member at bedside in detail through the  phone. A1C 6.7- education, diet and lifestyle modifications- anticipate d/c on Metformin 500bid as FS range 120s-200s on standing insulin.     DISPOSITION: Discharge to home with family once optimized from PT/OT standpoint, arrangements have been made for him to go back to [facility] for [place], spoke with Jvaon Corona [phone] who is in charge of making these arrangements for family on 12/30 and explained that the patient does not have insurance for additional therapy outside of hospital and that family would have to pay out of pocket.  Awaiting call back with plans of returning to [facility]. PT to continue training family so he may be discharged to family with 24hr supervision and assist due to his gait instability.      CORE MEASURES:        Admission NIHSS: No     TPA: NO      LDL/HDL: NO     Depression Screen: 0     Statin Therapy: NO     Dysphagia Screen: PASS     Smoking  NO      Afib  NO     Stroke Education YES ASSESSMENT:   58 Y/O man with vascular risk factors of age and hypertension reports to have noticed one day of dizziness prior to admission. Patient presented to ED when the dizziness persisted and was found to have cerebellar hemorrhage and 4th ventricle IVH (Vermian Hemorrhage) for which he was transferred to [facility]. MRI brain showed stable paramedian/vermian cerebellar ICH as well as evidence of multiple old microhemorrhages predominantly affecting deep  territories and moderate leukoaraiosis and punctate areas of restriction diffusion involving left frontal and left parieto-occipital region. CTA head on admission as well as Conventional angiogram performed subsequently did not show any evidence of vascular malformation being the etiology of his ICH. Repeat CT brain on 12/28 showed expected evolution of vermian hemorrhage with stable mass effect and effacement of the fourth ventricle without any worsening hydrocephalus as compared to previous brain imaging.    Impression:  Nontraumatic cerebellar ICH. Paramedian/vermian cerebellar intracerebral hemorrhage with associated IVH - likely etiology being microangiopathy related to chronic hypertension leading to hypertensive intracerebral hemorrhage   Small punctate area is of diffusion restriction involving the left frontal and left parietal occipital region - likely etiology being small punctate areas of restriction diffusion associated with intracerebral hemorrhage, as described in the literature   Associated cerebral edema with mass effect and brain compression leading to effacement of the fourth ventricle with stable hydrocephalus    NEURO: Neurologically improved vs admission but remains with significant gait instability, stable cerebral edema with mass effect and brain compression, given vomiting  repeated CTH- no acute change,  s/p cerebral angiography 12/20: demonstrated normal pial and dural surfaces. No aneurysms. No venous occlusions BP goal - gradual normotension. Atorvastatin 20 mg at bedtime considering likely newly diagnosed diabetes and 10 years ASCVD risk being 10.4%, Plan to obtain repeat neuroimaging in 4-6 weeks to r/o underlying pathology. PMR for AR.     ANTITHROMBOTIC THERAPY: None in the setting of ICH and no specific indications at this time    PULMONARY: Protecting airway, saturating well on room air.     CARDIOVASCULAR: TTE - unremarkable and no evidence of LVH, Continue cardiac monitoring, slowly titrate antihypertensive regimen accordingly (changed metoprolol to XL)     SBP goal: Gradual normotension     GASTROINTESTINAL: dysphagia screen passed. Tolerating diet      Diet: Regular. Consistent Carb.    RENAL: maintain adequate hydration, good urine output, avoid hyponatremia     Na Goal: Greater than 140     Bray: n        HEMATOLOGY: no active bleeding     DVT ppx: LMWH      ID: afebrile, leukocytosis 12/30, pan-Cx ordered-negative to date, continue encourage incentive spirometry as tolerated. Lower extremity duplex negative for DVT. No si/sx of infection    Other: aubrie rash noted on thighs and encroaching the right groin and abdomen, Cetirizine and benadryl, most likely allergic response to contrast. Appreciate derm consult. Plan was discussed with patient and available family member at bedside in detail through the  phone. A1C 6.7- education, diet and lifestyle modifications- anticipate d/c on Metformin 500bid as FS range 120s-200s on standing insulin.     DISPOSITION: Discharge to home with family once optimized from PT/OT standpoint, arrangements have been made for him to go back to [facility] for [place], spoke with Javon Corona [phone] who is in charge of making these arrangements for family on 12/30 and explained that the patient does not have insurance for additional therapy outside of hospital and that family would have to pay out of pocket. Awaiting call back with plans of returning to [facility]. PT to continue training family so he may be discharged to family with 24hr supervision and assist due to his gait instability.      CORE MEASURES:        Admission NIHSS: No     TPA: NO      LDL/HDL: NO     Depression Screen: 0     Statin Therapy: NO     Dysphagia Screen: PASS     Smoking  NO      Afib  NO     Stroke Education YES

## 2018-01-02 LAB
GLUCOSE BLDC GLUCOMTR-MCNC: 133 MG/DL — HIGH (ref 70–99)
GLUCOSE BLDC GLUCOMTR-MCNC: 157 MG/DL — HIGH (ref 70–99)
GLUCOSE BLDC GLUCOMTR-MCNC: 171 MG/DL — HIGH (ref 70–99)
GLUCOSE BLDC GLUCOMTR-MCNC: 205 MG/DL — HIGH (ref 70–99)

## 2018-01-02 PROCEDURE — 99233 SBSQ HOSP IP/OBS HIGH 50: CPT

## 2018-01-02 RX ADMIN — SENNA PLUS 2 TABLET(S): 8.6 TABLET ORAL at 22:08

## 2018-01-02 RX ADMIN — HUMAN INSULIN 4 UNIT(S): 100 INJECTION, SUSPENSION SUBCUTANEOUS at 17:57

## 2018-01-02 RX ADMIN — Medication 30 MILLILITER(S): at 23:00

## 2018-01-02 RX ADMIN — Medication 4: at 17:57

## 2018-01-02 RX ADMIN — Medication 2: at 12:44

## 2018-01-02 RX ADMIN — AMLODIPINE BESYLATE 10 MILLIGRAM(S): 2.5 TABLET ORAL at 22:08

## 2018-01-02 RX ADMIN — Medication 100 MILLIGRAM(S): at 12:44

## 2018-01-02 RX ADMIN — Medication 1 APPLICATION(S): at 17:57

## 2018-01-02 RX ADMIN — ATORVASTATIN CALCIUM 20 MILLIGRAM(S): 80 TABLET, FILM COATED ORAL at 22:08

## 2018-01-02 RX ADMIN — LISINOPRIL 40 MILLIGRAM(S): 2.5 TABLET ORAL at 12:44

## 2018-01-02 RX ADMIN — ENOXAPARIN SODIUM 40 MILLIGRAM(S): 100 INJECTION SUBCUTANEOUS at 17:57

## 2018-01-02 RX ADMIN — Medication 650 MILLIGRAM(S): at 01:00

## 2018-01-02 RX ADMIN — Medication 1 APPLICATION(S): at 06:06

## 2018-01-02 RX ADMIN — HUMAN INSULIN 4 UNIT(S): 100 INJECTION, SUSPENSION SUBCUTANEOUS at 22:15

## 2018-01-02 RX ADMIN — Medication 25 MILLIGRAM(S): at 06:06

## 2018-01-02 RX ADMIN — HUMAN INSULIN 4 UNIT(S): 100 INJECTION, SUSPENSION SUBCUTANEOUS at 12:44

## 2018-01-02 RX ADMIN — Medication 2: at 22:15

## 2018-01-02 RX ADMIN — HUMAN INSULIN 4 UNIT(S): 100 INJECTION, SUSPENSION SUBCUTANEOUS at 08:46

## 2018-01-02 NOTE — PROGRESS NOTE ADULT - ASSESSMENT
ASSESSMENT:   56 Y/O man with vascular risk factors of age and hypertension reports to have noticed one day of dizziness prior to admission. Patient presented to ED when the dizziness persisted and was found to have cerebellar hemorrhage and 4th ventricle IVH (Vermian Hemorrhage) for which he was transferred to [facility]. Impression:Nontraumatic cerebellar ICH. Paramedian/vermian cerebellar intracerebral hemorrhage with associated IVH - likely etiology being microangiopathy related to chronic hypertension leading to hypertensive intracerebral hemorrhage Small punctate area is of diffusion restriction involving the left frontal and left parietal occipital region - likely etiology being small punctate areas of restriction diffusion associated with intracerebral hemorrhage, as described in the literature       NEURO: Neurologically improved vs admission but remains with significant gait instability, stable cerebral edema with mass effect and brain compression leading to effacement of the fourth ventricle with stable hydrocephalus  s/p cerebral angiography 12/20: demonstrated normal pial and dural surfaces. No aneurysms. No venous occlusions BP goal - gradual normotension. Atorvastatin 20 mg at bedtime considering likely newly diagnosed diabetes and 10 years ASCVD risk being 10.4%, Plan to obtain repeat neuroimaging in 4-6 weeks to r/o underlying pathology. PMR for AR.     ANTITHROMBOTIC THERAPY: None in the setting of ICH and no specific indications at this time    PULMONARY: Protecting airway, saturating well on room air.     CARDIOVASCULAR: TTE - unremarkable and no evidence of LVH, Continue cardiac monitoring, slowly titrate antihypertensive regimen accordingly       SBP goal: Gradual normotension     GASTROINTESTINAL: dysphagia screen passed. Tolerating diet      Diet: Regular. Consistent Carb.    RENAL: maintain adequate hydration, good urine output, avoid hyponatremia     Na Goal: Greater than 140     Bray: n        HEMATOLOGY: no active bleeding     DVT ppx: LMWH      ID: afebrile, leukocytosis 12/30, pan-Cx ordered-negative to date, continue encourage incentive spirometry as tolerated. Lower extremity duplex negative for DVT. No si/sx of infection    Other: Appreciate derm consult. A1C 6.7- education, diet and lifestyle modifications- anticipate d/c on Metformin 500bid as FS range 120s-200s on  insulin.     DISPOSITION: Social work following with anticipated plan per family for return to [facility] for therapy once wheelchair in place, on order at this time. Recommend frequent ambulation as tolerated during flight with full assist.       CORE MEASURES:        Admission NIHSS: No     TPA: NO      LDL/HDL: NO     Depression Screen: 0     Statin Therapy: NO     Dysphagia Screen: PASS     Smoking  NO      Afib  NO     Stroke Education YES ASSESSMENT:   58 Y/O man with vascular risk factors of age and hypertension reports to have noticed one day of dizziness prior to admission. Patient presented to ED when the dizziness persisted and was found to have cerebellar hemorrhage and 4th ventricle IVH (Vermian Hemorrhage) for which he was transferred to [facility]. Impression:Nontraumatic cerebellar ICH. Paramedian/vermian cerebellar intracerebral hemorrhage with associated IVH - likely etiology being microangiopathy related to chronic hypertension leading to hypertensive intracerebral hemorrhage. Small punctate area is of diffusion restriction involving the left frontal and left parietal occipital region - likely etiology being small punctate areas of restriction diffusion associated with intracerebral hemorrhage, as described in the literature     NEURO: Neurologically improved vs admission but remains with significant gait instability, stable cerebral edema with mass effect and brain compression leading to effacement of the fourth ventricle with stable hydrocephalus  s/p cerebral angiography 12/20: demonstrated normal pial and dural surfaces. No aneurysms. No venous occlusions BP goal - gradual normotension. Atorvastatin 20 mg at bedtime considering likely newly diagnosed diabetes and 10 years ASCVD risk being 10.4%, Plan to obtain repeat neuroimaging in 4-6 weeks to r/o underlying pathology. PMR for AR.     ANTITHROMBOTIC THERAPY: None in the setting of ICH and no specific indications at this time    PULMONARY: Protecting airway, saturating well on room air.     CARDIOVASCULAR: TTE - unremarkable and no evidence of LVH, Continue cardiac monitoring, slowly titrate antihypertensive regimen accordingly       SBP goal: Gradual normotension     GASTROINTESTINAL: dysphagia screen passed. Tolerating diet      Diet: Regular. Consistent Carb.    RENAL: maintain adequate hydration, good urine output, avoid hyponatremia     Na Goal: Greater than 140     Bray: n        HEMATOLOGY: no active bleeding     DVT ppx: LMWH      ID: afebrile, leukocytosis 12/30, pan-Cx ordered-negative to date, continue encourage incentive spirometry as tolerated. Lower extremity duplex negative for DVT. No si/sx of infection    Other: Appreciate derm consult. A1C 6.7- education, diet and lifestyle modifications- anticipate d/c on Metformin 500bid as FS range 120s-200s on  insulin.     DISPOSITION: Social work following with anticipated plan per family for return to [facility] for therapy once wheelchair in place, on order at this time. Stable for discharge.      CORE MEASURES:        Admission NIHSS: No     TPA: NO      LDL/HDL: NO     Depression Screen: 0     Statin Therapy: NO     Dysphagia Screen: PASS     Smoking  NO      Afib  NO     Stroke Education YES

## 2018-01-03 LAB
GLUCOSE BLDC GLUCOMTR-MCNC: 115 MG/DL — HIGH (ref 70–99)
GLUCOSE BLDC GLUCOMTR-MCNC: 122 MG/DL — HIGH (ref 70–99)
GLUCOSE BLDC GLUCOMTR-MCNC: 160 MG/DL — HIGH (ref 70–99)
GLUCOSE BLDC GLUCOMTR-MCNC: 204 MG/DL — HIGH (ref 70–99)

## 2018-01-03 PROCEDURE — 99233 SBSQ HOSP IP/OBS HIGH 50: CPT

## 2018-01-03 RX ORDER — ONDANSETRON 8 MG/1
4 TABLET, FILM COATED ORAL EVERY 6 HOURS
Qty: 0 | Refills: 0 | Status: DISCONTINUED | OUTPATIENT
Start: 2018-01-03 | End: 2018-01-07

## 2018-01-03 RX ADMIN — Medication 1 APPLICATION(S): at 17:43

## 2018-01-03 RX ADMIN — HUMAN INSULIN 4 UNIT(S): 100 INJECTION, SUSPENSION SUBCUTANEOUS at 21:49

## 2018-01-03 RX ADMIN — Medication 30 MILLILITER(S): at 21:49

## 2018-01-03 RX ADMIN — HUMAN INSULIN 4 UNIT(S): 100 INJECTION, SUSPENSION SUBCUTANEOUS at 17:43

## 2018-01-03 RX ADMIN — AMLODIPINE BESYLATE 10 MILLIGRAM(S): 2.5 TABLET ORAL at 21:31

## 2018-01-03 RX ADMIN — Medication 2: at 17:43

## 2018-01-03 RX ADMIN — Medication 650 MILLIGRAM(S): at 08:05

## 2018-01-03 RX ADMIN — HUMAN INSULIN 4 UNIT(S): 100 INJECTION, SUSPENSION SUBCUTANEOUS at 12:44

## 2018-01-03 RX ADMIN — ENOXAPARIN SODIUM 40 MILLIGRAM(S): 100 INJECTION SUBCUTANEOUS at 17:44

## 2018-01-03 RX ADMIN — ATORVASTATIN CALCIUM 20 MILLIGRAM(S): 80 TABLET, FILM COATED ORAL at 21:31

## 2018-01-03 RX ADMIN — Medication 1 APPLICATION(S): at 05:43

## 2018-01-03 RX ADMIN — Medication 650 MILLIGRAM(S): at 07:35

## 2018-01-03 RX ADMIN — LISINOPRIL 40 MILLIGRAM(S): 2.5 TABLET ORAL at 12:44

## 2018-01-03 RX ADMIN — Medication 100 MILLIGRAM(S): at 12:44

## 2018-01-03 RX ADMIN — SENNA PLUS 2 TABLET(S): 8.6 TABLET ORAL at 21:31

## 2018-01-03 RX ADMIN — Medication 25 MILLIGRAM(S): at 05:43

## 2018-01-03 RX ADMIN — Medication 4: at 21:49

## 2018-01-03 RX ADMIN — HUMAN INSULIN 4 UNIT(S): 100 INJECTION, SUSPENSION SUBCUTANEOUS at 08:42

## 2018-01-03 NOTE — PROGRESS NOTE ADULT - ASSESSMENT
ASSESSMENT:   58 Y/O man with vascular risk factors of age and hypertension reports to have noticed one day of dizziness prior to admission. Patient presented to ED when the dizziness persisted and was found to have cerebellar hemorrhage and 4th ventricle IVH (Vermian Hemorrhage) for which he was transferred to [facility]. Impression:Nontraumatic cerebellar ICH. Paramedian/vermian cerebellar intracerebral hemorrhage with associated IVH - likely etiology being microangiopathy related to chronic hypertension leading to hypertensive intracerebral hemorrhage. Small punctate area is of diffusion restriction involving the left frontal and left parietal occipital region - likely etiology being small punctate areas of restriction diffusion associated with intracerebral hemorrhage, as described in the literature     NEURO: Neurologically improved vs admission but remains with significant gait instability, stable cerebral edema with mass effect and brain compression leading to effacement of the fourth ventricle with stable hydrocephalus  s/p cerebral angiography 12/20: demonstrated normal pial and dural surfaces. No aneurysms. No venous occlusions BP goal - gradual normotension. Atorvastatin 20 mg at bedtime considering likely newly diagnosed diabetes and 10 years ASCVD risk being 10.4%, Plan to obtain repeat neuroimaging in 4-6 weeks to r/o underlying pathology. PMR for AR.     ANTITHROMBOTIC THERAPY: None in the setting of ICH and no specific indications at this time    PULMONARY: Protecting airway, saturating well on room air.     CARDIOVASCULAR: TTE - unremarkable and no evidence of LVH, Continue cardiac monitoring, slowly titrate antihypertensive regimen accordingly       SBP goal: Gradual normotension     GASTROINTESTINAL: dysphagia screen passed. Tolerating diet      Diet: Regular. Consistent Carb.    RENAL: maintain adequate hydration, good urine output, avoid hyponatremia     Na Goal: Greater than 140     Bray: n        HEMATOLOGY: no active bleeding     DVT ppx: LMWH      ID: afebrile, leukocytosis 12/30, pan-Cx ordered-negative to date, continue encourage incentive spirometry as tolerated. Lower extremity duplex negative for DVT. No si/sx of infection    Other: Appreciate derm consult. A1C 6.7- education, diet and lifestyle modifications- anticipate d/c on Metformin 500bid as FS range 120s-200s on  insulin.     DISPOSITION: Social work following with anticipated plan per family for return to [facility] for therapy once wheelchair in place, on order at this time. Stable for discharge.      CORE MEASURES:        Admission NIHSS: No     TPA: NO      LDL/HDL: NO     Depression Screen: 0     Statin Therapy: NO     Dysphagia Screen: PASS     Smoking  NO      Afib  NO     Stroke Education YES ASSESSMENT:   58 Y/O man with vascular risk factors of age and hypertension reports to have noticed one day of dizziness prior to admission. Patient presented to ED when the dizziness persisted and was found to have cerebellar hemorrhage and 4th ventricle IVH (Vermian Hemorrhage) for which he was transferred to [facility]. Impression:Nontraumatic cerebellar ICH. Paramedian/vermian cerebellar intracerebral hemorrhage with associated IVH - likely etiology being microangiopathy related to chronic hypertension leading to hypertensive intracerebral hemorrhage. Small punctate area is of diffusion restriction involving the left frontal and left parietal occipital region - likely etiology being small punctate areas of restriction diffusion associated with intracerebral hemorrhage, as described in the literature     NEURO: Neurologically improved vs admission but remains with significant gait instability, stable cerebral edema with mass effect and brain compression leading to effacement of the fourth ventricle with stable hydrocephalus  s/p cerebral angiography 12/20: demonstrated normal pial and dural surfaces. No aneurysms. No venous occlusions BP goal - gradual normotension. Atorvastatin 20 mg at bedtime considering likely newly diagnosed diabetes and 10 years ASCVD risk being 10.4%, Plan to obtain repeat neuroimaging in 4-6 weeks to r/o underlying pathology. PMR for AR.     ANTITHROMBOTIC THERAPY: None in the setting of ICH and no specific indications at this time    PULMONARY: Protecting airway, saturating well on room air.     CARDIOVASCULAR: TTE - unremarkable and no evidence of LVH, Continue cardiac monitoring, slowly titrate antihypertensive regimen accordingly       SBP goal: Gradual normotension     GASTROINTESTINAL: dysphagia screen passed. Tolerating diet      Diet: Regular. Consistent Carb.    RENAL: maintain adequate hydration, good urine output, avoid hyponatremia     Na Goal: Greater than 140     Bray: n        HEMATOLOGY: no active bleeding     DVT ppx: LMWH      ID: afebrile, leukocytosis 12/30, pan-Cx ordered-negative to date, continue encourage incentive spirometry as tolerated. Lower extremity duplex negative for DVT. No si/sx of infection    Other: Appreciate derm consult. A1C 6.7- education, diet and lifestyle modifications- anticipate d/c on Metformin 500bid as FS range 120s-200s on  insulin.     DISPOSITION: Social work following with anticipated plan per family for return to [facility] for therapy once wheelchair in place, on order at this time.  Flight booked for 1/8/18 per family. Stable for discharge.      CORE MEASURES:        Admission NIHSS: No     TPA: NO      LDL/HDL: NO     Depression Screen: 0     Statin Therapy: NO     Dysphagia Screen: PASS     Smoking  NO      Afib  NO     Stroke Education YES ASSESSMENT:   58 Y/O man with vascular risk factors of age and hypertension reports to have noticed one day of dizziness prior to admission. Patient presented to ED when the dizziness persisted and was found to have cerebellar hemorrhage and 4th ventricle IVH (Vermian Hemorrhage) for which he was transferred to [facility]. Impression: Nontraumatic cerebellar ICH. Paramedian/vermian cerebellar intracerebral hemorrhage with associated IVH - likely etiology being microangiopathy related to chronic hypertension leading to hypertensive intracerebral hemorrhage. Small punctate area is of diffusion restriction involving the left frontal and left parietal occipital region - likely etiology being small punctate areas of restriction diffusion associated with intracerebral hemorrhage, as described in the literature     NEURO: Neurologically improved vs admission but remains with significant gait instability, stable cerebral edema with mass effect and brain compression leading to effacement of the fourth ventricle with stable hydrocephalus  s/p cerebral angiography 12/20: demonstrated normal pial and dural surfaces. No aneurysms. No venous occlusions BP goal - gradual normotension. Atorvastatin 20 mg at bedtime considering likely newly diagnosed diabetes and 10 years ASCVD risk being 10.4%, Plan to obtain repeat neuroimaging in 4-6 weeks to r/o underlying pathology. PMR for AR.     ANTITHROMBOTIC THERAPY: None in the setting of ICH and no specific indications at this time    PULMONARY: Protecting airway, saturating well on room air.     CARDIOVASCULAR: TTE - unremarkable and no evidence of LVH, Continue cardiac monitoring, slowly titrate antihypertensive regimen accordingly       SBP goal: Gradual normotension     GASTROINTESTINAL: dysphagia screen passed. Tolerating diet      Diet: Regular. Consistent Carb.    RENAL: maintain adequate hydration, good urine output, avoid hyponatremia     Na Goal: Greater than 140     Bray: n        HEMATOLOGY: no active bleeding     DVT ppx: LMWH      ID: afebrile, leukocytosis 12/30, pan-Cx ordered-negative to date, continue encourage incentive spirometry as tolerated. Lower extremity duplex negative for DVT. No si/sx of infection    Other: Appreciate derm consult. A1C 6.7- education, diet and lifestyle modifications- anticipate d/c on Metformin 500bid as FS range 120s-200s on  insulin.     DISPOSITION: Social work following with anticipated plan per family for return to [facility] for therapy once wheelchair in place, on order at this time.  Flight booked for 1/8/18 per family. Stable for discharge.      CORE MEASURES:        Admission NIHSS: No     TPA: NO      LDL/HDL: NO     Depression Screen: 0     Statin Therapy: NO     Dysphagia Screen: PASS     Smoking  NO      Afib  NO     Stroke Education YES

## 2018-01-03 NOTE — PROGRESS NOTE ADULT - SUBJECTIVE AND OBJECTIVE BOX
THE PATIENT WAS SEEN AND EXAMINED BY ME WITH THE HOUSESTAFF AND STROKE TEAM DURING MORNING ROUNDS.   HPI:  57 year old male pmhx of htn coming in with 1 day of dizziness prior to admission. Patient presented to ED when the dizziness persisted and was found to have cerebellar hemorrhage and 4th ventricle IVH (Vermian Hemorrhage) for which he was transferred to [facility].  Patient stated he takes no medication and has no pmhx but reportedly has hx of htn.Denied generalized weakness, and nausea. No fall or recent trauma. No numbness, tingling, weakness. No antiplatelets or anticoagulants. Patient  denied and headache, n/v, blurry vision, double vision, numbness or weakness.       SUBJECTIVE: No events overnight.  No new neurologic complaints.      acetaminophen   Tablet 650 milliGRAM(s) Oral every 6 hours PRN  acetaminophen   Tablet. 650 milliGRAM(s) Oral every 6 hours PRN  aluminum hydroxide/magnesium hydroxide/simethicone Suspension 30 milliLiter(s) Oral every 6 hours PRN  amLODIPine   Tablet 10 milliGRAM(s) Oral daily  atorvastatin 20 milliGRAM(s) Oral at bedtime  dextrose 50% Injectable 12.5 Gram(s) IV Push once  dextrose 50% Injectable 25 Gram(s) IV Push once  dextrose 50% Injectable 25 Gram(s) IV Push once  dextrose Gel 1 Dose(s) Oral once PRN  docusate sodium 100 milliGRAM(s) Oral daily  enoxaparin Injectable 40 milliGRAM(s) SubCutaneous <User Schedule>  glucagon  Injectable 1 milliGRAM(s) IntraMuscular once PRN  influenza   Vaccine 0.5 milliLiter(s) IntraMuscular once  insulin lispro (HumaLOG) corrective regimen sliding scale   SubCutaneous Before meals and at bedtime  insulin NPH human recombinant 4 Unit(s) SubCutaneous Before meals and at bedtime  lisinopril 40 milliGRAM(s) Oral daily  metoprolol succinate ER 25 milliGRAM(s) Oral daily  senna 2 Tablet(s) Oral at bedtime  triamcinolone 0.1% Ointment 1 Application(s) Topical two times a day      PHYSICAL EXAM:   Vital Signs Last 24 Hrs  T(C): 36.6 (03 Jan 2018 07:40), Max: 36.9 (02 Jan 2018 23:32)  T(F): 97.8 (03 Jan 2018 07:40), Max: 98.4 (02 Jan 2018 23:32)  HR: 67 (03 Jan 2018 07:40) (65 - 85)  BP: 149/92 (03 Jan 2018 07:40) (114/77 - 162/92)  BP(mean): --  RR: 18 (03 Jan 2018 07:40) (18 - 18)  SpO2: 97% (03 Jan 2018 07:40) (96% - 98%)    General: Resting in bed. No acute distress  HEENT: PERRL,  EOM intact, visual fields full    NEUROLOGICAL EXAM:   Mental status: Cantonese speaking, alert, oriented x3. Able to follow simple commands. Speech is fluent but with mild dysarthria   Cranial Nerves: Left facial droop, no nystagmus, no dysarthria, tongue is midline.  Motor exam: Normal tone, no drift. Strength 5/5 throughout.  Sensation: Intact to light touch   Coordination/ Gait: Bilateral ataxia/dysmetria, worse on the left, Ataxic gait with mild truncal ataxia     LABS:         Hemoglobin A1C, Whole Blood: 6.7 % (12-17 @ 06:40)      IMAGING: Reviewed by me.     CT BRAIN (12/19/2017)  History: Follow-up parenchymal hemorrhage. Multiple axial sections were performed from base of skull to vertex without contrast enhancement. This exam is compared with prior noncontrast head CT performed on December 18, 2017. Parenchymal hemorrhage and surrounding edema is again seen in the   posterior fossa midline. This area. Or hemorrhage measures approximately .6 x 3.0 cm and previously measured approximately 3.4 x 3.1 cm. Mass effect on the fourth ventricles again seen and unchanged. The size and configuration of the prominent lateral third ventricles are again seen and unchanged.  Periventricular white matter lucency is again seen and unchanged. Evaluation of the osseous structures with the appropriate window appears unremarkable. The visualized paranasal sinuses mastoid and middle ear regions appear clear    CT Angio Neck w/ IV Cont (12.15.17) CT BRAIN: Acute intra-axial hemorrhage in the cerebellar vermis causing mass effect on the fourth ventricle. Mild prominence of the third and lateral ventricles. Heterogeneous calcified mass in the extracalvarial soft tissues of the right occipital bone.  CTA BRAIN: Patent intracranial circulation. No flow-limiting stenosis or occlusion.   CTA NECK: Patent cervical vasculature. No flow limiting stenosis or occlusion.   Cerebral Angiogram (12.20.17) : Preliminary report normal pial and dural surfaces. No aneurysms. No venous occlusions.     MR Head No Cont (12.22.17)   As described on patient's CT midline cerebellar hemorrhage   with mass effect on the fourth ventricle and some mild rostral   hydrocephalus suggested with mild dilatation of the temporal horns and   the lateral ventricles. There is fairly prominent microvascular ischemic   type changes identified given the patient's age. Punctate focus of signal   hyperintensity on diffusion in the left parietal occipital region   consistent with an acute punctate focus of infarct.   CT Head No Cont (12.28.17)  Resolving vermian hemorrhage compared with 12/19/2017. No change in mass   effect on the fourth ventricle or rostral hydrocephalus.

## 2018-01-04 LAB
CULTURE RESULTS: SIGNIFICANT CHANGE UP
GLUCOSE BLDC GLUCOMTR-MCNC: 135 MG/DL — HIGH (ref 70–99)
GLUCOSE BLDC GLUCOMTR-MCNC: 148 MG/DL — HIGH (ref 70–99)
GLUCOSE BLDC GLUCOMTR-MCNC: 172 MG/DL — HIGH (ref 70–99)
GLUCOSE BLDC GLUCOMTR-MCNC: 192 MG/DL — HIGH (ref 70–99)
SPECIMEN SOURCE: SIGNIFICANT CHANGE UP

## 2018-01-04 PROCEDURE — 99233 SBSQ HOSP IP/OBS HIGH 50: CPT

## 2018-01-04 RX ORDER — SENNA PLUS 8.6 MG/1
2 TABLET ORAL
Qty: 60 | Refills: 0 | OUTPATIENT
Start: 2018-01-04 | End: 2018-02-02

## 2018-01-04 RX ORDER — DOCUSATE SODIUM 100 MG
1 CAPSULE ORAL
Qty: 30 | Refills: 0 | OUTPATIENT
Start: 2018-01-04 | End: 2018-02-02

## 2018-01-04 RX ORDER — LISINOPRIL 2.5 MG/1
1 TABLET ORAL
Qty: 30 | Refills: 0 | OUTPATIENT
Start: 2018-01-04 | End: 2018-02-02

## 2018-01-04 RX ORDER — AMLODIPINE BESYLATE 2.5 MG/1
1 TABLET ORAL
Qty: 30 | Refills: 0 | OUTPATIENT
Start: 2018-01-04 | End: 2018-02-02

## 2018-01-04 RX ORDER — FAMOTIDINE 10 MG/ML
20 INJECTION INTRAVENOUS DAILY
Qty: 0 | Refills: 0 | Status: DISCONTINUED | OUTPATIENT
Start: 2018-01-04 | End: 2018-01-07

## 2018-01-04 RX ORDER — METFORMIN HYDROCHLORIDE 850 MG/1
1 TABLET ORAL
Qty: 60 | Refills: 0 | OUTPATIENT
Start: 2018-01-04 | End: 2018-02-02

## 2018-01-04 RX ORDER — METOPROLOL TARTRATE 50 MG
0.5 TABLET ORAL
Qty: 30 | Refills: 0 | OUTPATIENT
Start: 2018-01-04 | End: 2018-02-02

## 2018-01-04 RX ORDER — DIPHENHYDRAMINE HCL 50 MG
1 CAPSULE ORAL
Qty: 10 | Refills: 0 | OUTPATIENT
Start: 2018-01-04 | End: 2018-01-08

## 2018-01-04 RX ORDER — ATORVASTATIN CALCIUM 80 MG/1
1 TABLET, FILM COATED ORAL
Qty: 30 | Refills: 0 | OUTPATIENT
Start: 2018-01-04 | End: 2018-02-02

## 2018-01-04 RX ADMIN — AMLODIPINE BESYLATE 10 MILLIGRAM(S): 2.5 TABLET ORAL at 21:39

## 2018-01-04 RX ADMIN — HUMAN INSULIN 4 UNIT(S): 100 INJECTION, SUSPENSION SUBCUTANEOUS at 09:03

## 2018-01-04 RX ADMIN — Medication 1 APPLICATION(S): at 17:59

## 2018-01-04 RX ADMIN — Medication 2: at 12:35

## 2018-01-04 RX ADMIN — HUMAN INSULIN 4 UNIT(S): 100 INJECTION, SUSPENSION SUBCUTANEOUS at 21:39

## 2018-01-04 RX ADMIN — Medication 100 MILLIGRAM(S): at 12:35

## 2018-01-04 RX ADMIN — Medication 2: at 17:59

## 2018-01-04 RX ADMIN — FAMOTIDINE 20 MILLIGRAM(S): 10 INJECTION INTRAVENOUS at 12:35

## 2018-01-04 RX ADMIN — SENNA PLUS 2 TABLET(S): 8.6 TABLET ORAL at 21:36

## 2018-01-04 RX ADMIN — Medication 30 MILLILITER(S): at 21:36

## 2018-01-04 RX ADMIN — HUMAN INSULIN 4 UNIT(S): 100 INJECTION, SUSPENSION SUBCUTANEOUS at 12:35

## 2018-01-04 RX ADMIN — ENOXAPARIN SODIUM 40 MILLIGRAM(S): 100 INJECTION SUBCUTANEOUS at 17:58

## 2018-01-04 RX ADMIN — ATORVASTATIN CALCIUM 20 MILLIGRAM(S): 80 TABLET, FILM COATED ORAL at 21:36

## 2018-01-04 RX ADMIN — Medication 25 MILLIGRAM(S): at 05:19

## 2018-01-04 RX ADMIN — ONDANSETRON 4 MILLIGRAM(S): 8 TABLET, FILM COATED ORAL at 09:06

## 2018-01-04 RX ADMIN — LISINOPRIL 40 MILLIGRAM(S): 2.5 TABLET ORAL at 12:35

## 2018-01-04 RX ADMIN — HUMAN INSULIN 4 UNIT(S): 100 INJECTION, SUSPENSION SUBCUTANEOUS at 17:59

## 2018-01-04 NOTE — PROGRESS NOTE ADULT - ASSESSMENT
ASSESSMENT:   58 Y/O man with vascular risk factors of age and hypertension reports to have noticed one day of dizziness prior to admission. Patient presented to ED when the dizziness persisted and was found to have cerebellar hemorrhage and 4th ventricle IVH (Vermian Hemorrhage) for which he was transferred to [facility]. Impression: Nontraumatic cerebellar ICH. Paramedian/vermian cerebellar intracerebral hemorrhage with associated IVH - likely etiology being microangiopathy related to chronic hypertension leading to hypertensive intracerebral hemorrhage. Small punctate area is of diffusion restriction involving the left frontal and left parietal occipital region - likely etiology being small punctate areas of restriction diffusion associated with intracerebral hemorrhage, as described in the literature     NEURO: Neurologically improved vs admission but remains with significant gait instability, stable cerebral edema with mass effect and brain compression leading to effacement of the fourth ventricle with stable hydrocephalus  s/p cerebral angiography 12/20: demonstrated normal pial and dural surfaces. No aneurysms. No venous occlusions BP goal - gradual normotension. Atorvastatin 20 mg at bedtime considering likely newly diagnosed diabetes and 10 years ASCVD risk being 10.4%, Plan to obtain repeat neuroimaging in 4-6 weeks to r/o underlying pathology. PMR for AR.     ANTITHROMBOTIC THERAPY: None in the setting of ICH and no specific indications at this time    PULMONARY: Protecting airway, saturating well on room air.     CARDIOVASCULAR: TTE - unremarkable and no evidence of LVH, Continue cardiac monitoring, slowly titrate antihypertensive regimen accordingly       SBP goal: Gradual normotension     GASTROINTESTINAL: dysphagia screen passed. Tolerating diet      Diet: Regular. Consistent Carb.    RENAL: maintain adequate hydration, good urine output, avoid hyponatremia     Na Goal: Greater than 140     Bray: n        HEMATOLOGY: no active bleeding     DVT ppx: LMWH      ID: afebrile, leukocytosis 12/30, pan-Cx ordered-negative to date, continue encourage incentive spirometry as tolerated. Lower extremity duplex negative for DVT. No si/sx of infection    Other: Appreciate derm consult. A1C 6.7- education, diet and lifestyle modifications- anticipate d/c on Metformin 500bid as FS range 120s-200s on  insulin.     DISPOSITION: Social work following with anticipated plan per family for return to [facility] for therapy once wheelchair in place, on order at this time.  Flight booked for 1/8/18 per family. Stable for discharge.      CORE MEASURES:        Admission NIHSS: No     TPA: NO      LDL/HDL: NO     Depression Screen: 0     Statin Therapy: NO     Dysphagia Screen: PASS     Smoking  NO      Afib  NO     Stroke Education YES ASSESSMENT:   56 Y/O man with vascular risk factors of age and hypertension reports to have noticed one day of dizziness prior to admission. Patient presented to ED when the dizziness persisted and was found to have cerebellar hemorrhage and 4th ventricle IVH (Vermian Hemorrhage) for which he was transferred to [facility]. Impression: Nontraumatic cerebellar ICH. Paramedian/vermian cerebellar intracerebral hemorrhage with associated IVH - likely etiology being microangiopathy related to chronic hypertension leading to hypertensive intracerebral hemorrhage. Small punctate area is of diffusion restriction involving the left frontal and left parietal occipital region - likely etiology being small punctate areas of restriction diffusion associated with intracerebral hemorrhage, as described in the literature     NEURO: Neurologically improved vs admission but remains with significant gait instability, stable cerebral edema with mass effect and brain compression leading to effacement of the fourth ventricle with stable hydrocephalus  s/p cerebral angiography 12/20: demonstrated normal pial and dural surfaces. No aneurysms. No venous occlusions BP goal - gradual normotension. Atorvastatin 20 mg at bedtime considering likely newly diagnosed diabetes and 10 years ASCVD risk being 10.4%, Plan to obtain repeat neuroimaging in 4-6 weeks to r/o underlying pathology. PMR for AR.     ANTITHROMBOTIC THERAPY: None in the setting of ICH and no specific indications at this time    PULMONARY: Protecting airway, saturating well on room air.     CARDIOVASCULAR: TTE - unremarkable and no evidence of LVH, Continue cardiac monitoring, slowly titrate antihypertensive regimen accordingly       SBP goal: Gradual normotension     GASTROINTESTINAL: dysphagia screen passed. Tolerating diet      Diet: Regular. Consistent Carb.    RENAL: maintain adequate hydration, good urine output, avoid hyponatremia; c/o night incontinence, explained to patient and sister that should improve in time as his body recovers from the bleeding in his brain     Na Goal: Greater than 140     Bray: n        HEMATOLOGY: no active bleeding     DVT ppx: LMWH      ID: afebrile, leukocytosis 12/30, pan-Cx ordered-negative to date, continue encourage incentive spirometry as tolerated. Lower extremity duplex negative for DVT. No si/sx of infection    Other: Appreciate derm consult. A1C 6.7- education, diet and lifestyle modifications- anticipate d/c on Metformin 500bid as FS range 120s-200s on  insulin.     DISPOSITION: Social work following with anticipated plan per family for return to [facility] for therapy once wheelchair in place, on order at this time.  Flight booked for 1/8/18 per family plan is to discharge him Sunday evening with a benadryl order to give him for the long flight. Stable for discharge.      CORE MEASURES:        Admission NIHSS: No     TPA: NO      LDL/HDL: NO     Depression Screen: 0     Statin Therapy: NO     Dysphagia Screen: PASS     Smoking  NO      Afib  NO     Stroke Education YES ASSESSMENT:   58 Y/O man with vascular risk factors of age and hypertension reports to have noticed one day of dizziness prior to admission. Patient presented to ED when the dizziness persisted and was found to have cerebellar hemorrhage and 4th ventricle IVH (Vermian Hemorrhage) for which he was transferred to [facility]. Impression: Nontraumatic cerebellar ICH. Paramedian/vermian cerebellar intracerebral hemorrhage with associated IVH - likely etiology being microangiopathy related to chronic hypertension leading to hypertensive intracerebral hemorrhage.    NEURO: Neurologically improved vs admission but remains with significant gait instability, stable cerebral edema with mass effect and brain compression leading to effacement of the fourth ventricle with stable hydrocephalus  s/p cerebral angiography 12/20: demonstrated normal pial and dural surfaces. No aneurysms. No venous occlusions BP goal - gradual normotension. Atorvastatin 20 mg at bedtime considering likely newly diagnosed diabetes and 10 years ASCVD risk being 10.4%, Plan to obtain repeat neuroimaging in 4-6 weeks to r/o underlying pathology. PMR for AR.     ANTITHROMBOTIC THERAPY: None in the setting of ICH and no specific indications at this time    PULMONARY: Protecting airway, saturating well on room air.     CARDIOVASCULAR: TTE - unremarkable and no evidence of LVH, Continue cardiac monitoring, slowly titrate antihypertensive regimen accordingly       SBP goal: Gradual normotension     GASTROINTESTINAL: dysphagia screen passed. Tolerating diet      Diet: Regular. Consistent Carb.    RENAL: maintain adequate hydration, good urine output, avoid hyponatremia; c/o night incontinence, explained to patient and sister that should improve in time as his body recovers from the bleeding in his brain     Na Goal: Greater than 140     Bray: n        HEMATOLOGY: no active bleeding     DVT ppx: LMWH      ID: afebrile, leukocytosis 12/30, pan-Cx ordered-negative to date, continue encourage incentive spirometry as tolerated. Lower extremity duplex negative for DVT. No si/sx of infection    Other: Appreciate derm consult. A1C 6.7- education, diet and lifestyle modifications- anticipate d/c on Metformin 500bid as FS range 120s-200s on  insulin.     DISPOSITION: Social work following with anticipated plan per family for return to [facility] for therapy once wheelchair in place, on order at this time.  Flight booked for 1/8/18 per family plan is to discharge him Sunday evening with a benadryl order to give him for the long flight. Stable for discharge.      CORE MEASURES:        Admission NIHSS: No     TPA: NO      LDL/HDL: NO     Depression Screen: 0     Statin Therapy: NO     Dysphagia Screen: PASS     Smoking  NO      Afib  NO     Stroke Education YES

## 2018-01-04 NOTE — CHART NOTE - NSCHARTNOTEFT_GEN_A_CORE
NUTRITION FOLLOW UP NOTE  Pt seen for length of stay.   56 Y/O man with vascular risk factors of age and hypertension reports to have noticed one day of dizziness prior to admission. Patient presented to ED when the dizziness persisted and was found to have cerebellar hemorrhage and 4th ventricle IVH (Vermian Hemorrhage) for which he was transferred to [facility]. s/p cerebral angiography 12/20: demonstrated normal pial and dural surfaces. No aneurysms. No venous occlusions. No history of diabetes, noted HbA1c 6.7%.    Source: Patient [ ]    Family [ ]     other [ x] comprehensive chart review, RN  Pt asleep, talked to Sister Froylan with  Allen ID#[id number]    Diet : Consistent Carbohydrate (with evening snacks)       Sister reports Pt had vomiting 2 days ago and on the 29th. States he did not eat lunch or dinner yesterday as he did not feel well but was able to eat breakfast today. Pt also eating foods brought in by family, a lot of fruit. Per chart, last BM 1/2.       PO intake:  < 50% [ ] 50-75% [ ]   % [ ]  other : varied, 0-75%     Source for PO intake [ ] Patient [x] family [ ] chart [ ] staff [ ] other    No new wt.    Pertinent Medications: MEDICATIONS  (STANDING):  amLODIPine   Tablet 10 milliGRAM(s) Oral daily  atorvastatin 20 milliGRAM(s) Oral at bedtime  dextrose 50% Injectable 12.5 Gram(s) IV Push once  dextrose 50% Injectable 25 Gram(s) IV Push once  dextrose 50% Injectable 25 Gram(s) IV Push once  docusate sodium 100 milliGRAM(s) Oral daily  enoxaparin Injectable 40 milliGRAM(s) SubCutaneous <User Schedule>  famotidine    Tablet 20 milliGRAM(s) Oral daily  influenza   Vaccine 0.5 milliLiter(s) IntraMuscular once  insulin lispro (HumaLOG) corrective regimen sliding scale   SubCutaneous Before meals and at bedtime  insulin NPH human recombinant 4 Unit(s) SubCutaneous Before meals and at bedtime  lisinopril 40 milliGRAM(s) Oral daily  metoprolol succinate ER 25 milliGRAM(s) Oral daily  senna 2 Tablet(s) Oral at bedtime  triamcinolone 0.1% Ointment 1 Application(s) Topical two times a day    MEDICATIONS  (PRN):  acetaminophen   Tablet 650 milliGRAM(s) Oral every 6 hours PRN For Temp greater than 38 C (100.4 F)  acetaminophen   Tablet. 650 milliGRAM(s) Oral every 6 hours PRN Mild Pain (1 - 3)  aluminum hydroxide/magnesium hydroxide/simethicone Suspension 30 milliLiter(s) Oral every 6 hours PRN Dyspepsia  dextrose Gel 1 Dose(s) Oral once PRN Blood Glucose LESS THAN 70 milliGRAM(s)/deciliter  glucagon  Injectable 1 milliGRAM(s) IntraMuscular once PRN Glucose LESS THAN 70 milligrams/deciliter  ondansetron Injectable 4 milliGRAM(s) IV Push every 6 hours PRN Nausea and/or Vomiting    Pertinent Labs:  Finger sticks: 1/4: 148mg/dL, 1/3: 115-204mg/dL    Skin: intact. no edema noted.    Estimated Needs:   [ x] no change since previous assessment  [ ] recalculated:       Previous Nutrition Diagnosis:     [ ] Inadequate Energy Intake [ ]Inadequate Oral Intake [ ] Excessive Energy Intake     [ ] Underweight [ ] Increased Nutrient Needs [ ] Overweight/Obesity     [ ] Altered GI Function [ ] Unintended Weight Loss [ x] Food & Nutrition Related Knowledge Deficit [ ] Malnutrition          Nutrition Diagnosis is [ x] ongoing  [ ] resolved [ ] not applicable          New Nutrition Diagnosis: [ ] not applicable    [ ] Inadequate Protein Energy Intake [x ]Inadequate Oral Intake [ ] Excessive Energy Intake     [ ] Underweight [ ] Increased Nutrient Needs [ ] Overweight/Obesity     [ ] Altered GI Function [ ] Unintended Weight Loss [ ] Food & Nutrition Related Knowledge Deficit[ ] Limited Adherence to nutrition related recommendations [ ] Malnutrition  [ ] other: Free text       Related to: nausea, decreased appetite, dislikes American foods     As evidenced by: <75% intake   Interventions:     Recommend    [ ] Change Diet To:    [ ] Nutrition Supplement    [x ] Nutrition Support: Continue to provide food preferences within diet restrictions as feasible. Continue to encourage good po intake at meals. Discussed general healthy diet for blood sugar control with sister.    [x ] Other: Recommend getting an endocrine consult to discuss HbA1c of 6.7%.       Monitoring and Evaluation:     [x ] PO intake [ x] Tolerance to diet prescription [ x weights [x ] follow up per protocol    RD to remain available for further nutritional interventions as indicated/requested by medical team/pt.   Manuel Mitchell, RD, CDN, CDE. Pager: [phone]

## 2018-01-04 NOTE — PROGRESS NOTE ADULT - SUBJECTIVE AND OBJECTIVE BOX
THE PATIENT WAS SEEN AND EXAMINED BY ME WITH THE HOUSESTAFF AND STROKE TEAM DURING MORNING ROUNDS.   HPI:  57 year old male pmhx of htn coming in with 1 day of dizziness prior to admission. 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.  Patient stated he takes no medication and has no pmhx but reportedly has hx of htn.Denied generalized weakness, and nausea. No fall or recent trauma. No numbness, tingling, weakness. No antiplatelets or anticoagulants. Patient  denied and headache, n/v, blurry vision, double vision, numbness or weakness      SUBJECTIVE: No events overnight.  No new neurologic complaints.      acetaminophen   Tablet 650 milliGRAM(s) Oral every 6 hours PRN  acetaminophen   Tablet. 650 milliGRAM(s) Oral every 6 hours PRN  aluminum hydroxide/magnesium hydroxide/simethicone Suspension 30 milliLiter(s) Oral every 6 hours PRN  amLODIPine   Tablet 10 milliGRAM(s) Oral daily  atorvastatin 20 milliGRAM(s) Oral at bedtime  dextrose 50% Injectable 12.5 Gram(s) IV Push once  dextrose 50% Injectable 25 Gram(s) IV Push once  dextrose 50% Injectable 25 Gram(s) IV Push once  dextrose Gel 1 Dose(s) Oral once PRN  docusate sodium 100 milliGRAM(s) Oral daily  enoxaparin Injectable 40 milliGRAM(s) SubCutaneous <User Schedule>  glucagon  Injectable 1 milliGRAM(s) IntraMuscular once PRN  influenza   Vaccine 0.5 milliLiter(s) IntraMuscular once  insulin lispro (HumaLOG) corrective regimen sliding scale   SubCutaneous Before meals and at bedtime  insulin NPH human recombinant 4 Unit(s) SubCutaneous Before meals and at bedtime  lisinopril 40 milliGRAM(s) Oral daily  metoprolol succinate ER 25 milliGRAM(s) Oral daily  ondansetron Injectable 4 milliGRAM(s) IV Push every 6 hours PRN  senna 2 Tablet(s) Oral at bedtime  triamcinolone 0.1% Ointment 1 Application(s) Topical two times a day      PHYSICAL EXAM:   Vital Signs Last 24 Hrs  T(C): 36.8 (04 Jan 2018 04:32), Max: 36.8 (03 Jan 2018 15:45)  T(F): 98.2 (04 Jan 2018 04:32), Max: 98.3 (03 Jan 2018 15:45)  HR: 75 (04 Jan 2018 04:32) (65 - 79)  BP: 134/84 (04 Jan 2018 04:32) (127/86 - 143/83)  BP(mean): --  RR: 20 (04 Jan 2018 04:32) (18 - 20)  SpO2: 95% (04 Jan 2018 04:32) (95% - 98%)    General: Resting in bed. No acute distress  HEENT: PERRL,  EOM intact, visual fields full    NEUROLOGICAL EXAM:   Mental status: Cantonese speaking, alert, oriented x3. Able to follow simple commands. Speech is fluent but with mild dysarthria   Cranial Nerves: Left facial droop, no nystagmus, no dysarthria, tongue is midline.  Motor exam: Normal tone, no drift. Strength 5/5 throughout.  Sensation: Intact to light touch   Coordination/ Gait: Bilateral ataxia/dysmetria, worse on the left, Ataxic gait with mild truncal ataxia     LABS:         Hemoglobin A1C, Whole Blood: 6.7 % (12-17 @ 06:40)      IMAGING: Reviewed by me.   CT BRAIN (12/19/2017)  History: Follow-up parenchymal hemorrhage. Multiple axial sections were performed from base of skull to vertex without contrast enhancement. This exam is compared with prior noncontrast head CT performed on December 18, 2017. Parenchymal hemorrhage and surrounding edema is again seen in the   posterior fossa midline. This area. Or hemorrhage measures approximately .6 x 3.0 cm and previously measured approximately 3.4 x 3.1 cm. Mass effect on the fourth ventricles again seen and unchanged. The size and configuration of the prominent lateral third ventricles are again seen and unchanged.  Periventricular white matter lucency is again seen and unchanged. Evaluation of the osseous structures with the appropriate window appears unremarkable. The visualized paranasal sinuses mastoid and middle ear regions appear clear    CT Angio Neck w/ IV Cont (12.15.17) CT BRAIN: Acute intra-axial hemorrhage in the cerebellar vermis causing mass effect on the fourth ventricle. Mild prominence of the third and lateral ventricles. Heterogeneous calcified mass in the extracalvarial soft tissues of the right occipital bone.  CTA BRAIN: Patent intracranial circulation. No flow-limiting stenosis or occlusion.   CTA NECK: Patent cervical vasculature. No flow limiting stenosis or occlusion.   Cerebral Angiogram (12.20.17) : Preliminary report normal pial and dural surfaces. No aneurysms. No venous occlusions.     MR Head No Cont (12.22.17)   As described on patient's CT midline cerebellar hemorrhage   with mass effect on the fourth ventricle and some mild rostral   hydrocephalus suggested with mild dilatation of the temporal horns and   the lateral ventricles. There is fairly prominent microvascular ischemic   type changes identified given the patient's age. Punctate focus of signal   hyperintensity on diffusion in the left parietal occipital region   consistent with an acute punctate focus of infarct.   CT Head No Cont (12.28.17)  Resolving vermian hemorrhage compared with 12/19/2017. No change in mass   effect on the fourth ventricle or rostral hydrocephalus. THE PATIENT WAS SEEN AND EXAMINED BY ME WITH THE HOUSESTAFF AND STROKE TEAM DURING MORNING ROUNDS.   HPI:  57 year old male pmhx of htn coming in with 1 day of dizziness prior to admission. Patient presented to ED when the dizziness persisted and was found to have cerebellar hemorrhage and 4th ventricle IVH (Vermian Hemorrhage) for which he was transferred to [facility].  Patient stated he takes no medication and has no pmhx but reportedly has hx of htn.Denied generalized weakness, and nausea. No fall or recent trauma. No numbness, tingling, weakness. No antiplatelets or anticoagulants. Patient  denied and headache, n/v, blurry vision, double vision, numbness or weakness    SUBJECTIVE: No events overnight.  No new neurologic complaints.  Sister at bedside states that he c/o occasional headaches, no c/o headache today.    acetaminophen   Tablet 650 milliGRAM(s) Oral every 6 hours PRN  acetaminophen   Tablet. 650 milliGRAM(s) Oral every 6 hours PRN  aluminum hydroxide/magnesium hydroxide/simethicone Suspension 30 milliLiter(s) Oral every 6 hours PRN  amLODIPine   Tablet 10 milliGRAM(s) Oral daily  atorvastatin 20 milliGRAM(s) Oral at bedtime  dextrose 50% Injectable 12.5 Gram(s) IV Push once  dextrose 50% Injectable 25 Gram(s) IV Push once  dextrose 50% Injectable 25 Gram(s) IV Push once  dextrose Gel 1 Dose(s) Oral once PRN  docusate sodium 100 milliGRAM(s) Oral daily  enoxaparin Injectable 40 milliGRAM(s) SubCutaneous <User Schedule>  glucagon  Injectable 1 milliGRAM(s) IntraMuscular once PRN  influenza   Vaccine 0.5 milliLiter(s) IntraMuscular once  insulin lispro (HumaLOG) corrective regimen sliding scale   SubCutaneous Before meals and at bedtime  insulin NPH human recombinant 4 Unit(s) SubCutaneous Before meals and at bedtime  lisinopril 40 milliGRAM(s) Oral daily  metoprolol succinate ER 25 milliGRAM(s) Oral daily  ondansetron Injectable 4 milliGRAM(s) IV Push every 6 hours PRN  senna 2 Tablet(s) Oral at bedtime  triamcinolone 0.1% Ointment 1 Application(s) Topical two times a day      PHYSICAL EXAM:   Vital Signs Last 24 Hrs  T(C): 36.8 (04 Jan 2018 04:32), Max: 36.8 (03 Jan 2018 15:45)  T(F): 98.2 (04 Jan 2018 04:32), Max: 98.3 (03 Jan 2018 15:45)  HR: 75 (04 Jan 2018 04:32) (65 - 79)  BP: 134/84 (04 Jan 2018 04:32) (127/86 - 143/83)  BP(mean): --  RR: 20 (04 Jan 2018 04:32) (18 - 20)  SpO2: 95% (04 Jan 2018 04:32) (95% - 98%)    General: Resting in bed. No acute distress  HEENT: PERRL,  EOM intact, visual fields full    NEUROLOGICAL EXAM:   Mental status: Cantonese speaking, alert, oriented x3. Able to follow simple commands. Speech is fluent but with mild dysarthria   Cranial Nerves: Left facial droop, no nystagmus, no dysarthria, tongue is midline.  Motor exam: Normal tone, no drift. Strength 5/5 throughout.  Sensation: Intact to light touch   Coordination/ Gait: Bilateral ataxia/dysmetria, worse on the left, Ataxic gait with mild truncal ataxia     LABS:         Hemoglobin A1C, Whole Blood: 6.7 % (12-17 @ 06:40)      IMAGING: Reviewed by me.   CT BRAIN (12/19/2017)  History: Follow-up parenchymal hemorrhage. Multiple axial sections were performed from base of skull to vertex without contrast enhancement. This exam is compared with prior noncontrast head CT performed on December 18, 2017. Parenchymal hemorrhage and surrounding edema is again seen in the   posterior fossa midline. This area. Or hemorrhage measures approximately .6 x 3.0 cm and previously measured approximately 3.4 x 3.1 cm. Mass effect on the fourth ventricles again seen and unchanged. The size and configuration of the prominent lateral third ventricles are again seen and unchanged.  Periventricular white matter lucency is again seen and unchanged. Evaluation of the osseous structures with the appropriate window appears unremarkable. The visualized paranasal sinuses mastoid and middle ear regions appear clear    CT Angio Neck w/ IV Cont (12.15.17) CT BRAIN: Acute intra-axial hemorrhage in the cerebellar vermis causing mass effect on the fourth ventricle. Mild prominence of the third and lateral ventricles. Heterogeneous calcified mass in the extracalvarial soft tissues of the right occipital bone.  CTA BRAIN: Patent intracranial circulation. No flow-limiting stenosis or occlusion.   CTA NECK: Patent cervical vasculature. No flow limiting stenosis or occlusion.   Cerebral Angiogram (12.20.17) : Preliminary report normal pial and dural surfaces. No aneurysms. No venous occlusions.     MR Head No Cont (12.22.17)   As described on patient's CT midline cerebellar hemorrhage   with mass effect on the fourth ventricle and some mild rostral   hydrocephalus suggested with mild dilatation of the temporal horns and   the lateral ventricles. There is fairly prominent microvascular ischemic   type changes identified given the patient's age. Punctate focus of signal   hyperintensity on diffusion in the left parietal occipital region   consistent with an acute punctate focus of infarct.   CT Head No Cont (12.28.17)  Resolving vermian hemorrhage compared with 12/19/2017. No change in mass   effect on the fourth ventricle or rostral hydrocephalus.

## 2018-01-05 LAB
GLUCOSE BLDC GLUCOMTR-MCNC: 120 MG/DL — HIGH (ref 70–99)
GLUCOSE BLDC GLUCOMTR-MCNC: 135 MG/DL — HIGH (ref 70–99)
GLUCOSE BLDC GLUCOMTR-MCNC: 187 MG/DL — HIGH (ref 70–99)
GLUCOSE BLDC GLUCOMTR-MCNC: 187 MG/DL — HIGH (ref 70–99)
GLUCOSE BLDC GLUCOMTR-MCNC: 224 MG/DL — HIGH (ref 70–99)

## 2018-01-05 PROCEDURE — 99233 SBSQ HOSP IP/OBS HIGH 50: CPT

## 2018-01-05 RX ADMIN — Medication 2: at 23:20

## 2018-01-05 RX ADMIN — AMLODIPINE BESYLATE 10 MILLIGRAM(S): 2.5 TABLET ORAL at 21:15

## 2018-01-05 RX ADMIN — Medication 25 MILLIGRAM(S): at 05:22

## 2018-01-05 RX ADMIN — Medication 2: at 13:04

## 2018-01-05 RX ADMIN — HUMAN INSULIN 4 UNIT(S): 100 INJECTION, SUSPENSION SUBCUTANEOUS at 23:21

## 2018-01-05 RX ADMIN — Medication 650 MILLIGRAM(S): at 22:30

## 2018-01-05 RX ADMIN — Medication 100 MILLIGRAM(S): at 13:03

## 2018-01-05 RX ADMIN — HUMAN INSULIN 4 UNIT(S): 100 INJECTION, SUSPENSION SUBCUTANEOUS at 13:04

## 2018-01-05 RX ADMIN — HUMAN INSULIN 4 UNIT(S): 100 INJECTION, SUSPENSION SUBCUTANEOUS at 17:47

## 2018-01-05 RX ADMIN — HUMAN INSULIN 4 UNIT(S): 100 INJECTION, SUSPENSION SUBCUTANEOUS at 08:58

## 2018-01-05 RX ADMIN — Medication 650 MILLIGRAM(S): at 21:15

## 2018-01-05 RX ADMIN — ENOXAPARIN SODIUM 40 MILLIGRAM(S): 100 INJECTION SUBCUTANEOUS at 17:46

## 2018-01-05 RX ADMIN — ATORVASTATIN CALCIUM 20 MILLIGRAM(S): 80 TABLET, FILM COATED ORAL at 21:15

## 2018-01-05 RX ADMIN — Medication 1 APPLICATION(S): at 17:47

## 2018-01-05 RX ADMIN — LISINOPRIL 40 MILLIGRAM(S): 2.5 TABLET ORAL at 13:04

## 2018-01-05 RX ADMIN — SENNA PLUS 2 TABLET(S): 8.6 TABLET ORAL at 21:15

## 2018-01-05 RX ADMIN — FAMOTIDINE 20 MILLIGRAM(S): 10 INJECTION INTRAVENOUS at 13:03

## 2018-01-05 NOTE — PROGRESS NOTE ADULT - ASSESSMENT
ASSESSMENT:   58 Y/O man with vascular risk factors of age and hypertension reports to have noticed one day of dizziness prior to admission. Patient presented to ED when the dizziness persisted and was found to have cerebellar hemorrhage and 4th ventricle IVH (Vermian Hemorrhage) for which he was transferred to [facility]. Impression: Nontraumatic cerebellar ICH. Paramedian/vermian cerebellar intracerebral hemorrhage with associated IVH - likely etiology being microangiopathy related to chronic hypertension leading to hypertensive intracerebral hemorrhage.    NEURO: Neurologically improved vs admission but remains with significant gait instability, stable cerebral edema with mass effect and brain compression leading to effacement of the fourth ventricle with stable hydrocephalus  s/p cerebral angiography 12/20: demonstrated normal pial and dural surfaces. No aneurysms. No venous occlusions BP goal - gradual normotension. Atorvastatin 20 mg at bedtime considering likely newly diagnosed diabetes and 10 years ASCVD risk being 10.4%, Plan to obtain repeat neuroimaging in 4-6 weeks to r/o underlying pathology. PMR for AR.     ANTITHROMBOTIC THERAPY: None in the setting of ICH and no specific indications at this time    PULMONARY: Protecting airway, saturating well on room air.     CARDIOVASCULAR: TTE - unremarkable and no evidence of LVH, Continue cardiac monitoring, slowly titrate antihypertensive regimen accordingly       SBP goal: Gradual normotension     GASTROINTESTINAL: dysphagia screen passed. Tolerating diet      Diet: Regular. Consistent Carb.    RENAL: maintain adequate hydration, good urine output, avoid hyponatremia; c/o night incontinence, explained to patient and sister that should improve in time as his body recovers from the bleeding in his brain     Na Goal: Greater than 140     Bray: n        HEMATOLOGY: no active bleeding     DVT ppx: LMWH      ID: afebrile, leukocytosis 12/30, pan-Cx ordered-negative to date, continue encourage incentive spirometry as tolerated. Lower extremity duplex negative for DVT. No si/sx of infection    Other: Appreciate derm consult. A1C 6.7- education, diet and lifestyle modifications- anticipate d/c on Metformin 500bid as FS range 120s-200s on  insulin.     DISPOSITION: Social work following with anticipated plan per family for return to [facility] for therapy once wheelchair in place, on order at this time.  Flight booked for 1/8/18 per family plan is to discharge him Sunday evening with a benadryl order to give him for the long flight. Stable for discharge.      CORE MEASURES:        Admission NIHSS: No     TPA: NO      LDL/HDL: NO     Depression Screen: 0     Statin Therapy: NO     Dysphagia Screen: PASS     Smoking  NO      Afib  NO     Stroke Education YES ASSESSMENT:   56 Y/O man with vascular risk factors of age and hypertension reports to have noticed one day of dizziness prior to admission. Patient presented to ED when the dizziness persisted and was found to have cerebellar hemorrhage and 4th ventricle IVH (Vermian Hemorrhage) for which he was transferred to [facility]. Impression: Nontraumatic cerebellar ICH. Paramedian/vermian cerebellar intracerebral hemorrhage with associated IVH - likely etiology being microangiopathy related to chronic hypertension leading to hypertensive intracerebral hemorrhage.    NEURO: Neurologically improved vs admission but remains with significant gait instability, stable cerebral edema with mass effect and brain compression leading to effacement of the fourth ventricle with stable hydrocephalus  s/p cerebral angiography 12/20: demonstrated normal pial and dural surfaces. No aneurysms. No venous occlusions BP goal - gradual normotension. Atorvastatin 20 mg at bedtime considering likely newly diagnosed diabetes and 10 years ASCVD risk being 10.4%, Plan to obtain repeat neuroimaging in 4-6 weeks to r/o underlying pathology. PMR for AR.     ANTITHROMBOTIC THERAPY: None in the setting of ICH and no specific indications at this time    PULMONARY: Protecting airway, saturating well on room air.     CARDIOVASCULAR: TTE - unremarkable and no evidence of LVH, Continue cardiac monitoring, slowly titrate antihypertensive regimen accordingly       SBP goal: Gradual normotension     GASTROINTESTINAL: dysphagia screen passed. Tolerating diet      Diet: Regular. Consistent Carb.    RENAL: maintain adequate hydration, good urine output, avoid hyponatremia; c/o night incontinence, explained to patient and sister that should improve in time as his body recovers from the bleeding in his brain     Na Goal: Greater than 140     Bray: n        HEMATOLOGY: no active bleeding     DVT ppx: LMWH      ID: afebrile, leukocytosis on 12/30, pan-Cx ordered-negative, continue encourage incentive spirometry as tolerated. Lower extremity duplex negative for DVT. No si/sx of infection    Other: Appreciate derm consult. A1C 6.7- education, diet and lifestyle modifications- anticipate d/c on Metformin 500bid as FS range 120s-200s on  insulin.     DISPOSITION: Social work following with anticipated plan per family for return to [facility] for therapy once wheelchair in place, on order at this time.  Flight booked for 1/8/18 per family plan is to discharge him Sunday evening with a benadryl order to give him for the long flight. Stable for discharge.      CORE MEASURES:        Admission NIHSS: No     TPA: NO      LDL/HDL: NO     Depression Screen: 0     Statin Therapy: NO     Dysphagia Screen: PASS     Smoking  NO      Afib  NO     Stroke Education YES

## 2018-01-05 NOTE — PROGRESS NOTE ADULT - SUBJECTIVE AND OBJECTIVE BOX
THE PATIENT WAS SEEN AND EXAMINED BY ME WITH THE HOUSESTAFF AND STROKE TEAM DURING MORNING ROUNDS.   HPI:  57 year old male pmhx of htn coming in with 1 day of dizziness prior to admission. Patient presented to ED when the dizziness persisted and was found to have cerebellar hemorrhage and 4th ventricle IVH (Vermian Hemorrhage) for which he was transferred to [facility].  Patient stated he takes no medication and has no pmhx but reportedly has hx of htn.Denied generalized weakness, and nausea. No fall or recent trauma. No numbness, tingling, weakness. No antiplatelets or anticoagulants. Patient  denied and headache, n/v, blurry vision, double vision, numbness or weakness      SUBJECTIVE: No events overnight.  No new neurologic complaints.      acetaminophen   Tablet 650 milliGRAM(s) Oral every 6 hours PRN  acetaminophen   Tablet. 650 milliGRAM(s) Oral every 6 hours PRN  aluminum hydroxide/magnesium hydroxide/simethicone Suspension 30 milliLiter(s) Oral every 6 hours PRN  amLODIPine   Tablet 10 milliGRAM(s) Oral daily  atorvastatin 20 milliGRAM(s) Oral at bedtime  dextrose 50% Injectable 12.5 Gram(s) IV Push once  dextrose 50% Injectable 25 Gram(s) IV Push once  dextrose 50% Injectable 25 Gram(s) IV Push once  dextrose Gel 1 Dose(s) Oral once PRN  docusate sodium 100 milliGRAM(s) Oral daily  enoxaparin Injectable 40 milliGRAM(s) SubCutaneous <User Schedule>  famotidine    Tablet 20 milliGRAM(s) Oral daily  glucagon  Injectable 1 milliGRAM(s) IntraMuscular once PRN  influenza   Vaccine 0.5 milliLiter(s) IntraMuscular once  insulin lispro (HumaLOG) corrective regimen sliding scale   SubCutaneous Before meals and at bedtime  insulin NPH human recombinant 4 Unit(s) SubCutaneous Before meals and at bedtime  lisinopril 40 milliGRAM(s) Oral daily  metoprolol succinate ER 25 milliGRAM(s) Oral daily  ondansetron Injectable 4 milliGRAM(s) IV Push every 6 hours PRN  senna 2 Tablet(s) Oral at bedtime  triamcinolone 0.1% Ointment 1 Application(s) Topical two times a day      PHYSICAL EXAM:   Vital Signs Last 24 Hrs  T(C): 36.7 (05 Jan 2018 05:08), Max: 36.8 (04 Jan 2018 08:12)  T(F): 98.1 (05 Jan 2018 05:08), Max: 98.2 (04 Jan 2018 08:12)  HR: 69 (05 Jan 2018 05:08) (59 - 69)  BP: 138/82 (05 Jan 2018 05:08) (111/73 - 138/82)  BP(mean): --  RR: 18 (05 Jan 2018 05:08) (18 - 18)  SpO2: 97% (05 Jan 2018 05:08) (94% - 98%)    General: Resting in bed. No acute distress  HEENT: PERRL,  EOM intact, visual fields full    NEUROLOGICAL EXAM:   Mental status: Cantonese speaking, alert, oriented x3. Able to follow simple commands. Speech is fluent but with mild dysarthria   Cranial Nerves: Left facial droop, no nystagmus, no dysarthria, tongue is midline.  Motor exam: Normal tone, no drift. Strength 5/5 throughout.  Sensation: Intact to light touch   Coordination/ Gait: Bilateral ataxia/dysmetria, worse on the left, Ataxic gait with mild truncal ataxia       LABS:         Hemoglobin A1C, Whole Blood: 6.7 % (12-17 @ 06:40)      IMAGING: Reviewed by me.     CT BRAIN (12/19/2017)  History: Follow-up parenchymal hemorrhage. Multiple axial sections were performed from base of skull to vertex without contrast enhancement. This exam is compared with prior noncontrast head CT performed on December 18, 2017. Parenchymal hemorrhage and surrounding edema is again seen in the   posterior fossa midline. This area. Or hemorrhage measures approximately .6 x 3.0 cm and previously measured approximately 3.4 x 3.1 cm. Mass effect on the fourth ventricles again seen and unchanged. The size and configuration of the prominent lateral third ventricles are again seen and unchanged.  Periventricular white matter lucency is again seen and unchanged. Evaluation of the osseous structures with the appropriate window appears unremarkable. The visualized paranasal sinuses mastoid and middle ear regions appear clear    CT Angio Neck w/ IV Cont (12.15.17) CT BRAIN: Acute intra-axial hemorrhage in the cerebellar vermis causing mass effect on the fourth ventricle. Mild prominence of the third and lateral ventricles. Heterogeneous calcified mass in the extracalvarial soft tissues of the right occipital bone.  CTA BRAIN: Patent intracranial circulation. No flow-limiting stenosis or occlusion.   CTA NECK: Patent cervical vasculature. No flow limiting stenosis or occlusion.   Cerebral Angiogram (12.20.17) : Preliminary report normal pial and dural surfaces. No aneurysms. No venous occlusions.     MR Head No Cont (12.22.17)   As described on patient's CT midline cerebellar hemorrhage   with mass effect on the fourth ventricle and some mild rostral   hydrocephalus suggested with mild dilatation of the temporal horns and   the lateral ventricles. There is fairly prominent microvascular ischemic   type changes identified given the patient's age. Punctate focus of signal   hyperintensity on diffusion in the left parietal occipital region   consistent with an acute punctate focus of infarct.   CT Head No Cont (12.28.17)  Resolving vermian hemorrhage compared with 12/19/2017. No change in mass   effect on the fourth ventricle or rostral hydrocephalus. THE PATIENT WAS SEEN AND EXAMINED BY ME WITH THE HOUSESTAFF AND STROKE TEAM DURING MORNING ROUNDS.   HPI:  57 year old male pmhx of htn coming in with 1 day of dizziness prior to admission. Patient presented to ED when the dizziness persisted and was found to have cerebellar hemorrhage and 4th ventricle IVH (Vermian Hemorrhage) for which he was transferred to [facility].  Patient stated he takes no medication and has no pmhx but reportedly has hx of htn.Denied generalized weakness, and nausea. No fall or recent trauma. No numbness, tingling, weakness. No antiplatelets or anticoagulants. Patient  denied and headache, n/v, blurry vision, double vision, numbness or weakness    SUBJECTIVE: No events overnight.  No new neurologic complaints.      acetaminophen   Tablet 650 milliGRAM(s) Oral every 6 hours PRN  acetaminophen   Tablet. 650 milliGRAM(s) Oral every 6 hours PRN  aluminum hydroxide/magnesium hydroxide/simethicone Suspension 30 milliLiter(s) Oral every 6 hours PRN  amLODIPine   Tablet 10 milliGRAM(s) Oral daily  atorvastatin 20 milliGRAM(s) Oral at bedtime  dextrose 50% Injectable 12.5 Gram(s) IV Push once  dextrose 50% Injectable 25 Gram(s) IV Push once  dextrose 50% Injectable 25 Gram(s) IV Push once  dextrose Gel 1 Dose(s) Oral once PRN  docusate sodium 100 milliGRAM(s) Oral daily  enoxaparin Injectable 40 milliGRAM(s) SubCutaneous <User Schedule>  famotidine    Tablet 20 milliGRAM(s) Oral daily  glucagon  Injectable 1 milliGRAM(s) IntraMuscular once PRN  influenza   Vaccine 0.5 milliLiter(s) IntraMuscular once  insulin lispro (HumaLOG) corrective regimen sliding scale   SubCutaneous Before meals and at bedtime  insulin NPH human recombinant 4 Unit(s) SubCutaneous Before meals and at bedtime  lisinopril 40 milliGRAM(s) Oral daily  metoprolol succinate ER 25 milliGRAM(s) Oral daily  ondansetron Injectable 4 milliGRAM(s) IV Push every 6 hours PRN  senna 2 Tablet(s) Oral at bedtime  triamcinolone 0.1% Ointment 1 Application(s) Topical two times a day    PHYSICAL EXAM:   Vital Signs Last 24 Hrs  T(C): 36.7 (05 Jan 2018 05:08), Max: 36.8 (04 Jan 2018 08:12)  T(F): 98.1 (05 Jan 2018 05:08), Max: 98.2 (04 Jan 2018 08:12)  HR: 69 (05 Jan 2018 05:08) (59 - 69)  BP: 138/82 (05 Jan 2018 05:08) (111/73 - 138/82)  BP(mean): --  RR: 18 (05 Jan 2018 05:08) (18 - 18)  SpO2: 97% (05 Jan 2018 05:08) (94% - 98%)    General: Resting in bed. No acute distress  HEENT: PERRL EOM intact, visual fields full    NEUROLOGICAL EXAM:   Mental status: Cantonese speaking, alert, oriented x3. Able to follow simple commands. Speech is fluent but with mild dysarthria   Cranial Nerves: Left facial droop, no nystagmus, no dysarthria, tongue is midline.  Motor exam: Normal tone, no drift. Strength 5/5 throughout.  Sensation: Intact to light touch   Coordination/ Gait: Bilateral ataxia/dysmetria, worse on the left, Ataxic gait with mild truncal ataxia       LABS:         Hemoglobin A1C, Whole Blood: 6.7 % (12-17 @ 06:40)      IMAGING: Reviewed by me.     CT BRAIN (12/19/2017)  History: Follow-up parenchymal hemorrhage. Multiple axial sections were performed from base of skull to vertex without contrast enhancement. This exam is compared with prior noncontrast head CT performed on December 18, 2017. Parenchymal hemorrhage and surrounding edema is again seen in the   posterior fossa midline. This area. Or hemorrhage measures approximately .6 x 3.0 cm and previously measured approximately 3.4 x 3.1 cm. Mass effect on the fourth ventricles again seen and unchanged. The size and configuration of the prominent lateral third ventricles are again seen and unchanged.  Periventricular white matter lucency is again seen and unchanged. Evaluation of the osseous structures with the appropriate window appears unremarkable. The visualized paranasal sinuses mastoid and middle ear regions appear clear    CT Angio Neck w/ IV Cont (12.15.17) CT BRAIN: Acute intra-axial hemorrhage in the cerebellar vermis causing mass effect on the fourth ventricle. Mild prominence of the third and lateral ventricles. Heterogeneous calcified mass in the extracalvarial soft tissues of the right occipital bone.  CTA BRAIN: Patent intracranial circulation. No flow-limiting stenosis or occlusion.   CTA NECK: Patent cervical vasculature. No flow limiting stenosis or occlusion.   Cerebral Angiogram (12.20.17) : Preliminary report normal pial and dural surfaces. No aneurysms. No venous occlusions.     MR Head No Cont (12.22.17)   As described on patient's CT midline cerebellar hemorrhage   with mass effect on the fourth ventricle and some mild rostral   hydrocephalus suggested with mild dilatation of the temporal horns and   the lateral ventricles. There is fairly prominent microvascular ischemic   type changes identified given the patient's age. Punctate focus of signal   hyperintensity on diffusion in the left parietal occipital region   consistent with an acute punctate focus of infarct.   CT Head No Cont (12.28.17)  Resolving vermian hemorrhage compared with 12/19/2017. No change in mass   effect on the fourth ventricle or rostral hydrocephalus.

## 2018-01-06 LAB
GLUCOSE BLDC GLUCOMTR-MCNC: 122 MG/DL — HIGH (ref 70–99)
GLUCOSE BLDC GLUCOMTR-MCNC: 170 MG/DL — HIGH (ref 70–99)
GLUCOSE BLDC GLUCOMTR-MCNC: 214 MG/DL — HIGH (ref 70–99)
GLUCOSE BLDC GLUCOMTR-MCNC: 234 MG/DL — HIGH (ref 70–99)

## 2018-01-06 PROCEDURE — 99233 SBSQ HOSP IP/OBS HIGH 50: CPT

## 2018-01-06 PROCEDURE — 70450 CT HEAD/BRAIN W/O DYE: CPT | Mod: 26

## 2018-01-06 RX ADMIN — ENOXAPARIN SODIUM 40 MILLIGRAM(S): 100 INJECTION SUBCUTANEOUS at 17:46

## 2018-01-06 RX ADMIN — HUMAN INSULIN 4 UNIT(S): 100 INJECTION, SUSPENSION SUBCUTANEOUS at 09:01

## 2018-01-06 RX ADMIN — ATORVASTATIN CALCIUM 20 MILLIGRAM(S): 80 TABLET, FILM COATED ORAL at 22:08

## 2018-01-06 RX ADMIN — HUMAN INSULIN 4 UNIT(S): 100 INJECTION, SUSPENSION SUBCUTANEOUS at 22:08

## 2018-01-06 RX ADMIN — SENNA PLUS 2 TABLET(S): 8.6 TABLET ORAL at 22:07

## 2018-01-06 RX ADMIN — AMLODIPINE BESYLATE 10 MILLIGRAM(S): 2.5 TABLET ORAL at 22:08

## 2018-01-06 RX ADMIN — FAMOTIDINE 20 MILLIGRAM(S): 10 INJECTION INTRAVENOUS at 12:52

## 2018-01-06 RX ADMIN — LISINOPRIL 40 MILLIGRAM(S): 2.5 TABLET ORAL at 12:52

## 2018-01-06 RX ADMIN — Medication 1 APPLICATION(S): at 17:46

## 2018-01-06 RX ADMIN — HUMAN INSULIN 4 UNIT(S): 100 INJECTION, SUSPENSION SUBCUTANEOUS at 17:46

## 2018-01-06 RX ADMIN — Medication 4: at 17:46

## 2018-01-06 RX ADMIN — Medication 25 MILLIGRAM(S): at 05:19

## 2018-01-06 RX ADMIN — Medication 1 APPLICATION(S): at 05:19

## 2018-01-06 RX ADMIN — Medication 100 MILLIGRAM(S): at 12:52

## 2018-01-06 RX ADMIN — Medication 4: at 09:01

## 2018-01-06 RX ADMIN — Medication 2: at 12:52

## 2018-01-06 NOTE — PROGRESS NOTE ADULT - SUBJECTIVE AND OBJECTIVE BOX
THE PATIENT WAS SEEN AND EXAMINED BY ME WITH THE HOUSESTAFF AND STROKE TEAM DURING MORNING ROUNDS.   HPI:  57 year old male pmhx of htn coming in with 1 day of dizziness prior to admission. 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.  Patient stated he takes no medication and has no pmhx but reportedly has hx of htn.Denied generalized weakness, and nausea. No fall or recent trauma. No numbness, tingling, weakness. No antiplatelets or anticoagulants. Patient  denied and headache, n/v, blurry vision, double vision, numbness or weakness      SUBJECTIVE: No events overnight.  No new neurologic complaints.      acetaminophen   Tablet 650 milliGRAM(s) Oral every 6 hours PRN  acetaminophen   Tablet. 650 milliGRAM(s) Oral every 6 hours PRN  aluminum hydroxide/magnesium hydroxide/simethicone Suspension 30 milliLiter(s) Oral every 6 hours PRN  amLODIPine   Tablet 10 milliGRAM(s) Oral daily  atorvastatin 20 milliGRAM(s) Oral at bedtime  dextrose 50% Injectable 12.5 Gram(s) IV Push once  dextrose 50% Injectable 25 Gram(s) IV Push once  dextrose 50% Injectable 25 Gram(s) IV Push once  dextrose Gel 1 Dose(s) Oral once PRN  docusate sodium 100 milliGRAM(s) Oral daily  enoxaparin Injectable 40 milliGRAM(s) SubCutaneous <User Schedule>  famotidine    Tablet 20 milliGRAM(s) Oral daily  glucagon  Injectable 1 milliGRAM(s) IntraMuscular once PRN  influenza   Vaccine 0.5 milliLiter(s) IntraMuscular once  insulin lispro (HumaLOG) corrective regimen sliding scale   SubCutaneous Before meals and at bedtime  insulin NPH human recombinant 4 Unit(s) SubCutaneous Before meals and at bedtime  lisinopril 40 milliGRAM(s) Oral daily  metoprolol succinate ER 25 milliGRAM(s) Oral daily  ondansetron Injectable 4 milliGRAM(s) IV Push every 6 hours PRN  senna 2 Tablet(s) Oral at bedtime  triamcinolone 0.1% Ointment 1 Application(s) Topical two times a day      PHYSICAL EXAM:   Vital Signs Last 24 Hrs  T(C): 36.5 (06 Jan 2018 07:52), Max: 36.9 (06 Jan 2018 00:23)  T(F): 97.7 (06 Jan 2018 07:52), Max: 98.4 (06 Jan 2018 00:23)  HR: 69 (06 Jan 2018 07:52) (61 - 69)  BP: 131/81 (06 Jan 2018 07:52) (124/79 - 146/93)  BP(mean): --  RR: 18 (06 Jan 2018 07:52) (18 - 18)  SpO2: 99% (06 Jan 2018 07:52) (98% - 100%)    General: Resting in bed. No acute distress  HEENT: PERRL EOM intact, visual fields full    NEUROLOGICAL EXAM:   Mental status: Cantonese speaking, alert, oriented x3. Able to follow simple commands. Speech is fluent but with mild dysarthria   Cranial Nerves: Left facial droop, no nystagmus, no dysarthria, tongue is midline.  Motor exam: Normal tone, no drift. Strength 5/5 throughout.  Sensation: Intact to light touch   Coordination/ Gait: Bilateral ataxia/dysmetria, worse on the left, Ataxic gait with mild truncal ataxia   LABS:         Hemoglobin A1C, Whole Blood: 6.7 % (12-17 @ 06:40)      IMAGING: Reviewed by me.   CT BRAIN (12/19/2017)  History: Follow-up parenchymal hemorrhage. Multiple axial sections were performed from base of skull to vertex without contrast enhancement. This exam is compared with prior noncontrast head CT performed on December 18, 2017. Parenchymal hemorrhage and surrounding edema is again seen in the   posterior fossa midline. This area. Or hemorrhage measures approximately .6 x 3.0 cm and previously measured approximately 3.4 x 3.1 cm. Mass effect on the fourth ventricles again seen and unchanged. The size and configuration of the prominent lateral third ventricles are again seen and unchanged.  Periventricular white matter lucency is again seen and unchanged. Evaluation of the osseous structures with the appropriate window appears unremarkable. The visualized paranasal sinuses mastoid and middle ear regions appear clear    CT Angio Neck w/ IV Cont (12.15.17) CT BRAIN: Acute intra-axial hemorrhage in the cerebellar vermis causing mass effect on the fourth ventricle. Mild prominence of the third and lateral ventricles. Heterogeneous calcified mass in the extracalvarial soft tissues of the right occipital bone.  CTA BRAIN: Patent intracranial circulation. No flow-limiting stenosis or occlusion.   CTA NECK: Patent cervical vasculature. No flow limiting stenosis or occlusion.   Cerebral Angiogram (12.20.17) : Preliminary report normal pial and dural surfaces. No aneurysms. No venous occlusions.     MR Head No Cont (12.22.17)   As described on patient's CT midline cerebellar hemorrhage   with mass effect on the fourth ventricle and some mild rostral   hydrocephalus suggested with mild dilatation of the temporal horns and   the lateral ventricles. There is fairly prominent microvascular ischemic   type changes identified given the patient's age. Punctate focus of signal   hyperintensity on diffusion in the left parietal occipital region   consistent with an acute punctate focus of infarct.   CT Head No Cont (12.28.17)  Resolving vermian hemorrhage compared with 12/19/2017. No change in mass   effect on the fourth ventricle or rostral hydrocephalus.

## 2018-01-06 NOTE — PROGRESS NOTE ADULT - ASSESSMENT
ASSESSMENT:   58 Y/O man with vascular risk factors of age and hypertension reports to have noticed one day of dizziness prior to admission. Patient presented to ED when the dizziness persisted and was found to have cerebellar hemorrhage and 4th ventricle IVH (Vermian Hemorrhage) for which he was transferred to [facility]. Impression: Nontraumatic cerebellar ICH. Paramedian/vermian cerebellar intracerebral hemorrhage with associated IVH - likely etiology being microangiopathy related to chronic hypertension leading to hypertensive intracerebral hemorrhage.    NEURO: Neurologically improved vs admission but remains with  some gait instability, stable cerebral edema with mass effect and brain compression leading to effacement of the fourth ventricle with stable hydrocephalus  s/p cerebral angiography 12/20: demonstrated normal pial and dural surfaces. No aneurysms. No venous occlusions BP goal - gradual normotension. Atorvastatin 20 mg at bedtime considering likely newly diagnosed diabetes and 10 years ASCVD risk being 10.4%, Recommend to obtain repeat neuroimaging in 4-6 weeks to r/o underlying pathology. PMR for AR.     ANTITHROMBOTIC THERAPY: None in the setting of ICH and no specific indications at this time    PULMONARY: Protecting airway, saturating well on room air.     CARDIOVASCULAR: TTE - unremarkable and no evidence of LVH, Continue cardiac monitoring, slowly titrate antihypertensive regimen accordingly       SBP goal: Gradual normotension     GASTROINTESTINAL: dysphagia screen passed. Tolerating diet      Diet: Regular. Consistent Carb.    RENAL: maintain adequate hydration, good urine output, avoid hyponatremia; c/o night incontinence, explained to patient and sister previously that should improve in time as his body recovers from the bleeding in his brain     Na Goal: Greater than 140     Bray: n        HEMATOLOGY: no active bleeding     DVT ppx: LMWH      ID: afebrile, leukocytosis on 12/30, pan-Cx ordered-negative, continue encourage incentive spirometry as tolerated. Lower extremity duplex negative for DVT. No si/sx of infection    Other: Appreciate derm consult. A1C 6.7- education, diet and lifestyle modifications- anticipate d/c on Metformin 500bid as FS range 120s-200s on  insulin.     DISPOSITION: Social work following with anticipated plan per family for return to [facility] for therapy  as wheelchair in place.  Flight booked for 1/8/18 per family plan is to discharge him Sunday evening with a benadryl order to give him for the long flight. Stable for discharge.      CORE MEASURES:        Admission NIHSS: No     TPA: NO      LDL/HDL: NO     Depression Screen: 0     Statin Therapy: NO     Dysphagia Screen: PASS     Smoking  NO      Afib  NO     Stroke Education YES ASSESSMENT:   58 Y/O man with vascular risk factors of age and hypertension reports to have noticed one day of dizziness prior to admission. Patient presented to ED when the dizziness persisted and was found to have cerebellar hemorrhage and 4th ventricle IVH (Vermian Hemorrhage) for which he was transferred to [facility]. Impression: Nontraumatic cerebellar ICH. Paramedian/vermian cerebellar intracerebral hemorrhage with associated IVH - likely etiology being microangiopathy related to chronic hypertension leading to hypertensive intracerebral hemorrhage.    NEURO: Neurologically improved vs admission but remains with  some gait instability, stable cerebral edema with mass effect and brain compression leading to effacement of the fourth ventricle with stable hydrocephalus  s/p cerebral angiography 12/20: demonstrated normal pial and dural surfaces. No aneurysms. No venous occlusions BP goal - gradual normotension. Atorvastatin 20 mg at bedtime considering likely newly diagnosed diabetes and 10 years ASCVD risk being 10.4%, Recommend to obtain repeat neuroimaging in 4-6 weeks to r/o underlying pathology. PMR for AR.     ANTITHROMBOTIC THERAPY: None in the setting of ICH and no specific indications at this time    PULMONARY: Protecting airway, saturating well on room air.     CARDIOVASCULAR: TTE - unremarkable and no evidence of LVH, Continue cardiac monitoring, slowly titrate antihypertensive regimen accordingly       SBP goal: Gradual normotension     GASTROINTESTINAL: dysphagia screen passed. Tolerating diet      Diet: Regular. Consistent Carb.    RENAL: maintain adequate hydration, good urine output, avoid hyponatremia; c/o night incontinence, explained to patient and sister previously that should improve in time as his body recovers from the bleeding in his brain     Na Goal: Greater than 140     Bray: n        HEMATOLOGY: no active bleeding     DVT ppx: LMWH      ID: afebrile, leukocytosis on 12/30, pan-Cx ordered-negative, continue encourage incentive spirometry as tolerated. Lower extremity duplex negative for DVT. No si/sx of infection    Other: Appreciate derm consult. A1C 6.7- education, diet and lifestyle modifications- anticipate d/c on Metformin 500bid as FS range 120s-200s on  insulin.  Follow up outpatient for soft tissue head mass.    DISPOSITION: Social work following with anticipated plan per family for return to [facility] for therapy  as wheelchair in place.  Flight booked for 1/8/18 per family plan is to discharge him Sunday evening with a benadryl order to give him for the long flight. Stable for discharge.      CORE MEASURES:        Admission NIHSS: No     TPA: NO      LDL/HDL: NO     Depression Screen: 0     Statin Therapy: NO     Dysphagia Screen: PASS     Smoking  NO      Afib  NO     Stroke Education YES

## 2018-01-07 VITALS
SYSTOLIC BLOOD PRESSURE: 147 MMHG | DIASTOLIC BLOOD PRESSURE: 95 MMHG | TEMPERATURE: 98 F | RESPIRATION RATE: 18 BRPM | OXYGEN SATURATION: 99 % | HEART RATE: 78 BPM

## 2018-01-07 LAB
GLUCOSE BLDC GLUCOMTR-MCNC: 143 MG/DL — HIGH (ref 70–99)
GLUCOSE BLDC GLUCOMTR-MCNC: 187 MG/DL — HIGH (ref 70–99)
GLUCOSE BLDC GLUCOMTR-MCNC: 216 MG/DL — HIGH (ref 70–99)

## 2018-01-07 PROCEDURE — 93306 TTE W/DOPPLER COMPLETE: CPT

## 2018-01-07 PROCEDURE — 86850 RBC ANTIBODY SCREEN: CPT

## 2018-01-07 PROCEDURE — 97530 THERAPEUTIC ACTIVITIES: CPT

## 2018-01-07 PROCEDURE — 82553 CREATINE MB FRACTION: CPT

## 2018-01-07 PROCEDURE — 80061 LIPID PANEL: CPT

## 2018-01-07 PROCEDURE — 97165 OT EVAL LOW COMPLEX 30 MIN: CPT

## 2018-01-07 PROCEDURE — 71045 X-RAY EXAM CHEST 1 VIEW: CPT

## 2018-01-07 PROCEDURE — 85027 COMPLETE CBC AUTOMATED: CPT

## 2018-01-07 PROCEDURE — 82550 ASSAY OF CK (CPK): CPT

## 2018-01-07 PROCEDURE — 83036 HEMOGLOBIN GLYCOSYLATED A1C: CPT

## 2018-01-07 PROCEDURE — 86901 BLOOD TYPING SEROLOGIC RH(D): CPT

## 2018-01-07 PROCEDURE — 97161 PT EVAL LOW COMPLEX 20 MIN: CPT

## 2018-01-07 PROCEDURE — 84443 ASSAY THYROID STIM HORMONE: CPT

## 2018-01-07 PROCEDURE — 84100 ASSAY OF PHOSPHORUS: CPT

## 2018-01-07 PROCEDURE — 97110 THERAPEUTIC EXERCISES: CPT

## 2018-01-07 PROCEDURE — 84484 ASSAY OF TROPONIN QUANT: CPT

## 2018-01-07 PROCEDURE — 36226 PLACE CATH VERTEBRAL ART: CPT

## 2018-01-07 PROCEDURE — 82962 GLUCOSE BLOOD TEST: CPT

## 2018-01-07 PROCEDURE — 36223 PLACE CATH CAROTID/INOM ART: CPT

## 2018-01-07 PROCEDURE — 80048 BASIC METABOLIC PNL TOTAL CA: CPT

## 2018-01-07 PROCEDURE — 70498 CT ANGIOGRAPHY NECK: CPT

## 2018-01-07 PROCEDURE — C1760: CPT

## 2018-01-07 PROCEDURE — C1769: CPT

## 2018-01-07 PROCEDURE — 85610 PROTHROMBIN TIME: CPT

## 2018-01-07 PROCEDURE — 81003 URINALYSIS AUTO W/O SCOPE: CPT

## 2018-01-07 PROCEDURE — 84436 ASSAY OF TOTAL THYROXINE: CPT

## 2018-01-07 PROCEDURE — 93005 ELECTROCARDIOGRAM TRACING: CPT

## 2018-01-07 PROCEDURE — 99285 EMERGENCY DEPT VISIT HI MDM: CPT | Mod: 25

## 2018-01-07 PROCEDURE — 97116 GAIT TRAINING THERAPY: CPT

## 2018-01-07 PROCEDURE — 70551 MRI BRAIN STEM W/O DYE: CPT

## 2018-01-07 PROCEDURE — 87040 BLOOD CULTURE FOR BACTERIA: CPT

## 2018-01-07 PROCEDURE — 87086 URINE CULTURE/COLONY COUNT: CPT

## 2018-01-07 PROCEDURE — 93970 EXTREMITY STUDY: CPT

## 2018-01-07 PROCEDURE — 80053 COMPREHEN METABOLIC PANEL: CPT

## 2018-01-07 PROCEDURE — 70450 CT HEAD/BRAIN W/O DYE: CPT

## 2018-01-07 PROCEDURE — 36227 PLACE CATH XTRNL CAROTID: CPT

## 2018-01-07 PROCEDURE — 97112 NEUROMUSCULAR REEDUCATION: CPT

## 2018-01-07 PROCEDURE — C1887: CPT

## 2018-01-07 PROCEDURE — C1894: CPT

## 2018-01-07 PROCEDURE — 85730 THROMBOPLASTIN TIME PARTIAL: CPT

## 2018-01-07 PROCEDURE — 86900 BLOOD TYPING SEROLOGIC ABO: CPT

## 2018-01-07 PROCEDURE — 80076 HEPATIC FUNCTION PANEL: CPT

## 2018-01-07 PROCEDURE — 83735 ASSAY OF MAGNESIUM: CPT

## 2018-01-07 PROCEDURE — 97535 SELF CARE MNGMENT TRAINING: CPT

## 2018-01-07 PROCEDURE — 70496 CT ANGIOGRAPHY HEAD: CPT

## 2018-01-07 PROCEDURE — 81001 URINALYSIS AUTO W/SCOPE: CPT

## 2018-01-07 RX ORDER — METOCLOPRAMIDE HCL 10 MG
10 TABLET ORAL ONCE
Qty: 0 | Refills: 0 | Status: COMPLETED | OUTPATIENT
Start: 2018-01-07 | End: 2018-01-07

## 2018-01-07 RX ADMIN — ENOXAPARIN SODIUM 40 MILLIGRAM(S): 100 INJECTION SUBCUTANEOUS at 17:25

## 2018-01-07 RX ADMIN — Medication 1 APPLICATION(S): at 17:25

## 2018-01-07 RX ADMIN — Medication 10 MILLIGRAM(S): at 06:06

## 2018-01-07 RX ADMIN — Medication 2: at 13:28

## 2018-01-07 RX ADMIN — Medication 650 MILLIGRAM(S): at 00:04

## 2018-01-07 RX ADMIN — LISINOPRIL 40 MILLIGRAM(S): 2.5 TABLET ORAL at 13:28

## 2018-01-07 RX ADMIN — Medication 25 MILLIGRAM(S): at 06:07

## 2018-01-07 RX ADMIN — ONDANSETRON 4 MILLIGRAM(S): 8 TABLET, FILM COATED ORAL at 03:39

## 2018-01-07 RX ADMIN — Medication 30 MILLILITER(S): at 00:05

## 2018-01-07 RX ADMIN — HUMAN INSULIN 4 UNIT(S): 100 INJECTION, SUSPENSION SUBCUTANEOUS at 17:50

## 2018-01-07 RX ADMIN — Medication 100 MILLIGRAM(S): at 13:27

## 2018-01-07 RX ADMIN — FAMOTIDINE 20 MILLIGRAM(S): 10 INJECTION INTRAVENOUS at 13:27

## 2018-01-07 RX ADMIN — Medication 650 MILLIGRAM(S): at 09:00

## 2018-01-07 RX ADMIN — Medication 1 APPLICATION(S): at 06:07

## 2018-01-07 RX ADMIN — Medication 4: at 08:10

## 2018-01-07 RX ADMIN — HUMAN INSULIN 4 UNIT(S): 100 INJECTION, SUSPENSION SUBCUTANEOUS at 13:29

## 2018-01-07 RX ADMIN — Medication 650 MILLIGRAM(S): at 08:09

## 2018-01-07 NOTE — PROGRESS NOTE ADULT - PROVIDER SPECIALTY LIST ADULT
NSICU
Neurology
Neurosurgery
Neurology

## 2018-01-07 NOTE — PROVIDER CONTACT NOTE (OTHER) - BACKGROUND
Pt admitted for hemorrhage. Pt vomited once during day shift 11/6. CT scan done. Results currently not present.

## 2018-01-07 NOTE — PROGRESS NOTE ADULT - ASSESSMENT
ASSESSMENT:   58 Y/O man with vascular risk factors of age and hypertension reports to have noticed one day of dizziness prior to admission. Patient presented to ED when the dizziness persisted and was found to have cerebellar hemorrhage and 4th ventricle IVH (Vermian Hemorrhage) for which he was transferred to [facility]. Impression: Nontraumatic cerebellar ICH. Paramedian/vermian cerebellar intracerebral hemorrhage with associated IVH - likely etiology being microangiopathy related to chronic hypertension leading to hypertensive intracerebral hemorrhage.    NEURO: Neurologically improved vs admission but remains with  some gait instability, noted vomiting x 2 yesterday and overnight repeat CTH , stable cerebral edema with mass effect and brain compression leading to effacement of the fourth ventricle with stable hydrocephalus  s/p cerebral angiography 12/20: demonstrated normal pial and dural surfaces. No aneurysms. No venous occlusions BP goal - gradual normotension. Atorvastatin 20 mg at bedtime considering likely newly diagnosed diabetes and 10 years ASCVD risk being 10.4%, Recommend to obtain repeat neuroimaging in 4-6 weeks to r/o underlying pathology. PMR for AR.     ANTITHROMBOTIC THERAPY: None in the setting of ICH and no specific indications at this time    PULMONARY: Protecting airway, saturating well on room air.     CARDIOVASCULAR: TTE - unremarkable and no evidence of LVH, Continue cardiac monitoring, slowly titrate antihypertensive regimen accordingly       SBP goal: Gradual normotension     GASTROINTESTINAL: dysphagia screen passed. Tolerating diet      Diet: Regular. Consistent Carb.    RENAL: maintain adequate hydration, good urine output, avoid hyponatremia; c/o night incontinence, explained to patient and sister previously that should improve in time as his body recovers from the bleeding in his brain     Na Goal: Greater than 140     Bray: n        HEMATOLOGY: no active bleeding     DVT ppx: LMWH      ID: afebrile, leukocytosis on 12/30, pan-Cx ordered-negative, continue encourage incentive spirometry as tolerated. Lower extremity duplex negative for DVT. No si/sx of infection    Other: Appreciate derm consult. A1C 6.7- education, diet and lifestyle modifications- anticipate d/c on Metformin 500bid as FS range 120s-200s on  insulin.  Follow up outpatient for soft tissue head mass.    DISPOSITION: Social work following with anticipated plan per family for return to [facility] for therapy  as wheelchair in place.  Flight booked for 1/8/18 per family       CORE MEASURES:        Admission NIHSS: No     TPA: NO      LDL/HDL: NO     Depression Screen: 0     Statin Therapy: NO     Dysphagia Screen: PASS     Smoking  NO      Afib  NO     Stroke Education YES ASSESSMENT:   58 Y/O man with vascular risk factors of age and hypertension reports to have noticed one day of dizziness prior to admission. Patient presented to ED when the dizziness persisted and was found to have cerebellar hemorrhage and 4th ventricle IVH (Vermian Hemorrhage) for which he was transferred to [facility]. Impression: Nontraumatic cerebellar ICH. Paramedian/vermian cerebellar intracerebral hemorrhage with associated IVH - likely etiology being microangiopathy related to chronic hypertension leading to hypertensive intracerebral hemorrhage.     NEURO: Neurologically improved vs admission but remains with  some gait instability, noted vomiting x 2 overnight and  repeat CTH without acute change , stable cerebral edema with mass effect and brain compression leading to effacement of the fourth ventricle with stable hydrocephalus  s/p cerebral angiography 12/20: demonstrated normal pial and dural surfaces. No aneurysms. No venous occlusions BP goal - gradual normotension. Atorvastatin 20 mg at bedtime considering likely newly diagnosed diabetes and 10 years ASCVD risk being 10.4%, Recommend to obtain repeat neuroimaging in 4-6 weeks to r/o underlying pathology. PMR for AR.     ANTITHROMBOTIC THERAPY: None in the setting of ICH and no specific indications at this time    PULMONARY: Protecting airway, saturating well on room air.     CARDIOVASCULAR: TTE - unremarkable and no evidence of LVH, Continue cardiac monitoring, slowly titrate antihypertensive regimen accordingly       SBP goal: Gradual normotension     GASTROINTESTINAL: dysphagia screen passed. Tolerating diet, denies nausea or vomiting at this time.      Diet: Regular. Consistent Carb.    RENAL: maintain adequate hydration, good urine output, avoid hyponatremia; c/o night incontinence, explained to patient and sister previously that should improve in time as his body recovers from the bleeding in his brain     Na Goal: Greater than 140     Bray: n        HEMATOLOGY: no active bleeding     DVT ppx: LMWH      ID: afebrile, leukocytosis on 12/30, pan-Cx ordered-negative, continue encourage incentive spirometry as tolerated. Lower extremity duplex negative for DVT. No si/sx of infection    Other: Appreciate derm consult. A1C 6.7- education, diet and lifestyle modifications-  d/c on Metformin 500bid as FS range 120s-200s on  insulin.  Follow up outpatient for soft tissue head mass. Translation services utilized # [id number].  All questions answered and concerns addressed, it was enforced he see a neurologist upon arrival to [facility]. Sister and patient verbalize/express a full understanding.     DISPOSITION: Social work following with anticipated plan per family for return to [facility] for therapy, wheelchair in place.  Flight booked for 1/8/18 per family, they plan to take him tonight.  RX picked up from [employer] (confirmed with sister and pharmacy).  Per sister she arranged assistance with airline.       CORE MEASURES:        Admission NIHSS: No     TPA: NO      LDL/HDL: NO     Depression Screen: 0     Statin Therapy: NO     Dysphagia Screen: PASS     Smoking  NO      Afib  NO     Stroke Education YES

## 2018-01-07 NOTE — PROGRESS NOTE ADULT - SUBJECTIVE AND OBJECTIVE BOX
THE PATIENT WAS SEEN AND EXAMINED BY ME WITH THE HOUSESTAFF AND STROKE TEAM DURING MORNING ROUNDS.   HPI:  57 year old male pmhx of htn coming in with 1 day of dizziness prior to admission. Patient presented to ED when the dizziness persisted and was found to have cerebellar hemorrhage and 4th ventricle IVH (Vermian Hemorrhage) for which he was transferred to [facility].  Patient stated he takes no medication and has no pmhx but reportedly has hx of htn.Denied generalized weakness, and nausea. No fall or recent trauma. No numbness, tingling, weakness. No antiplatelets or anticoagulants. Patient  denied and headache, n/v, blurry vision, double vision, numbness or weakness    SUBJECTIVE: Noted vomiting x 2.     acetaminophen   Tablet 650 milliGRAM(s) Oral every 6 hours PRN  acetaminophen   Tablet. 650 milliGRAM(s) Oral every 6 hours PRN  aluminum hydroxide/magnesium hydroxide/simethicone Suspension 30 milliLiter(s) Oral every 6 hours PRN  amLODIPine   Tablet 10 milliGRAM(s) Oral daily  atorvastatin 20 milliGRAM(s) Oral at bedtime  dextrose 50% Injectable 12.5 Gram(s) IV Push once  dextrose 50% Injectable 25 Gram(s) IV Push once  dextrose 50% Injectable 25 Gram(s) IV Push once  dextrose Gel 1 Dose(s) Oral once PRN  docusate sodium 100 milliGRAM(s) Oral daily  enoxaparin Injectable 40 milliGRAM(s) SubCutaneous <User Schedule>  famotidine    Tablet 20 milliGRAM(s) Oral daily  glucagon  Injectable 1 milliGRAM(s) IntraMuscular once PRN  influenza   Vaccine 0.5 milliLiter(s) IntraMuscular once  insulin lispro (HumaLOG) corrective regimen sliding scale   SubCutaneous Before meals and at bedtime  insulin NPH human recombinant 4 Unit(s) SubCutaneous Before meals and at bedtime  lisinopril 40 milliGRAM(s) Oral daily  metoprolol succinate ER 25 milliGRAM(s) Oral daily  ondansetron Injectable 4 milliGRAM(s) IV Push every 6 hours PRN  senna 2 Tablet(s) Oral at bedtime  triamcinolone 0.1% Ointment 1 Application(s) Topical two times a day      PHYSICAL EXAM:   Vital Signs Last 24 Hrs  T(C): 36.8 (07 Jan 2018 04:44), Max: 36.8 (06 Jan 2018 23:47)  T(F): 98.2 (07 Jan 2018 04:44), Max: 98.2 (06 Jan 2018 23:47)  HR: 74 (07 Jan 2018 04:44) (65 - 74)  BP: 137/89 (07 Jan 2018 04:44) (133/85 - 152/81)  BP(mean): --  RR: 18 (07 Jan 2018 04:44) (18 - 20)  SpO2: 100% (07 Jan 2018 04:44) (98% - 100%)    General: Resting in bed. No acute distress  HEENT: PERRL EOM intact, visual fields full    NEUROLOGICAL EXAM:   Mental status: Cantonese speaking, alert, oriented x3. Able to follow simple commands. Speech is fluent but with mild dysarthria   Cranial Nerves: Left facial droop, no nystagmus, no dysarthria, tongue is midline.  Motor exam: Normal tone, no drift. Strength 5/5 throughout.  Sensation: Intact to light touch   Coordination/ Gait: Bilateral ataxia/dysmetria, worse on the left, Ataxic gait with mild truncal ataxia   LABS:         Hemoglobin A1C, Whole Blood: 6.7 % (12-17 @ 06:40)      IMAGING: Reviewed by me.     CT BRAIN (12/19/2017)  History: Follow-up parenchymal hemorrhage. Multiple axial sections were performed from base of skull to vertex without contrast enhancement. This exam is compared with prior noncontrast head CT performed on December 18, 2017. Parenchymal hemorrhage and surrounding edema is again seen in the   posterior fossa midline. This area. Or hemorrhage measures approximately .6 x 3.0 cm and previously measured approximately 3.4 x 3.1 cm. Mass effect on the fourth ventricles again seen and unchanged. The size and configuration of the prominent lateral third ventricles are again seen and unchanged.  Periventricular white matter lucency is again seen and unchanged. Evaluation of the osseous structures with the appropriate window appears unremarkable. The visualized paranasal sinuses mastoid and middle ear regions appear clear    CT Angio Neck w/ IV Cont (12.15.17) CT BRAIN: Acute intra-axial hemorrhage in the cerebellar vermis causing mass effect on the fourth ventricle. Mild prominence of the third and lateral ventricles. Heterogeneous calcified mass in the extracalvarial soft tissues of the right occipital bone.  CTA BRAIN: Patent intracranial circulation. No flow-limiting stenosis or occlusion.   CTA NECK: Patent cervical vasculature. No flow limiting stenosis or occlusion.   Cerebral Angiogram (12.20.17) : Preliminary report normal pial and dural surfaces. No aneurysms. No venous occlusions.     MR Head No Cont (12.22.17)   As described on patient's CT midline cerebellar hemorrhage   with mass effect on the fourth ventricle and some mild rostral   hydrocephalus suggested with mild dilatation of the temporal horns and   the lateral ventricles. There is fairly prominent microvascular ischemic   type changes identified given the patient's age. Punctate focus of signal   hyperintensity on diffusion in the left parietal occipital region   consistent with an acute punctate focus of infarct.   CT Head No Cont (12.28.17)  Resolving vermian hemorrhage compared with 12/19/2017. No change in mass   effect on the fourth ventricle or rostral hydrocephalus. THE PATIENT WAS SEEN AND EXAMINED BY ME WITH THE HOUSESTAFF AND STROKE TEAM DURING MORNING ROUNDS.   HPI:  57 year old male pmhx of htn coming in with 1 day of dizziness prior to admission. Patient presented to ED when the dizziness persisted and was found to have cerebellar hemorrhage and 4th ventricle IVH (Vermian Hemorrhage) for which he was transferred to [facility].  Patient stated he takes no medication and has no pmhx but reportedly has hx of htn.Denied generalized weakness, and nausea. No fall or recent trauma. No numbness, tingling, weakness. No antiplatelets or anticoagulants. Patient  denied and headache, n/v, blurry vision, double vision, numbness or weakness    SUBJECTIVE: Noted vomiting x 2 overnight.     acetaminophen   Tablet 650 milliGRAM(s) Oral every 6 hours PRN  acetaminophen   Tablet. 650 milliGRAM(s) Oral every 6 hours PRN  aluminum hydroxide/magnesium hydroxide/simethicone Suspension 30 milliLiter(s) Oral every 6 hours PRN  amLODIPine   Tablet 10 milliGRAM(s) Oral daily  atorvastatin 20 milliGRAM(s) Oral at bedtime  dextrose 50% Injectable 12.5 Gram(s) IV Push once  dextrose 50% Injectable 25 Gram(s) IV Push once  dextrose 50% Injectable 25 Gram(s) IV Push once  dextrose Gel 1 Dose(s) Oral once PRN  docusate sodium 100 milliGRAM(s) Oral daily  enoxaparin Injectable 40 milliGRAM(s) SubCutaneous <User Schedule>  famotidine    Tablet 20 milliGRAM(s) Oral daily  glucagon  Injectable 1 milliGRAM(s) IntraMuscular once PRN  influenza   Vaccine 0.5 milliLiter(s) IntraMuscular once  insulin lispro (HumaLOG) corrective regimen sliding scale   SubCutaneous Before meals and at bedtime  insulin NPH human recombinant 4 Unit(s) SubCutaneous Before meals and at bedtime  lisinopril 40 milliGRAM(s) Oral daily  metoprolol succinate ER 25 milliGRAM(s) Oral daily  ondansetron Injectable 4 milliGRAM(s) IV Push every 6 hours PRN  senna 2 Tablet(s) Oral at bedtime  triamcinolone 0.1% Ointment 1 Application(s) Topical two times a day      PHYSICAL EXAM:   Vital Signs Last 24 Hrs  T(C): 36.8 (07 Jan 2018 04:44), Max: 36.8 (06 Jan 2018 23:47)  T(F): 98.2 (07 Jan 2018 04:44), Max: 98.2 (06 Jan 2018 23:47)  HR: 74 (07 Jan 2018 04:44) (65 - 74)  BP: 137/89 (07 Jan 2018 04:44) (133/85 - 152/81)  BP(mean): --  RR: 18 (07 Jan 2018 04:44) (18 - 20)  SpO2: 100% (07 Jan 2018 04:44) (98% - 100%)    General: Resting in bed. No acute distress  HEENT: PERRL EOM intact, visual fields full    NEUROLOGICAL EXAM:   Mental status: Cantonese speaking, alert, oriented x3. Able to follow simple commands. Speech is fluent but with mild dysarthria   Cranial Nerves: Left facial droop, no nystagmus, no dysarthria, tongue is midline.  Motor exam: Normal tone, no drift. Strength 5/5 throughout.  Sensation: Intact to light touch   Coordination/ Gait: Bilateral ataxia/dysmetria, worse on the left, Ataxic gait with mild truncal ataxia   LABS:         Hemoglobin A1C, Whole Blood: 6.7 % (12-17 @ 06:40)      IMAGING: Reviewed by me.     CT BRAIN (12/19/2017)  History: Follow-up parenchymal hemorrhage. Multiple axial sections were performed from base of skull to vertex without contrast enhancement. This exam is compared with prior noncontrast head CT performed on December 18, 2017. Parenchymal hemorrhage and surrounding edema is again seen in the   posterior fossa midline. This area. Or hemorrhage measures approximately .6 x 3.0 cm and previously measured approximately 3.4 x 3.1 cm. Mass effect on the fourth ventricles again seen and unchanged. The size and configuration of the prominent lateral third ventricles are again seen and unchanged.  Periventricular white matter lucency is again seen and unchanged. Evaluation of the osseous structures with the appropriate window appears unremarkable. The visualized paranasal sinuses mastoid and middle ear regions appear clear    CT Angio Neck w/ IV Cont (12.15.17) CT BRAIN: Acute intra-axial hemorrhage in the cerebellar vermis causing mass effect on the fourth ventricle. Mild prominence of the third and lateral ventricles. Heterogeneous calcified mass in the extracalvarial soft tissues of the right occipital bone.  CTA BRAIN: Patent intracranial circulation. No flow-limiting stenosis or occlusion.   CTA NECK: Patent cervical vasculature. No flow limiting stenosis or occlusion.   Cerebral Angiogram (12.20.17) : Preliminary report normal pial and dural surfaces. No aneurysms. No venous occlusions.     MR Head No Cont (12.22.17)   As described on patient's CT midline cerebellar hemorrhage   with mass effect on the fourth ventricle and some mild rostral   hydrocephalus suggested with mild dilatation of the temporal horns and   the lateral ventricles. There is fairly prominent microvascular ischemic   type changes identified given the patient's age. Punctate focus of signal   hyperintensity on diffusion in the left parietal occipital region   consistent with an acute punctate focus of infarct.   CT Head No Cont (12.28.17)  Resolving vermian hemorrhage compared with 12/19/2017. No change in mass   effect on the fourth ventricle or rostral hydrocephalus.

## 2018-12-13 NOTE — OCCUPATIONAL THERAPY INITIAL EVALUATION ADULT - HOME MANAGEMENT SKILLS, PREVIOUS LEVEL OF FUNCTION, OT EVAL
independent Implemented All Fall with Harm Risk Interventions:  [place] to call system. Call bell, personal items and telephone within reach. Instruct patient to call for assistance. Room bathroom lighting operational. Non-slip footwear when patient is off stretcher. Physically safe environment: no spills, clutter or unnecessary equipment. Stretcher in lowest position, wheels locked, appropriate side rails in place. Provide visual cue, wrist band, yellow gown, etc. Monitor gait and stability. Monitor for mental status changes and reorient to person, place, and time. Review medications for side effects contributing to fall risk. Reinforce activity limits and safety measures with patient and family. Provide visual clues: red socks.

## 2022-03-16 NOTE — PATIENT PROFILE ADULT. - PATIENT/CAREGIVER ACCEPTED INTERPRETER SERVICES
[FreeTextEntry3] : \par I personally scribed for ANTOINE JONES on Mar 11 2022  3:00PM . \par \par \par \par \par Physician Attestation:\par Documented by TRACI BREWER acting as a scribe for ANTOINE JONES 03/11/2022 . \par                 All medical record entries made by the Scribe were at my, ANTOINE JONES , direction and personally dictated by me on 03/11/2022 . I have reviewed the chart and agree that the record accurately reflects my personal performance of the history, physical exam, assessment and plan\par \par 
no

## 2023-05-03 NOTE — DISCHARGE NOTE ADULT - INSTRUCTIONS
[No Acute Distress] : no acute distress [Well Nourished] : well nourished [Well Developed] : well developed [Well-Appearing] : well-appearing [Normal Voice/Communication] : normal voice/communication [Normal Sclera/Conjunctiva] : normal sclera/conjunctiva [PERRL] : pupils equal round and reactive to light [Normal Outer Ear/Nose] : the outer ears and nose were normal in appearance [Normal Oropharynx] : the oropharynx was normal [Normal TMs] : both tympanic membranes were normal [No JVD] : no jugular venous distention [Supple] : supple [No Lymphadenopathy] : no lymphadenopathy [Thyroid Normal, No Nodules] : the thyroid was normal and there were no nodules present [No Respiratory Distress] : no respiratory distress  [Clear to Auscultation] : lungs were clear to auscultation bilaterally [No Accessory Muscle Use] : no accessory muscle use [Normal Rate] : normal rate  [Regular Rhythm] : with a regular rhythm [Normal S1, S2] : normal S1 and S2 [No Murmur] : no murmur heard [No Carotid Bruits] : no carotid bruits [No Abdominal Bruit] : a ~M bruit was not heard ~T in the abdomen [No Varicosities] : no varicosities [Pedal Pulses Present] : the pedal pulses are present [No Edema] : there was no peripheral edema [No Extremity Clubbing/Cyanosis] : no extremity clubbing/cyanosis [Soft] : abdomen soft [Non Tender] : non-tender [Non-distended] : non-distended [No Masses] : no abdominal mass palpated [No HSM] : no HSM [Normal Bowel Sounds] : normal bowel sounds [Normal Supraclavicular Nodes] : no supraclavicular lymphadenopathy [Normal Posterior Cervical Nodes] : no posterior cervical lymphadenopathy [Normal Anterior Cervical Nodes] : no anterior cervical lymphadenopathy [No CVA Tenderness] : no CVA  tenderness [No Spinal Tenderness] : no spinal tenderness [No Joint Swelling] : no joint swelling [Grossly Normal Strength/Tone] : grossly normal strength/tone [No Rash] : no rash [Normal Gait] : normal gait [Coordination Grossly Intact] : coordination grossly intact [No Focal Deficits] : no focal deficits [Speech Grossly Normal] : speech grossly normal [Normal Affect] : the affect was normal [Alert and Oriented x3] : oriented to person, place, and time [Normal Mood] : the mood was normal [Normal Insight/Judgement] : insight and judgment were intact [Declined Rectal Exam] : declined rectal exam [de-identified] : R hand middle finger trigger finger  regular texture